# Patient Record
Sex: FEMALE | Race: WHITE | Employment: OTHER | URBAN - NONMETROPOLITAN AREA
[De-identification: names, ages, dates, MRNs, and addresses within clinical notes are randomized per-mention and may not be internally consistent; named-entity substitution may affect disease eponyms.]

---

## 2017-07-20 ENCOUNTER — HOSPITAL ENCOUNTER (OUTPATIENT)
Age: 82
Setting detail: SPECIMEN
Discharge: HOME | End: 2017-07-20
Payer: MEDICAID

## 2017-07-24 ENCOUNTER — HOSPITAL ENCOUNTER (OUTPATIENT)
Age: 82
Setting detail: SPECIMEN
Discharge: HOME | End: 2017-07-24
Payer: MEDICAID

## 2017-08-11 ENCOUNTER — HOSPITAL ENCOUNTER (OUTPATIENT)
Age: 82
Setting detail: SPECIMEN
Discharge: HOME | End: 2017-08-11
Payer: MEDICAID

## 2017-08-14 ENCOUNTER — HOSPITAL ENCOUNTER (OUTPATIENT)
Age: 82
Setting detail: SPECIMEN
Discharge: HOME | End: 2017-08-14
Payer: MEDICAID

## 2017-08-16 ENCOUNTER — HOSPITAL ENCOUNTER (OUTPATIENT)
Age: 82
Setting detail: SPECIMEN
Discharge: HOME | End: 2017-08-16
Payer: MEDICARE

## 2017-08-16 LAB
ABSOLUTE EOS #: 0 K/UL (ref 0–0.4)
ABSOLUTE LYMPH #: 1.3 K/UL (ref 1–4.8)
ABSOLUTE MONO #: 0.5 K/UL (ref 0–1)
BASOPHILS # BLD: 0 %
BASOPHILS ABSOLUTE: 0 K/UL (ref 0–0.2)
DIFFERENTIAL TYPE: ABNORMAL
EOSINOPHILS RELATIVE PERCENT: 1 %
HCT VFR BLD CALC: 35.8 % (ref 36–46)
HEMOGLOBIN: 12.1 G/DL (ref 12–16)
LYMPHOCYTES # BLD: 21 %
MCH RBC QN AUTO: 30.4 PG (ref 26–34)
MCHC RBC AUTO-ENTMCNC: 33.7 G/DL (ref 31–37)
MCV RBC AUTO: 90.1 FL (ref 80–100)
MONOCYTES # BLD: 8 %
PDW BLD-RTO: 14.5 % (ref 12.1–15.2)
PLATELET # BLD: 227 K/UL (ref 140–450)
PLATELET ESTIMATE: ABNORMAL
PMV BLD AUTO: 10.4 FL (ref 6–12)
RBC # BLD: 3.97 M/UL (ref 4–5.2)
RBC # BLD: ABNORMAL 10*6/UL
SEG NEUTROPHILS: 70 %
SEGMENTED NEUTROPHILS ABSOLUTE COUNT: 4.1 K/UL (ref 1.8–7.7)
WBC # BLD: 5.9 K/UL (ref 3.5–11)
WBC # BLD: ABNORMAL 10*3/UL

## 2017-08-16 PROCEDURE — 85025 COMPLETE CBC W/AUTO DIFF WBC: CPT

## 2017-08-16 PROCEDURE — 36415 COLL VENOUS BLD VENIPUNCTURE: CPT

## 2017-08-16 PROCEDURE — P9604 ONE-WAY ALLOW PRORATED TRIP: HCPCS

## 2018-01-24 ENCOUNTER — HOSPITAL ENCOUNTER (OUTPATIENT)
Age: 83
Setting detail: SPECIMEN
Discharge: HOME | End: 2018-01-24
Payer: MEDICARE

## 2018-01-24 LAB
ABSOLUTE BANDS #: 0.23 K/UL (ref 0–1)
ABSOLUTE EOS #: 0.23 K/UL (ref 0–0.4)
ABSOLUTE IMMATURE GRANULOCYTE: ABNORMAL K/UL (ref 0–0.3)
ABSOLUTE LYMPH #: 0.98 K/UL (ref 1–4.8)
ABSOLUTE MONO #: 0.62 K/UL (ref 0–1)
ATYPICAL LYMPHOCYTE ABSOLUTE COUNT: 0.08 K/UL
ATYPICAL LYMPHOCYTES: 2 %
BANDS: 6 % (ref 0–10)
BASOPHILS # BLD: 1 % (ref 0–2)
BASOPHILS ABSOLUTE: 0.04 K/UL (ref 0–0.2)
DIFFERENTIAL TYPE: ABNORMAL
EOSINOPHILS RELATIVE PERCENT: 6 % (ref 0–8)
HCT VFR BLD CALC: 35.7 % (ref 36–46)
HEMOGLOBIN: 11.9 G/DL (ref 12–16)
IMMATURE GRANULOCYTES: ABNORMAL %
LYMPHOCYTES # BLD: 25 % (ref 24–44)
MCH RBC QN AUTO: 30.3 PG (ref 26–34)
MCHC RBC AUTO-ENTMCNC: 33.3 G/DL (ref 31–37)
MCV RBC AUTO: 91.2 FL (ref 80–100)
MONOCYTES # BLD: 16 % (ref 0–12)
MORPHOLOGY: NORMAL
NRBC AUTOMATED: ABNORMAL PER 100 WBC
PDW BLD-RTO: 14.3 % (ref 12.1–15.2)
PLATELET # BLD: 209 K/UL (ref 140–450)
PLATELET ESTIMATE: ABNORMAL
PMV BLD AUTO: 10.4 FL (ref 6–12)
RBC # BLD: 3.91 M/UL (ref 4–5.2)
RBC # BLD: ABNORMAL 10*6/UL
SEG NEUTROPHILS: 44 % (ref 36–66)
SEGMENTED NEUTROPHILS ABSOLUTE COUNT: 1.72 K/UL (ref 1.8–7.7)
WBC # BLD: 3.9 K/UL (ref 3.5–11)
WBC # BLD: ABNORMAL 10*3/UL

## 2018-01-24 PROCEDURE — 85025 COMPLETE CBC W/AUTO DIFF WBC: CPT

## 2018-01-24 PROCEDURE — 36415 COLL VENOUS BLD VENIPUNCTURE: CPT

## 2018-01-24 PROCEDURE — P9604 ONE-WAY ALLOW PRORATED TRIP: HCPCS

## 2018-02-28 ENCOUNTER — OUTSIDE SERVICES (OUTPATIENT)
Dept: FAMILY MEDICINE CLINIC | Age: 83
End: 2018-02-28
Payer: MEDICARE

## 2018-02-28 DIAGNOSIS — I10 ESSENTIAL HYPERTENSION: Primary | ICD-10-CM

## 2018-02-28 DIAGNOSIS — F03.90 DEMENTIA WITHOUT BEHAVIORAL DISTURBANCE, UNSPECIFIED DEMENTIA TYPE: ICD-10-CM

## 2018-02-28 DIAGNOSIS — F42.9 OBSESSIVE-COMPULSIVE DISORDER, UNSPECIFIED TYPE: ICD-10-CM

## 2018-02-28 DIAGNOSIS — D50.9 IRON DEFICIENCY ANEMIA, UNSPECIFIED IRON DEFICIENCY ANEMIA TYPE: ICD-10-CM

## 2018-02-28 DIAGNOSIS — N18.30 CHRONIC RENAL FAILURE, STAGE 3 (MODERATE) (HCC): ICD-10-CM

## 2018-02-28 PROCEDURE — 99308 SBSQ NF CARE LOW MDM 20: CPT | Performed by: FAMILY MEDICINE

## 2018-02-28 NOTE — PROGRESS NOTES
The following note was scribed by ANNABELLE Cox, [address] [facility]   [facility]  [address] [facility]  [place] [id number]  Dept: [phone]    HPI:  Karon Montes is a 80 y.o. female being seen for her 1 month follow up. Location of visit: [facility]    Last Visit:  She continues to express wanting to go home, I explain to her again that this is not my decision. I changed Omeprazole to qod for 2 weeks and then stop and replacing this with Pepcid. Long term use of this medication may affect the kidneys. Faxed Correspondence:  Nurse reported on 1/23 that she is requesting cough medicine d/t dry unproductive cough, no other symptoms. Delsym 1 teaspoon q12h prn was started. Nurse clarifying Delsym order is for Delsym ER 30 mg/5ml on 1/24. On 2/1 her temp was 99.8, moist productive cough, rhonchi noted bilat upper lungs, albuterol ud tid x 3 days, bid 3 days was initiated. 2/2: She is adamantly refused aerosol treatments, stating she does not need it. She is having purulent sputum, no fever. Start Levaquin 500 mg po qd 7 days and d/c aerosols. 2/15: She was complaining of R rib pain with discomfort on R side of back. Tylenol somewhat helpful. Chest x-ray reviewed. 2/19: Chest x-ray results received bony ossification of the R ribs is normal. No fracture or costovertebral dislocation, no pneumothorax is seen. 2/19: Returning from [place] and while walking back in the door, the door hit her on the R eyebrow and the side of the eye, there is a small abrasion to the R eyebrow and redness noted to the side of the face. New Today:  Nurse reports that she has been losing weight and picking at food, she does not believe she will drink a boost. She also has indigestion and belching with change of medication. Prior to Admission medications    Medication Sig Start Date End Date Taking?  Authorizing Provider   acetaminophen 650 MG TABS Take 650 mg by mouth every 4 hours as needed. 12/23/14   Caity Bloom MD   amLODIPine (NORVASC) 2.5 MG tablet Take 1 tablet by mouth daily. 12/23/14   Caity Bloom MD   ferrous sulfate 325 (65 FE) MG tablet Take 1 tablet by mouth 2 times daily (with meals). 12/23/14   Caity Bloom MD   bimatoprost (LUMIGAN) 0.01 % SOLN ophthalmic drops Place 1 drop into the right eye nightly. 12/23/14   Caity Bloom MD   NONFORMULARY Eye drops    Historical Provider, MD       The patients medications and allergies were reviewed in [facility]    The patients Medical, Family and Surgical history were reviewed in Epic as well    ROS:  Denies chest pain or palpitation  Denies coughing and SOB   Denies rash  Denies muscle or joint pains  Denies lightheadedness or dizziness  Denies abdominal pain, constipation, diarrhea, and urinary abnormalities  Denies falls    Past Surgical History:   Procedure Laterality Date    CATARACT REMOVAL       No family history on file. No past medical history on file. Social History   Substance Use Topics    Smoking status: Never Smoker    Smokeless tobacco: Not on file    Alcohol use Not on file      Current Outpatient Prescriptions   Medication Sig Dispense Refill    acetaminophen 650 MG TABS Take 650 mg by mouth every 4 hours as needed. 120 tablet 3    amLODIPine (NORVASC) 2.5 MG tablet Take 1 tablet by mouth daily. 30 tablet 3    ferrous sulfate 325 (65 FE) MG tablet Take 1 tablet by mouth 2 times daily (with meals). 30 tablet 3    bimatoprost (LUMIGAN) 0.01 % SOLN ophthalmic drops Place 1 drop into the right eye nightly.  NONFORMULARY Eye drops       No current facility-administered medications for this visit. No Known Allergies    PHYSICAL EXAM:    There were no vitals taken for this visit.   Wt Readings from Last 3 Encounters:   12/22/14 97 lb (44 kg)   11/24/14 100 lb (45.4 kg)     BP Readings from Last 3 Encounters:   12/23/14 151/71   11/24/14 198/84     General: alert, oriented, responsive, in no

## 2018-03-07 PROBLEM — R63.0 ANOREXIA: Status: ACTIVE | Noted: 2018-03-07

## 2018-03-07 PROBLEM — G31.9 DEGENERATIVE DISEASE OF NERVOUS SYSTEM (HCC): Status: ACTIVE | Noted: 2018-03-07

## 2018-03-07 PROBLEM — I51.7 CARDIOMEGALY: Status: ACTIVE | Noted: 2018-03-07

## 2018-03-07 PROBLEM — H50.10 EXOTROPIA: Status: ACTIVE | Noted: 2018-03-07

## 2018-03-07 PROBLEM — H54.8 LEGAL BLINDNESS, AS DEFINED IN USA: Status: ACTIVE | Noted: 2018-03-07

## 2018-03-07 PROBLEM — G93.40 ENCEPHALOPATHY: Status: ACTIVE | Noted: 2018-03-07

## 2018-03-07 PROBLEM — F29 PSYCHOSIS NOT DUE TO SUBSTANCE OR KNOWN PHYSIOLOGICAL CONDITION (HCC): Status: ACTIVE | Noted: 2018-03-07

## 2018-03-07 PROBLEM — N18.9 CHRONIC KIDNEY DISEASE (CKD): Status: ACTIVE | Noted: 2018-03-07

## 2018-03-07 PROBLEM — R41.9 UNSPECIFIED SYMPTOMS AND SIGNS INVOLVING COGNITIVE FUNCTIONS AND AWARENESS: Status: ACTIVE | Noted: 2018-03-07

## 2018-03-07 PROBLEM — F42.9 OBSESSIVE COMPULSIVE DISORDER: Status: ACTIVE | Noted: 2018-03-07

## 2018-03-07 PROBLEM — F22 DELUSIONAL DISORDER, MIXED TYPE (HCC): Status: ACTIVE | Noted: 2018-03-07

## 2018-03-07 PROBLEM — F03.90 UNSPECIFIED DEMENTIA WITHOUT BEHAVIORAL DISTURBANCE: Status: ACTIVE | Noted: 2018-03-07

## 2018-03-28 ENCOUNTER — OUTSIDE SERVICES (OUTPATIENT)
Dept: FAMILY MEDICINE CLINIC | Age: 83
End: 2018-03-28
Payer: MEDICARE

## 2018-03-28 DIAGNOSIS — N18.9 CHRONIC KIDNEY DISEASE, UNSPECIFIED CKD STAGE: ICD-10-CM

## 2018-03-28 DIAGNOSIS — F29 PSYCHOSIS NOT DUE TO SUBSTANCE OR KNOWN PHYSIOLOGICAL CONDITION (HCC): ICD-10-CM

## 2018-03-28 DIAGNOSIS — F42.9 OBSESSIVE-COMPULSIVE DISORDER, UNSPECIFIED TYPE: ICD-10-CM

## 2018-03-28 DIAGNOSIS — H54.8 LEGAL BLINDNESS, AS DEFINED IN USA: ICD-10-CM

## 2018-03-28 DIAGNOSIS — F22 DELUSIONAL DISORDER, MIXED TYPE (HCC): ICD-10-CM

## 2018-03-28 DIAGNOSIS — G31.9 DEGENERATIVE DISEASE OF NERVOUS SYSTEM (HCC): ICD-10-CM

## 2018-03-28 DIAGNOSIS — F03.90 DEMENTIA WITHOUT BEHAVIORAL DISTURBANCE, UNSPECIFIED DEMENTIA TYPE: ICD-10-CM

## 2018-03-28 DIAGNOSIS — R41.9 UNSPECIFIED SYMPTOMS AND SIGNS INVOLVING COGNITIVE FUNCTIONS AND AWARENESS: ICD-10-CM

## 2018-03-28 DIAGNOSIS — I10 ESSENTIAL HYPERTENSION: Primary | Chronic | ICD-10-CM

## 2018-03-28 PROCEDURE — G8484 FLU IMMUNIZE NO ADMIN: HCPCS | Performed by: FAMILY MEDICINE

## 2018-03-28 PROCEDURE — 1123F ACP DISCUSS/DSCN MKR DOCD: CPT | Performed by: FAMILY MEDICINE

## 2018-03-28 PROCEDURE — 99308 SBSQ NF CARE LOW MDM 20: CPT | Performed by: FAMILY MEDICINE

## 2018-04-22 PROBLEM — N18.30 STAGE 3 CHRONIC KIDNEY DISEASE (HCC): Status: ACTIVE | Noted: 2018-03-07

## 2018-06-21 PROBLEM — R63.0 ANOREXIA: Status: RESOLVED | Noted: 2018-03-07 | Resolved: 2018-06-21

## 2018-06-21 PROBLEM — F29 PSYCHOSIS NOT DUE TO SUBSTANCE OR KNOWN PHYSIOLOGICAL CONDITION (HCC): Status: RESOLVED | Noted: 2018-03-07 | Resolved: 2018-06-21

## 2018-06-21 PROBLEM — G31.9 DEGENERATIVE DISEASE OF NERVOUS SYSTEM (HCC): Status: RESOLVED | Noted: 2018-03-07 | Resolved: 2018-06-21

## 2018-06-21 PROBLEM — R41.9 UNSPECIFIED SYMPTOMS AND SIGNS INVOLVING COGNITIVE FUNCTIONS AND AWARENESS: Status: RESOLVED | Noted: 2018-03-07 | Resolved: 2018-06-21

## 2018-06-21 PROBLEM — G93.40 ENCEPHALOPATHY: Status: RESOLVED | Noted: 2018-03-07 | Resolved: 2018-06-21

## 2018-07-23 ENCOUNTER — HOSPITAL ENCOUNTER (OUTPATIENT)
Age: 83
Setting detail: SPECIMEN
Discharge: HOME | End: 2018-07-23
Payer: MEDICARE

## 2018-07-23 LAB
ABSOLUTE EOS #: <0.03 K/UL (ref 0–0.44)
ABSOLUTE IMMATURE GRANULOCYTE: 0.03 K/UL (ref 0–0.3)
ABSOLUTE LYMPH #: 1.2 K/UL (ref 1.1–3.7)
ABSOLUTE MONO #: 0.47 K/UL (ref 0.1–1.2)
BASOPHILS # BLD: 1 % (ref 0–2)
BASOPHILS ABSOLUTE: 0.05 K/UL (ref 0–0.2)
DIFFERENTIAL TYPE: ABNORMAL
EOSINOPHILS RELATIVE PERCENT: 0 % (ref 1–4)
HCT VFR BLD CALC: 37.2 % (ref 36.3–47.1)
HEMOGLOBIN: 11.9 G/DL (ref 11.9–15.1)
IMMATURE GRANULOCYTES: 0 %
LYMPHOCYTES # BLD: 18 % (ref 24–43)
MCH RBC QN AUTO: 30.2 PG (ref 25.2–33.5)
MCHC RBC AUTO-ENTMCNC: 32 G/DL (ref 28.4–34.8)
MCV RBC AUTO: 94.4 FL (ref 82.6–102.9)
MONOCYTES # BLD: 7 % (ref 3–12)
NRBC AUTOMATED: 0 PER 100 WBC
PDW BLD-RTO: 13 % (ref 11.8–14.4)
PLATELET # BLD: 302 K/UL (ref 138–453)
PLATELET ESTIMATE: ABNORMAL
PMV BLD AUTO: 11 FL (ref 8.1–13.5)
RBC # BLD: 3.94 M/UL (ref 3.95–5.11)
RBC # BLD: ABNORMAL 10*6/UL
SEG NEUTROPHILS: 74 % (ref 36–65)
SEGMENTED NEUTROPHILS ABSOLUTE COUNT: 5.12 K/UL (ref 1.5–8.1)
WBC # BLD: 6.9 K/UL (ref 3.5–11.3)
WBC # BLD: ABNORMAL 10*3/UL

## 2018-07-23 PROCEDURE — P9604 ONE-WAY ALLOW PRORATED TRIP: HCPCS

## 2018-07-23 PROCEDURE — 36415 COLL VENOUS BLD VENIPUNCTURE: CPT

## 2018-07-23 PROCEDURE — 85025 COMPLETE CBC W/AUTO DIFF WBC: CPT

## 2019-01-16 PROBLEM — F32.A DEPRESSION: Status: ACTIVE | Noted: 2019-01-16

## 2019-01-24 ENCOUNTER — HOSPITAL ENCOUNTER (OUTPATIENT)
Age: 84
Setting detail: SPECIMEN
Discharge: HOME | End: 2019-01-24
Payer: MEDICARE

## 2019-01-24 LAB
ABSOLUTE EOS #: <0.03 K/UL (ref 0–0.44)
ABSOLUTE IMMATURE GRANULOCYTE: <0.03 K/UL (ref 0–0.3)
ABSOLUTE LYMPH #: 1.11 K/UL (ref 1.1–3.7)
ABSOLUTE MONO #: 0.47 K/UL (ref 0.1–1.2)
BASOPHILS # BLD: 1 % (ref 0–2)
BASOPHILS ABSOLUTE: 0.04 K/UL (ref 0–0.2)
DIFFERENTIAL TYPE: ABNORMAL
EOSINOPHILS RELATIVE PERCENT: 0 % (ref 1–4)
HCT VFR BLD CALC: 35.5 % (ref 36.3–47.1)
HEMOGLOBIN: 11.3 G/DL (ref 11.9–15.1)
IMMATURE GRANULOCYTES: 0 %
LYMPHOCYTES # BLD: 19 % (ref 24–43)
MCH RBC QN AUTO: 30.1 PG (ref 25.2–33.5)
MCHC RBC AUTO-ENTMCNC: 31.8 G/DL (ref 28.4–34.8)
MCV RBC AUTO: 94.7 FL (ref 82.6–102.9)
MONOCYTES # BLD: 8 % (ref 3–12)
NRBC AUTOMATED: 0 PER 100 WBC
PDW BLD-RTO: 13.2 % (ref 11.8–14.4)
PLATELET # BLD: 288 K/UL (ref 138–453)
PLATELET ESTIMATE: ABNORMAL
PMV BLD AUTO: 11.5 FL (ref 8.1–13.5)
RBC # BLD: 3.75 M/UL (ref 3.95–5.11)
RBC # BLD: ABNORMAL 10*6/UL
SEG NEUTROPHILS: 72 % (ref 36–65)
SEGMENTED NEUTROPHILS ABSOLUTE COUNT: 4.11 K/UL (ref 1.5–8.1)
WBC # BLD: 5.7 K/UL (ref 3.5–11.3)
WBC # BLD: ABNORMAL 10*3/UL

## 2019-01-24 PROCEDURE — 85025 COMPLETE CBC W/AUTO DIFF WBC: CPT

## 2019-01-24 PROCEDURE — 36415 COLL VENOUS BLD VENIPUNCTURE: CPT

## 2019-07-17 ENCOUNTER — HOSPITAL ENCOUNTER (OUTPATIENT)
Age: 84
Setting detail: SPECIMEN
Discharge: HOME | End: 2019-07-17
Payer: MEDICARE

## 2019-07-17 LAB
ABSOLUTE EOS #: <0.03 K/UL (ref 0–0.44)
ABSOLUTE IMMATURE GRANULOCYTE: <0.03 K/UL (ref 0–0.3)
ABSOLUTE LYMPH #: 0.89 K/UL (ref 1.1–3.7)
ABSOLUTE MONO #: 0.43 K/UL (ref 0.1–1.2)
BASOPHILS # BLD: 1 % (ref 0–2)
BASOPHILS ABSOLUTE: 0.05 K/UL (ref 0–0.2)
DIFFERENTIAL TYPE: ABNORMAL
EOSINOPHILS RELATIVE PERCENT: 0 % (ref 1–4)
HCT VFR BLD CALC: 37.9 % (ref 36.3–47.1)
HEMOGLOBIN: 11.6 G/DL (ref 11.9–15.1)
IMMATURE GRANULOCYTES: 0 %
LYMPHOCYTES # BLD: 18 % (ref 24–43)
MCH RBC QN AUTO: 29.1 PG (ref 25.2–33.5)
MCHC RBC AUTO-ENTMCNC: 30.6 G/DL (ref 28.4–34.8)
MCV RBC AUTO: 95.2 FL (ref 82.6–102.9)
MONOCYTES # BLD: 8 % (ref 3–12)
NRBC AUTOMATED: 0 PER 100 WBC
PDW BLD-RTO: 13.4 % (ref 11.8–14.4)
PLATELET # BLD: 253 K/UL (ref 138–453)
PLATELET ESTIMATE: ABNORMAL
PMV BLD AUTO: 11.9 FL (ref 8.1–13.5)
RBC # BLD: 3.98 M/UL (ref 3.95–5.11)
RBC # BLD: ABNORMAL 10*6/UL
SEG NEUTROPHILS: 73 % (ref 36–65)
SEGMENTED NEUTROPHILS ABSOLUTE COUNT: 3.71 K/UL (ref 1.5–8.1)
WBC # BLD: 5.1 K/UL (ref 3.5–11.3)
WBC # BLD: ABNORMAL 10*3/UL

## 2019-07-17 PROCEDURE — 85025 COMPLETE CBC W/AUTO DIFF WBC: CPT

## 2019-07-17 PROCEDURE — P9604 ONE-WAY ALLOW PRORATED TRIP: HCPCS

## 2019-07-17 PROCEDURE — 36415 COLL VENOUS BLD VENIPUNCTURE: CPT

## 2019-07-29 ENCOUNTER — HOSPITAL ENCOUNTER (OUTPATIENT)
Age: 84
Setting detail: SPECIMEN
Discharge: HOME | End: 2019-07-29
Payer: MEDICARE

## 2019-07-29 LAB
ABSOLUTE EOS #: <0.03 K/UL (ref 0–0.44)
ABSOLUTE IMMATURE GRANULOCYTE: <0.03 K/UL (ref 0–0.3)
ABSOLUTE LYMPH #: 1.6 K/UL (ref 1.1–3.7)
ABSOLUTE MONO #: 0.63 K/UL (ref 0.1–1.2)
ALBUMIN SERPL-MCNC: 3.4 G/DL (ref 3.5–5.2)
ALBUMIN/GLOBULIN RATIO: 0.9 (ref 1–2.5)
ALP BLD-CCNC: 85 U/L (ref 35–104)
ALT SERPL-CCNC: 9 U/L (ref 5–33)
ANION GAP SERPL CALCULATED.3IONS-SCNC: 12 MMOL/L (ref 9–17)
AST SERPL-CCNC: 19 U/L
BASOPHILS # BLD: 1 % (ref 0–2)
BASOPHILS ABSOLUTE: 0.05 K/UL (ref 0–0.2)
BILIRUB SERPL-MCNC: 0.27 MG/DL (ref 0.3–1.2)
BUN BLDV-MCNC: 29 MG/DL (ref 8–23)
BUN/CREAT BLD: 18 (ref 9–20)
CALCIUM SERPL-MCNC: 9.7 MG/DL (ref 8.6–10.4)
CHLORIDE BLD-SCNC: 102 MMOL/L (ref 98–107)
CO2: 27 MMOL/L (ref 20–31)
CREAT SERPL-MCNC: 1.64 MG/DL (ref 0.5–0.9)
DIFFERENTIAL TYPE: ABNORMAL
EOSINOPHILS RELATIVE PERCENT: 0 % (ref 1–4)
GFR AFRICAN AMERICAN: 36 ML/MIN
GFR NON-AFRICAN AMERICAN: 30 ML/MIN
GFR SERPL CREATININE-BSD FRML MDRD: ABNORMAL ML/MIN/{1.73_M2}
GFR SERPL CREATININE-BSD FRML MDRD: ABNORMAL ML/MIN/{1.73_M2}
GLUCOSE BLD-MCNC: 96 MG/DL (ref 70–99)
HCT VFR BLD CALC: 37.9 % (ref 36.3–47.1)
HEMOGLOBIN: 11.8 G/DL (ref 11.9–15.1)
IMMATURE GRANULOCYTES: 0 %
LYMPHOCYTES # BLD: 23 % (ref 24–43)
MCH RBC QN AUTO: 28.8 PG (ref 25.2–33.5)
MCHC RBC AUTO-ENTMCNC: 31.1 G/DL (ref 28.4–34.8)
MCV RBC AUTO: 92.4 FL (ref 82.6–102.9)
MONOCYTES # BLD: 9 % (ref 3–12)
NRBC AUTOMATED: 0 PER 100 WBC
PDW BLD-RTO: 13.2 % (ref 11.8–14.4)
PLATELET # BLD: 324 K/UL (ref 138–453)
PLATELET ESTIMATE: ABNORMAL
PMV BLD AUTO: 12.6 FL (ref 8.1–13.5)
POTASSIUM SERPL-SCNC: 4.3 MMOL/L (ref 3.7–5.3)
RBC # BLD: 4.1 M/UL (ref 3.95–5.11)
RBC # BLD: ABNORMAL 10*6/UL
SEG NEUTROPHILS: 67 % (ref 36–65)
SEGMENTED NEUTROPHILS ABSOLUTE COUNT: 4.65 K/UL (ref 1.5–8.1)
SODIUM BLD-SCNC: 141 MMOL/L (ref 135–144)
TOTAL PROTEIN: 7 G/DL (ref 6.4–8.3)
WBC # BLD: 7 K/UL (ref 3.5–11.3)
WBC # BLD: ABNORMAL 10*3/UL

## 2019-07-29 PROCEDURE — 85025 COMPLETE CBC W/AUTO DIFF WBC: CPT

## 2019-07-29 PROCEDURE — P9603 ONE-WAY ALLOW PRORATED MILES: HCPCS

## 2019-07-29 PROCEDURE — 36415 COLL VENOUS BLD VENIPUNCTURE: CPT

## 2019-07-29 PROCEDURE — 80053 COMPREHEN METABOLIC PANEL: CPT

## 2020-01-16 ENCOUNTER — HOSPITAL ENCOUNTER (OUTPATIENT)
Age: 85
Setting detail: SPECIMEN
Discharge: HOME | End: 2020-01-16
Payer: COMMERCIAL

## 2020-01-16 LAB
ABSOLUTE EOS #: <0.03 K/UL (ref 0–0.44)
ABSOLUTE IMMATURE GRANULOCYTE: <0.03 K/UL (ref 0–0.3)
ABSOLUTE LYMPH #: 1.01 K/UL (ref 1.1–3.7)
ABSOLUTE MONO #: 0.43 K/UL (ref 0.1–1.2)
BASOPHILS # BLD: 1 % (ref 0–2)
BASOPHILS ABSOLUTE: 0.06 K/UL (ref 0–0.2)
DIFFERENTIAL TYPE: ABNORMAL
EOSINOPHILS RELATIVE PERCENT: 0 % (ref 1–4)
HCT VFR BLD CALC: 29.9 % (ref 36.3–47.1)
HEMOGLOBIN: 9.5 G/DL (ref 11.9–15.1)
IMMATURE GRANULOCYTES: 0 %
LYMPHOCYTES # BLD: 14 % (ref 24–43)
MCH RBC QN AUTO: 31 PG (ref 25.2–33.5)
MCHC RBC AUTO-ENTMCNC: 31.8 G/DL (ref 28.4–34.8)
MCV RBC AUTO: 97.7 FL (ref 82.6–102.9)
MONOCYTES # BLD: 6 % (ref 3–12)
NRBC AUTOMATED: 0 PER 100 WBC
PDW BLD-RTO: 13.7 % (ref 11.8–14.4)
PLATELET # BLD: 307 K/UL (ref 138–453)
PLATELET ESTIMATE: ABNORMAL
PMV BLD AUTO: 11 FL (ref 8.1–13.5)
RBC # BLD: 3.06 M/UL (ref 3.95–5.11)
RBC # BLD: ABNORMAL 10*6/UL
SEG NEUTROPHILS: 79 % (ref 36–65)
SEGMENTED NEUTROPHILS ABSOLUTE COUNT: 5.73 K/UL (ref 1.5–8.1)
WBC # BLD: 7.2 K/UL (ref 3.5–11.3)
WBC # BLD: ABNORMAL 10*3/UL

## 2020-01-16 PROCEDURE — P9603 ONE-WAY ALLOW PRORATED MILES: HCPCS

## 2020-01-16 PROCEDURE — 85025 COMPLETE CBC W/AUTO DIFF WBC: CPT

## 2020-01-16 PROCEDURE — 36415 COLL VENOUS BLD VENIPUNCTURE: CPT

## 2020-01-27 PROBLEM — F41.9 ANXIETY DISORDER: Status: ACTIVE | Noted: 2020-01-27

## 2020-02-13 ENCOUNTER — HOSPITAL ENCOUNTER (OUTPATIENT)
Age: 85
Setting detail: SPECIMEN
Discharge: HOME | End: 2020-02-13
Payer: MEDICARE

## 2020-02-13 LAB
ABSOLUTE EOS #: <0.03 K/UL (ref 0–0.44)
ABSOLUTE IMMATURE GRANULOCYTE: <0.03 K/UL (ref 0–0.3)
ABSOLUTE LYMPH #: 1.13 K/UL (ref 1.1–3.7)
ABSOLUTE MONO #: 0.38 K/UL (ref 0.1–1.2)
BASOPHILS # BLD: 1 % (ref 0–2)
BASOPHILS ABSOLUTE: 0.04 K/UL (ref 0–0.2)
DIFFERENTIAL TYPE: ABNORMAL
EOSINOPHILS RELATIVE PERCENT: 0 % (ref 1–4)
HCT VFR BLD CALC: 33 % (ref 36.3–47.1)
HEMOGLOBIN: 10.3 G/DL (ref 11.9–15.1)
IMMATURE GRANULOCYTES: 0 %
LYMPHOCYTES # BLD: 23 % (ref 24–43)
MCH RBC QN AUTO: 30.7 PG (ref 25.2–33.5)
MCHC RBC AUTO-ENTMCNC: 31.2 G/DL (ref 28.4–34.8)
MCV RBC AUTO: 98.2 FL (ref 82.6–102.9)
MONOCYTES # BLD: 8 % (ref 3–12)
NRBC AUTOMATED: 0 PER 100 WBC
PDW BLD-RTO: 12.8 % (ref 11.8–14.4)
PLATELET # BLD: 275 K/UL (ref 138–453)
PLATELET ESTIMATE: ABNORMAL
PMV BLD AUTO: 11.5 FL (ref 8.1–13.5)
RBC # BLD: 3.36 M/UL (ref 3.95–5.11)
RBC # BLD: ABNORMAL 10*6/UL
SEG NEUTROPHILS: 68 % (ref 36–65)
SEGMENTED NEUTROPHILS ABSOLUTE COUNT: 3.33 K/UL (ref 1.5–8.1)
WBC # BLD: 4.9 K/UL (ref 3.5–11.3)
WBC # BLD: ABNORMAL 10*3/UL

## 2020-02-13 PROCEDURE — 85025 COMPLETE CBC W/AUTO DIFF WBC: CPT

## 2020-02-13 PROCEDURE — 36415 COLL VENOUS BLD VENIPUNCTURE: CPT

## 2020-02-13 PROCEDURE — P9604 ONE-WAY ALLOW PRORATED TRIP: HCPCS

## 2021-04-07 ENCOUNTER — OUTSIDE SERVICES (OUTPATIENT)
Dept: FAMILY MEDICINE CLINIC | Age: 86
End: 2021-04-07
Payer: MEDICARE

## 2021-04-07 DIAGNOSIS — F22 DELUSIONAL DISORDER, MIXED TYPE (HCC): ICD-10-CM

## 2021-04-07 DIAGNOSIS — I10 ESSENTIAL HYPERTENSION: Primary | ICD-10-CM

## 2021-04-07 DIAGNOSIS — N18.30 STAGE 3 CHRONIC KIDNEY DISEASE, UNSPECIFIED WHETHER STAGE 3A OR 3B CKD (HCC): ICD-10-CM

## 2021-04-07 DIAGNOSIS — D50.9 IRON DEFICIENCY ANEMIA, UNSPECIFIED IRON DEFICIENCY ANEMIA TYPE: ICD-10-CM

## 2021-04-07 PROCEDURE — 99308 SBSQ NF CARE LOW MDM 20: CPT | Performed by: FAMILY MEDICINE

## 2021-04-07 NOTE — PROGRESS NOTES
Patient:  Jackie Rodriguez, 1935  I saw this patient on 4/7/2021, at [address] [facility] care  Denies any pain  She feels well and weight has been stable  BP fluctuates      Reason for Visit:      ICD-10-CM    1. Essential hypertension  I10    2. Iron deficiency anemia, unspecified iron deficiency anemia type  D50.9    3. [facility] ([facility]  F22    4. Stage 3 chronic kidney disease, unspecified whether stage 3a or 3b CKD  N18.30        Changes since last visit:   Mood stable  Denies pain  1. Fall:  none  2. Behavioral Change: mood stable  3. Pain Control: no issues  4. Mobility: no change  5. Pressure Sore:  none  Current medications    Medication Sig Start Date End Date Taking?  Authorizing Provider   amitriptyline (ELAVIL) 50 MG tablet Take 50 mg by mouth nightly    Historical Provider, MD   aluminum & magnesium hydroxide-simethicone (MAALOX) 200-200-20 MG/5ML SUSP suspension Take 30 mLs by mouth every 6 hours as needed for Indigestion    Historical Provider, MD   brimonidine (ALPHAGAN P) 0.15 % ophthalmic solution Place 1 drop into the right eye 2 times daily    Historical Provider, MD   bisacodyl (DULCOLAX) 10 MG suppository Place 10 mg rectally daily as needed for Constipation    Historical Provider, MD   dextromethorphan (DELSYM) 30 MG/5ML extended release liquid Take 30 mg by mouth 2 times daily as needed for Cough    Historical Provider, MD   ferrous sulfate 325 (65 Fe) MG tablet Take 325 mg by mouth daily (with breakfast)    Historical Provider, MD   Sodium Phosphates (FLEET) 7-19 GM/118ML Place 1 enema rectally once as needed    Historical Provider, MD   latanoprost (XALATAN) 0.005 % ophthalmic solution Place 1 drop into the right eye nightly    Historical Provider, MD   magnesium hydroxide (MILK OF MAGNESIA CONCENTRATE) 2400 MG/10ML SUSP Take 2,400 mg by mouth daily as needed    Historical Provider, MD   polyethylene glycol (GLYCOLAX) powder Take 17 g by mouth daily Historical Provider, MD   amLODIPine (NORVASC) 5 MG tablet Take 5 mg by mouth daily    Historical Provider, MD   famotidine (PEPCID) 20 MG tablet Take 20 mg by mouth daily    Historical Provider, MD   vitamin D (ERGOCALCIFEROL) 81685 units CAPS capsule Take 50,000 Units by mouth every 14 days     Historical Provider, MD   acetaminophen 650 MG TABS Take 650 mg by mouth every 4 hours as needed. 12/23/14   Erik Amos MD     Allergies:  Patient has no known allergies. Past Medical History:    Past Medical History:   Diagnosis Date    Anemia     Anorexia     Anxiety disorder 1/27/2020    Blind     Cardiomegaly     Chronic kidney disease     Dementia ([facility]     Depression     Hypertension     OCD (obsessive compulsive disorder)        Past Surgical History:    Past Surgical History:   Procedure Laterality Date    CATARACT REMOVAL         Social History:   Social History     Tobacco Use    Smoking status: Never Smoker    Smokeless tobacco: Never Used   Substance Use Topics    Alcohol use: Not on file       Family History:   No family history on file. Review of Systems:  Constitutional: negative for fevers or chills  Eyes: blind both eyes  ENT: negative for sore throat or nasal congestion,no dysphagia  Respiratory: neg for shortness of breath , cough  Cardiovascular: neg for chest pain , palpitations,pnd,negative for leg edema  Gastrointestinal: neg for for abd pain, nausea, vomiting, diarrhea or constipation,no gene,no blood in stool,  Appetite better  Genitourinary: negative for dysuria, urgency,hematuria or frequency  Integument/breast: negative for skin rash or lesions  Neurological: negative for unilateral weakness, numbness or tingling.   Muscular Skeletal: no joint pain   Psych :mood stable, keeps talking about going home    Objective:    Vitals: BP: 167/74 T:97.5 P:82 R:16  -----------------------------------------------------------------  Exam:  GEN:   Awake, not in any distress, poorly nurished  EYES:  Blind both eyes  ENT: Throat- no lesions, no neck nodes or sinus tenderness  NECK: normal, supple, no lymphadenopathy,  no carotid bruits  PULM: clear to auscultation bilaterally- no wheezes, rales or rhonchi, normal air movement, no respiratory distress  COR:   regular rate & rhythm, no murmurs and no gallops  ABD:    soft, non-tender, non-distended, normal bowel sounds, no masses or organomegaly  : deferred  EXT:no cyanosis, clubbing , edema ,  no calf tenderness, and warm to touch. NEURO: Motor and sensory grossly intact  PSYCH:  Mood stable  SKIN:   No skin lesions or rashes  -----------------------------------------------------------------  Diagnostic Data:   · All diagnostic data was reviewed    Assessment:        ICD-10-CM    1. Essential hypertension  I10    2. Iron deficiency anemia, unspecified iron deficiency anemia type  D50.9    3. Delusional disorder, mixed type ([facility]  F22    4.  Stage 3 chronic kidney disease, unspecified whether stage 3a or 3b CKD  N18.30      Patient Active Problem List   Diagnosis Code    Essential hypertension I10    Iron deficiency anemia D50.9    Anorexia R63.0    Cardiomegaly I51.7    Degenerative disease of nervous system ([address] G31.9    Exotropia H50.10    Unspecified dementia without behavioral disturbance ([address] F03.90    Delusional disorder, mixed type ([address] F22    Obsessive compulsive disorder F42.9    Legal blindness, as defined in [place] H54.8    Stage 3 chronic kidney disease N18.30    Depression F32.9    Anxiety disorder F41.9         Plan:       Monitor bp,supportive care  Continue current medications  Hospice care  Prevent pressure sore  Prevent falls    Electronically signed by Jen Lynch MD on 4/7/2021 at 7:19 PM

## 2021-04-27 ENCOUNTER — HOSPITAL ENCOUNTER (OUTPATIENT)
Age: 86
Setting detail: SPECIMEN
Discharge: HOME | End: 2021-04-27
Payer: MEDICARE

## 2021-04-27 LAB
ALBUMIN SERPL-MCNC: 3.9 G/DL (ref 3.5–5.2)
ALBUMIN/GLOBULIN RATIO: 1.3 (ref 1–2.5)
ALP BLD-CCNC: 82 U/L (ref 35–104)
ALT SERPL-CCNC: 12 U/L (ref 5–33)
ANION GAP SERPL CALCULATED.3IONS-SCNC: 10 MMOL/L (ref 9–17)
AST SERPL-CCNC: 20 U/L
BILIRUB SERPL-MCNC: 0.18 MG/DL (ref 0.3–1.2)
BUN BLDV-MCNC: 36 MG/DL (ref 8–23)
BUN/CREAT BLD: 34 (ref 9–20)
CALCIUM SERPL-MCNC: 9.9 MG/DL (ref 8.6–10.4)
CHLORIDE BLD-SCNC: 105 MMOL/L (ref 98–107)
CO2: 24 MMOL/L (ref 20–31)
CREAT SERPL-MCNC: 1.05 MG/DL (ref 0.5–0.9)
GFR AFRICAN AMERICAN: >60 ML/MIN
GFR NON-AFRICAN AMERICAN: 50 ML/MIN
GFR SERPL CREATININE-BSD FRML MDRD: ABNORMAL ML/MIN/{1.73_M2}
GFR SERPL CREATININE-BSD FRML MDRD: ABNORMAL ML/MIN/{1.73_M2}
GLUCOSE BLD-MCNC: 89 MG/DL (ref 70–99)
POTASSIUM SERPL-SCNC: 4.7 MMOL/L (ref 3.7–5.3)
PREALBUMIN: 31.3 MG/DL (ref 20–40)
SODIUM BLD-SCNC: 139 MMOL/L (ref 135–144)
TOTAL PROTEIN: 6.8 G/DL (ref 6.4–8.3)

## 2021-04-27 PROCEDURE — 80053 COMPREHEN METABOLIC PANEL: CPT

## 2021-04-27 PROCEDURE — P9603 ONE-WAY ALLOW PRORATED MILES: HCPCS

## 2021-04-27 PROCEDURE — 36415 COLL VENOUS BLD VENIPUNCTURE: CPT

## 2021-04-27 PROCEDURE — 84134 ASSAY OF PREALBUMIN: CPT

## 2021-05-11 ENCOUNTER — OUTSIDE SERVICES (OUTPATIENT)
Dept: PRIMARY CARE CLINIC | Age: 86
End: 2021-05-11
Payer: MEDICARE

## 2021-05-11 DIAGNOSIS — I10 ESSENTIAL HYPERTENSION: Primary | Chronic | ICD-10-CM

## 2021-05-11 DIAGNOSIS — D50.8 OTHER IRON DEFICIENCY ANEMIA: Chronic | ICD-10-CM

## 2021-05-11 DIAGNOSIS — H54.8 LEGAL BLINDNESS, AS DEFINED IN USA: ICD-10-CM

## 2021-05-11 DIAGNOSIS — F03.90 DEMENTIA WITHOUT BEHAVIORAL DISTURBANCE, UNSPECIFIED DEMENTIA TYPE: ICD-10-CM

## 2021-05-11 DIAGNOSIS — N18.30 STAGE 3 CHRONIC KIDNEY DISEASE, UNSPECIFIED WHETHER STAGE 3A OR 3B CKD (HCC): ICD-10-CM

## 2021-05-11 NOTE — PROGRESS NOTES
Provider, MD   polyethylene glycol (GLYCOLAX) powder Take 17 g by mouth daily    Historical Provider, MD   amLODIPine (NORVASC) 5 MG tablet Take 5 mg by mouth daily    Historical Provider, MD   famotidine (PEPCID) 20 MG tablet Take 20 mg by mouth daily    Historical Provider, MD   vitamin D (ERGOCALCIFEROL) 42709 units CAPS capsule Take 50,000 Units by mouth every 14 days     Historical Provider, MD   acetaminophen 650 MG TABS Take 650 mg by mouth every 4 hours as needed. 12/23/14   Jose Nj MD     Allergies:  Patient has no known allergies. Past Medical History:    Past Medical History:   Diagnosis Date    Anemia     Anorexia     Anxiety disorder 1/27/2020    Blind     Cardiomegaly     Chronic kidney disease     Dementia ([facility]     Depression     Hypertension     OCD (obsessive compulsive disorder)        Past Surgical History:    Past Surgical History:   Procedure Laterality Date    CATARACT REMOVAL         Social History:   Social History     Tobacco Use    Smoking status: Never Smoker    Smokeless tobacco: Never Used   Substance Use Topics    Alcohol use: Not on file       Family History:   No family history on file. Review of Systems:  Constitutional: negative for fevers or chills  Eyes: blind both eyes  ENT: negative for sore throat or nasal congestion,no dysphagia  Respiratory: neg for shortness of breath , cough  Cardiovascular: neg for chest pain , palpitations,pnd,negative for leg edema  Gastrointestinal: neg for for abd pain, nausea, vomiting, diarrhea or constipation,no gene,no blood in stool,  Appetite better  Genitourinary: negative for dysuria, urgency,hematuria or frequency  Integument/breast: negative for skin rash or lesions  Neurological: negative for unilateral weakness, numbness or tingling.   Muscular Skeletal: no joint pain   Psych :mood stable, keeps talking about going home    Objective:    Vitals: BP: 133/56 T:97.4 P:72 R:18 -----------------------------------------------------------------  Exam:  GEN:   Awake, not in any distress, poorly nurished  EYES:  Blind both eyes  ENT: Throat- no lesions, no neck nodes or sinus tenderness  NECK: normal, supple, no lymphadenopathy,  no carotid bruits  PULM: clear to auscultation bilaterally- no wheezes, rales or rhonchi, normal air movement, no respiratory distress  COR:   regular rate & rhythm, no murmurs and no gallops  ABD:    soft, non-tender, non-distended, normal bowel sounds, no masses or organomegaly  : deferred  EXT:no cyanosis, clubbing , edema ,  no calf tenderness, and warm to touch. NEURO: Motor and sensory grossly intact  PSYCH:  Mood stable  SKIN:   No skin lesions or rashes  -----------------------------------------------------------------  Diagnostic Data:   · All diagnostic data was reviewed    Assessment:        ICD-10-CM    1. Essential hypertension  I10    2. Other iron deficiency anemia  D50.8    3. Dementia without behavioral disturbance, unspecified dementia type ([facility]  F03.90    4. Stage 3 chronic kidney disease, unspecified whether stage 3a or 3b CKD  N18.30    5.  Legal blindness, as defined in [place]  H54.8      Patient Active Problem List   Diagnosis Code    Essential hypertension I10    Iron deficiency anemia D50.9    Anorexia R63.0    Cardiomegaly I51.7    Degenerative disease of nervous system ([facility] G31.9    Exotropia H50.10    Unspecified dementia without behavioral disturbance ([facility] F03.90    Delusional disorder, mixed type ([facility] F22    Obsessive compulsive disorder F42.9    Legal blindness, as defined in [place] H54.8    Stage 3 chronic kidney disease N18.30    Depression F32.9    Anxiety disorder F41.9         Plan:       Monitor bp,supportive care  Continue current medications  Hospice care  Prevent pressure sore  Prevent falls    Electronically signed by Laith Eaton MD on 5/12/2021 at 7:32 PM

## 2021-05-12 PROCEDURE — 99308 SBSQ NF CARE LOW MDM 20: CPT | Performed by: FAMILY MEDICINE

## 2021-05-17 ENCOUNTER — HOSPITAL ENCOUNTER (OUTPATIENT)
Age: 86
Setting detail: SPECIMEN
Discharge: HOME | End: 2021-05-17
Payer: MEDICARE

## 2021-05-17 LAB
ALBUMIN SERPL-MCNC: 3.9 G/DL (ref 3.5–5.2)
ALBUMIN/GLOBULIN RATIO: 1.5 (ref 1–2.5)
ALP BLD-CCNC: 84 U/L (ref 35–104)
ALT SERPL-CCNC: 11 U/L (ref 5–33)
ANION GAP SERPL CALCULATED.3IONS-SCNC: 10 MMOL/L (ref 9–17)
AST SERPL-CCNC: 20 U/L
BILIRUB SERPL-MCNC: 0.17 MG/DL (ref 0.3–1.2)
BUN BLDV-MCNC: 33 MG/DL (ref 8–23)
BUN/CREAT BLD: 29 (ref 9–20)
CALCIUM SERPL-MCNC: 9.6 MG/DL (ref 8.6–10.4)
CHLORIDE BLD-SCNC: 108 MMOL/L (ref 98–107)
CHOLESTEROL/HDL RATIO: 4
CHOLESTEROL: 232 MG/DL
CO2: 24 MMOL/L (ref 20–31)
CREAT SERPL-MCNC: 1.15 MG/DL (ref 0.5–0.9)
GFR AFRICAN AMERICAN: 54 ML/MIN
GFR NON-AFRICAN AMERICAN: 45 ML/MIN
GFR SERPL CREATININE-BSD FRML MDRD: ABNORMAL ML/MIN/{1.73_M2}
GFR SERPL CREATININE-BSD FRML MDRD: ABNORMAL ML/MIN/{1.73_M2}
GLUCOSE BLD-MCNC: 97 MG/DL (ref 70–99)
HDLC SERPL-MCNC: 58 MG/DL
LDL CHOLESTEROL: 153 MG/DL (ref 0–130)
POTASSIUM SERPL-SCNC: 4.6 MMOL/L (ref 3.7–5.3)
SODIUM BLD-SCNC: 142 MMOL/L (ref 135–144)
TOTAL PROTEIN: 6.5 G/DL (ref 6.4–8.3)
TRIGL SERPL-MCNC: 103 MG/DL
TSH SERPL DL<=0.05 MIU/L-ACNC: 2.07 MIU/L (ref 0.3–5)
VLDLC SERPL CALC-MCNC: ABNORMAL MG/DL (ref 1–30)

## 2021-05-17 PROCEDURE — 80061 LIPID PANEL: CPT

## 2021-05-17 PROCEDURE — 84443 ASSAY THYROID STIM HORMONE: CPT

## 2021-05-17 PROCEDURE — 80053 COMPREHEN METABOLIC PANEL: CPT

## 2021-05-17 PROCEDURE — P9603 ONE-WAY ALLOW PRORATED MILES: HCPCS

## 2021-05-17 PROCEDURE — 36415 COLL VENOUS BLD VENIPUNCTURE: CPT

## 2021-06-09 ENCOUNTER — OUTSIDE SERVICES (OUTPATIENT)
Dept: PRIMARY CARE CLINIC | Age: 86
End: 2021-06-09
Payer: MEDICARE

## 2021-06-09 DIAGNOSIS — I10 ESSENTIAL HYPERTENSION: Primary | ICD-10-CM

## 2021-06-09 DIAGNOSIS — D50.8 OTHER IRON DEFICIENCY ANEMIA: ICD-10-CM

## 2021-06-09 DIAGNOSIS — H54.8 LEGAL BLINDNESS, AS DEFINED IN USA: ICD-10-CM

## 2021-06-09 DIAGNOSIS — N18.30 STAGE 3 CHRONIC KIDNEY DISEASE, UNSPECIFIED WHETHER STAGE 3A OR 3B CKD (HCC): ICD-10-CM

## 2021-06-09 DIAGNOSIS — F03.90 DEMENTIA WITHOUT BEHAVIORAL DISTURBANCE, UNSPECIFIED DEMENTIA TYPE: ICD-10-CM

## 2021-06-09 PROCEDURE — 99308 SBSQ NF CARE LOW MDM 20: CPT | Performed by: FAMILY MEDICINE

## 2021-06-09 NOTE — PROGRESS NOTES
Patient:  Justo Carrasco, 1935  I saw this patient on 06/09/2021, at [facility]   Mood stable,hospice services has been discontinued. Wants to go home  Denies any pain  She feels well and weight has been stable  BP fluctuates      Reason for Visit:      ICD-10-CM    1. Essential hypertension  I10    2. Dementia without behavioral disturbance, unspecified dementia type ([facility]  F03.90    3. Stage 3 chronic kidney disease, unspecified whether stage 3a or 3b CKD (HCC)  N18.30    4. Other iron deficiency anemia  D50.8    5. Legal blindness, as defined in [place]  H54.8        Changes since last visit:   Mood stable  Denies pain  1. Fall:  none  2. Behavioral Change: mood stable  3. Pain Control: no issues  4. Mobility: no change  5. Pressure Sore:  none  Current medications    Medication Sig Start Date End Date Taking?  Authorizing Provider   amitriptyline (ELAVIL) 50 MG tablet Take 50 mg by mouth nightly    Historical Provider, MD   aluminum & magnesium hydroxide-simethicone (MAALOX) 200-200-20 MG/5ML SUSP suspension Take 30 mLs by mouth every 6 hours as needed for Indigestion    Historical Provider, MD   brimonidine (ALPHAGAN P) 0.15 % ophthalmic solution Place 1 drop into the right eye 2 times daily    Historical Provider, MD   bisacodyl (DULCOLAX) 10 MG suppository Place 10 mg rectally daily as needed for Constipation    Historical Provider, MD   dextromethorphan (DELSYM) 30 MG/5ML extended release liquid Take 30 mg by mouth 2 times daily as needed for Cough    Historical Provider, MD   ferrous sulfate 325 (65 Fe) MG tablet Take 325 mg by mouth daily (with breakfast)    Historical Provider, MD   Sodium Phosphates (FLEET) 7-19 GM/118ML Place 1 enema rectally once as needed    Historical Provider, MD   latanoprost (XALATAN) 0.005 % ophthalmic solution Place 1 drop into the right eye nightly    Historical Provider, MD   magnesium hydroxide (MILK OF MAGNESIA CONCENTRATE) 2400 MG/10ML SUSP Take 2,400 mg by mouth daily as needed    Historical Provider, MD   polyethylene glycol (GLYCOLAX) powder Take 17 g by mouth daily    Historical Provider, MD   amLODIPine (NORVASC) 5 MG tablet Take 5 mg by mouth daily    Historical Provider, MD   famotidine (PEPCID) 20 MG tablet Take 20 mg by mouth daily    Historical Provider, MD   vitamin D (ERGOCALCIFEROL) 46857 units CAPS capsule Take 50,000 Units by mouth every 14 days     Historical Provider, MD   acetaminophen 650 MG TABS Take 650 mg by mouth every 4 hours as needed. 12/23/14   Lauro Rojas MD     Allergies:  Patient has no known allergies. Past Medical History:    Past Medical History:   Diagnosis Date    Anemia     Anorexia     Anxiety disorder 1/27/2020    Blind     Cardiomegaly     Chronic kidney disease     Dementia ([facility]     Depression     Hypertension     OCD (obsessive compulsive disorder)        Past Surgical History:    Past Surgical History:   Procedure Laterality Date    CATARACT REMOVAL         Social History:   Social History     Tobacco Use    Smoking status: Never Smoker    Smokeless tobacco: Never Used   Substance Use Topics    Alcohol use: Not on file       Family History:   No family history on file. Review of Systems:  Constitutional: negative for fevers or chills  Eyes: blind both eyes  ENT: negative for sore throat or nasal congestion,no dysphagia  Respiratory: neg for shortness of breath , cough  Cardiovascular: neg for chest pain , palpitations,pnd,negative for leg edema  Gastrointestinal: neg for for abd pain, nausea, vomiting, diarrhea or constipation,no gene,no blood in stool,  Appetite better  Genitourinary: negative for dysuria, urgency,hematuria or frequency  Integument/breast: negative for skin rash or lesions  Neurological: negative for unilateral weakness, numbness or tingling.   Muscular Skeletal: no joint pain   Psych :mood stable, keeps talking about going home    Objective:    Vitals: BP: 166/77 T:98.6 P:71

## 2021-07-09 ENCOUNTER — OUTSIDE SERVICES (OUTPATIENT)
Dept: PRIMARY CARE CLINIC | Age: 86
End: 2021-07-09
Payer: MEDICARE

## 2021-07-09 DIAGNOSIS — N18.30 STAGE 3 CHRONIC KIDNEY DISEASE, UNSPECIFIED WHETHER STAGE 3A OR 3B CKD (HCC): ICD-10-CM

## 2021-07-09 DIAGNOSIS — H54.8 LEGAL BLINDNESS, AS DEFINED IN USA: ICD-10-CM

## 2021-07-09 DIAGNOSIS — D50.8 OTHER IRON DEFICIENCY ANEMIA: ICD-10-CM

## 2021-07-09 DIAGNOSIS — F03.90 DEMENTIA WITHOUT BEHAVIORAL DISTURBANCE, UNSPECIFIED DEMENTIA TYPE: ICD-10-CM

## 2021-07-09 DIAGNOSIS — I10 ESSENTIAL HYPERTENSION: Primary | ICD-10-CM

## 2021-07-09 NOTE — PROGRESS NOTES
Patient:  Sera Ha, 1935  I saw this patient on 07/10/2021, at [facility]   Mood stable  Wants to go home  Denies any pain  She feels well and weight has been stable  BP fluctuates      Reason for Visit:      ICD-10-CM    1. Essential hypertension  I10    2. Dementia without behavioral disturbance, unspecified dementia type ([facility]  F03.90    3. Stage 3 chronic kidney disease, unspecified whether stage 3a or 3b CKD (HCC)  N18.30    4. Other iron deficiency anemia  D50.8    5. Legal blindness, as defined in [place]  H54.8        Changes since last visit:   BP higher  Mood stable  Denies pain  1. Fall:  none  2. Behavioral Change: mood stable  3. Pain Control: no issues  4. Mobility: no change  5. Pressure Sore:  none  Current medications    Medication Sig Start Date End Date Taking?  Authorizing Provider   amitriptyline (ELAVIL) 50 MG tablet Take 50 mg by mouth nightly    Historical Provider, MD   aluminum & magnesium hydroxide-simethicone (MAALOX) 200-200-20 MG/5ML SUSP suspension Take 30 mLs by mouth every 6 hours as needed for Indigestion    Historical Provider, MD   brimonidine (ALPHAGAN P) 0.15 % ophthalmic solution Place 1 drop into the right eye 2 times daily    Historical Provider, MD   bisacodyl (DULCOLAX) 10 MG suppository Place 10 mg rectally daily as needed for Constipation    Historical Provider, MD   dextromethorphan (DELSYM) 30 MG/5ML extended release liquid Take 30 mg by mouth 2 times daily as needed for Cough    Historical Provider, MD   ferrous sulfate 325 (65 Fe) MG tablet Take 325 mg by mouth daily (with breakfast)    Historical Provider, MD   Sodium Phosphates (FLEET) 7-19 GM/118ML Place 1 enema rectally once as needed    Historical Provider, MD   latanoprost (XALATAN) 0.005 % ophthalmic solution Place 1 drop into the right eye nightly    Historical Provider, MD   magnesium hydroxide (MILK OF MAGNESIA CONCENTRATE) 2400 MG/10ML SUSP Take 2,400 mg by mouth daily as needed    Historical Provider, MD   polyethylene glycol (GLYCOLAX) powder Take 17 g by mouth daily    Historical Provider, MD   amLODIPine (NORVASC) 5 MG tablet Take 5 mg by mouth daily    Historical Provider, MD   famotidine (PEPCID) 20 MG tablet Take 20 mg by mouth daily    Historical Provider, MD   vitamin D (ERGOCALCIFEROL) 74485 units CAPS capsule Take 50,000 Units by mouth every 14 days     Historical Provider, MD   acetaminophen 650 MG TABS      Cozaar 25 mg Take 650 mg by mouth every 4 hours as needed. 25 mg daily 12/23/14   Vale Murguia MD     Allergies:  Patient has no known allergies. Past Medical History:    Past Medical History:   Diagnosis Date    Anemia     Anorexia     Anxiety disorder 1/27/2020    Blind     Cardiomegaly     Chronic kidney disease     Dementia ([facility]     Depression     Hypertension     OCD (obsessive compulsive disorder)        Past Surgical History:    Past Surgical History:   Procedure Laterality Date    CATARACT REMOVAL         Social History:   Social History     Tobacco Use    Smoking status: Never Smoker    Smokeless tobacco: Never Used   Substance Use Topics    Alcohol use: Not on file       Family History:   No family history on file. Review of Systems:  Constitutional: negative for fevers or chills  Eyes: blind both eyes  ENT: negative for sore throat or nasal congestion,no dysphagia  Respiratory: neg for shortness of breath , cough  Cardiovascular: neg for chest pain , palpitations,pnd,negative for leg edema  Gastrointestinal: neg for for abd pain, nausea, vomiting, diarrhea or constipation,no gene,no blood in stool,  Appetite better  Genitourinary: negative for dysuria, urgency,hematuria or frequency  Integument/breast: negative for skin rash or lesions  Neurological: negative for unilateral weakness, numbness or tingling.   Muscular Skeletal: no joint pain   Psych :mood stable, keeps talking about going home    Objective:    Vitals: BP: 147/65 T:97.6 P:75 R:18  -----------------------------------------------------------------  Exam:  GEN:   Awake, not in any distress,   EYES:  Blind both eyes  ENT: Throat- no lesions, no neck nodes or sinus tenderness  NECK: normal, supple, no lymphadenopathy,  no carotid bruits  PULM: clear to auscultation bilaterally- no wheezes, rales or rhonchi, normal air movement, no respiratory distress  COR:   regular rate & rhythm, no murmurs and no gallops  ABD:    soft, non-tender, non-distended, normal bowel sounds, no masses or organomegaly  : deferred  EXT:no cyanosis, clubbing , edema ,  no calf tenderness, and warm to touch. NEURO: Motor and sensory grossly intact  PSYCH:  Mood stable  SKIN:   No skin lesions or rashes  -----------------------------------------------------------------  Diagnostic Data:   · All diagnostic data was reviewed    Assessment:        ICD-10-CM    1. Essential hypertension  I10    2. Dementia without behavioral disturbance, unspecified dementia type ([facility]  F03.90    3. Stage 3 chronic kidney disease, unspecified whether stage 3a or 3b CKD (HCC)  N18.30    4. Other iron deficiency anemia  D50.8    5.  Legal blindness, as defined in [place]  H54.8      Patient Active Problem List   Diagnosis Code    Essential hypertension I10    Iron deficiency anemia D50.9    Anorexia R63.0    Cardiomegaly I51.7    Degenerative disease of nervous system ([facility] G31.9    Exotropia H50.10    Unspecified dementia without behavioral disturbance ([facility] F03.90    Delusional disorder, mixed type ([facility] F22    Obsessive compulsive disorder F42.9    Legal blindness, as defined in [place] H54.8    Stage 3 chronic kidney disease ([facility] N18.30    Depression F32.9    Anxiety disorder F41.9         Plan:     Add cozaar 25 mg daily  Monitor bp,supportive care  Continue current medications  Prevent pressure sore  Prevent falls    Electronically signed by Brittni Robbins MD on 7/11/2021 at 2:44 PM

## 2021-07-10 PROCEDURE — 99308 SBSQ NF CARE LOW MDM 20: CPT | Performed by: FAMILY MEDICINE

## 2021-07-11 RX ORDER — LOSARTAN POTASSIUM 25 MG/1
25 TABLET ORAL DAILY
COMMUNITY
End: 2021-08-11

## 2021-08-06 ENCOUNTER — OUTSIDE SERVICES (OUTPATIENT)
Dept: FAMILY MEDICINE CLINIC | Age: 86
End: 2021-08-06
Payer: MEDICARE

## 2021-08-06 DIAGNOSIS — I10 ESSENTIAL HYPERTENSION: Primary | ICD-10-CM

## 2021-08-06 DIAGNOSIS — D50.9 IRON DEFICIENCY ANEMIA, UNSPECIFIED IRON DEFICIENCY ANEMIA TYPE: ICD-10-CM

## 2021-08-06 DIAGNOSIS — N18.30 STAGE 3 CHRONIC KIDNEY DISEASE, UNSPECIFIED WHETHER STAGE 3A OR 3B CKD (HCC): ICD-10-CM

## 2021-08-06 DIAGNOSIS — F03.90 DEMENTIA WITHOUT BEHAVIORAL DISTURBANCE, UNSPECIFIED DEMENTIA TYPE: ICD-10-CM

## 2021-08-06 NOTE — PROGRESS NOTES
Due to this being a TeleHealth encounter (During TWOFW-35 public health emergency), evaluation of the following organ systems was limited: Vitals/Constitutional/EENT/Resp/CV/GI//MS/Neuro/Skin/Heme-Lymph-Imm. Pursuant to the emergency declaration under the [address] [place] [address], [address] [place] [address] and the [employer] Act, this Virtual Visit was conducted with patient's (and/or legal guardian's) verbal consent, to reduce the patient's risk of exposure to COVID-19 and provide necessary medical care. The patient (and/or legal guardian) has also been advised to contact this office for worsening conditions or problems, and seek emergency medical treatment and/or call 911 if deemed necessary. Services were provided through a video synchronous discussion virtually to substitute for in-person clinic visit. Patient was located at their individual home. Patient:  Patricia Pink, 1935  I saw this patient on 08/11/2021, at [facility] via virtual video visit  Mood stable  Denies any pain  She feels well and weight has been stable  BP fluctuates      Reason for Visit:      ICD-10-CM    1. Essential hypertension  I10    2. Iron deficiency anemia, unspecified iron deficiency anemia type  D50.9    3. Stage 3 chronic kidney disease, unspecified whether stage 3a or 3b CKD (HCC)  N18.30    4. Dementia without behavioral disturbance, unspecified dementia type ([facility]  F03.90        Changes since last visit:   BP better  Mood stable  Denies pain  1. Fall:  none  2. Behavioral Change: mood stable  3. Pain Control: no issues  4. Mobility: no change  5. Pressure Sore:  none  Allergies:  Patient has no known allergies.      Current Outpatient Medications   Medication Sig Dispense Refill    losartan (COZAAR) 50 MG tablet Take 1 tablet by mouth daily 90 tablet 0    LORazepam (ATIVAN) 0.5 MG tablet Take 0.5 mg by mouth every 4 hours as needed for Anxiety (AND BID).  hyoscyamine (LEVSIN) 125 MCG/ML solution Take 0.125 mg by mouth every 4 hours as needed      promethazine (PHENERGAN) 25 MG tablet Take 25 mg by mouth every 6 hours as needed for Nausea      amitriptyline (ELAVIL) 50 MG tablet Take 50 mg by mouth nightly      aluminum & magnesium hydroxide-simethicone (MAALOX) 200-200-20 MG/5ML SUSP suspension Take 30 mLs by mouth every 4 hours as needed for Indigestion       brimonidine (ALPHAGAN P) 0.15 % ophthalmic solution Place 1 drop into the right eye 2 times daily      bisacodyl (DULCOLAX) 10 MG suppository Place 10 mg rectally daily as needed for Constipation      dextromethorphan (DELSYM) 30 MG/5ML extended release liquid Take 30 mg by mouth 2 times daily as needed for Cough      Sodium Phosphates (FLEET) 7-19 GM/118ML Place 1 enema rectally once as needed      latanoprost (XALATAN) 0.005 % ophthalmic solution Place 1 drop into the right eye nightly      magnesium hydroxide (MILK OF MAGNESIA CONCENTRATE) 2400 MG/10ML SUSP Take 2,400 mg by mouth daily as needed      polyethylene glycol (GLYCOLAX) powder Take 17 g by mouth daily      acetaminophen 650 MG TABS Take 650 mg by mouth every 4 hours as needed. 120 tablet 3     No current facility-administered medications for this visit. Past Medical History:    Past Medical History:   Diagnosis Date    Anemia     Anorexia     Anxiety disorder 1/27/2020    Blind     Cardiomegaly     Chronic kidney disease     Dementia ([facility]     Depression     Hypertension     OCD (obsessive compulsive disorder)        Past Surgical History:    Past Surgical History:   Procedure Laterality Date    CATARACT REMOVAL         Social History:   Social History     Tobacco Use    Smoking status: Never Smoker    Smokeless tobacco: Never Used   Substance Use Topics    Alcohol use: Not on file       Family History:   No family history on file.         Review of Systems:  Constitutional: negative for fevers or chills  Eyes: blind both eyes  ENT: negative for sore throat or nasal congestion,no dysphagia  Respiratory: neg for shortness of breath , cough  Cardiovascular: neg for chest pain , palpitations,pnd,negative for leg edema  Gastrointestinal: neg for for abd pain, nausea, vomiting, diarrhea or constipation,no gene,no blood in stool,  Appetite better  Genitourinary: negative for dysuria, urgency,hematuria or frequency  Integument/breast: negative for skin rash or lesions  Neurological: negative for unilateral weakness, numbness or tingling. Muscular Skeletal: no joint pain   Psych :mood stable, keeps talking about going home    Objective:    Vitals: BP: 149/59 T:98.6 P:84 R:18  -----------------------------------------------------------------  Exam:  GEN:   Awake, not in any distress,   PSYCH:  Mood stable    -----------------------------------------------------------------  Diagnostic Data:   · All diagnostic data was reviewed    Assessment:        ICD-10-CM    1. Essential hypertension  I10    2. Iron deficiency anemia, unspecified iron deficiency anemia type  D50.9    3. Stage 3 chronic kidney disease, unspecified whether stage 3a or 3b CKD ([facility])  N18.30    4.  Dementia without behavioral disturbance, unspecified dementia type ([facility]  F03.90      Patient Active Problem List   Diagnosis Code    Essential hypertension I10    Iron deficiency anemia D50.9    Anorexia R63.0    Cardiomegaly I51.7    Degenerative disease of nervous system ([facility] G31.9    Exotropia H50.10    Unspecified dementia without behavioral disturbance ([facility] F03.90    Delusional disorder, mixed type ([facility] F22    Obsessive compulsive disorder F42.9    Legal blindness, as defined in [place] H54.8    Stage 3 chronic kidney disease ([facility] N18.30    Depression F32.9    Anxiety disorder F41.9         Plan:       Monitor bp,supportive care  Continue current medications  Prevent pressure sore  Prevent falls    Electronically signed by Amee Barnhart MD on 8/15/2021 at 7:19 PM

## 2021-08-11 PROCEDURE — 99308 SBSQ NF CARE LOW MDM 20: CPT | Performed by: FAMILY MEDICINE

## 2021-08-11 RX ORDER — LOSARTAN POTASSIUM 50 MG/1
50 TABLET ORAL DAILY
Qty: 90 TABLET | Refills: 0 | Status: SHIPPED | OUTPATIENT
Start: 2021-08-11

## 2021-09-03 ENCOUNTER — OUTSIDE SERVICES (OUTPATIENT)
Dept: PRIMARY CARE CLINIC | Age: 86
End: 2021-09-03
Payer: MEDICARE

## 2021-09-03 DIAGNOSIS — D50.8 OTHER IRON DEFICIENCY ANEMIA: ICD-10-CM

## 2021-09-03 DIAGNOSIS — F03.90 DEMENTIA WITHOUT BEHAVIORAL DISTURBANCE, UNSPECIFIED DEMENTIA TYPE: ICD-10-CM

## 2021-09-03 DIAGNOSIS — I10 ESSENTIAL HYPERTENSION: Primary | ICD-10-CM

## 2021-09-03 DIAGNOSIS — N18.30 STAGE 3 CHRONIC KIDNEY DISEASE, UNSPECIFIED WHETHER STAGE 3A OR 3B CKD (HCC): ICD-10-CM

## 2021-09-03 NOTE — PROGRESS NOTES
Patient:  Uma Abbott, 1935  I saw this patient on 09/05/2021, at [facility] via virtual video visit  Mood stable  Denies any pain  She feels well and weight has been stable  BP better      Reason for Visit:      ICD-10-CM    1. Essential hypertension  I10    2. Dementia without behavioral disturbance, unspecified dementia type ([address]  F03.90    3. Stage 3 chronic kidney disease, unspecified whether stage 3a or 3b CKD (HCC)  N18.30    4. Other iron deficiency anemia  D50.8        Changes since last visit:   BP better  Mood stable  Denies pain  1. Fall:  none  2. Behavioral Change: mood stable  3. Pain Control: no issues  4. Mobility: no change  5. Pressure Sore:  none  Allergies:  Patient has no known allergies. Current Outpatient Medications   Medication Sig Dispense Refill    brimonidine (ALPHAGAN) 0.2 % ophthalmic solution       hydrocortisone 2.5 % cream Apply topically 3 times daily as needed Apply to affected area topically as needed for bee sting to right inner elbow TID      omeprazole 20 MG EC tablet Take 20 mg by mouth 2 times daily      losartan (COZAAR) 50 MG tablet Take 1 tablet by mouth daily 90 tablet 0    LORazepam (ATIVAN) 0.5 MG tablet Take 0.5 mg by mouth 2 times daily.        hyoscyamine (LEVSIN) 125 MCG/ML solution Take 0.125 mg by mouth every 4 hours as needed      promethazine (PHENERGAN) 25 MG tablet Take 25 mg by mouth every 6 hours as needed for Nausea      amitriptyline (ELAVIL) 50 MG tablet Take 50 mg by mouth nightly      aluminum & magnesium hydroxide-simethicone (MAALOX) 200-200-20 MG/5ML SUSP suspension Take 30 mLs by mouth every 4 hours as needed for Indigestion       bisacodyl (DULCOLAX) 10 MG suppository Place 10 mg rectally daily as needed for Constipation      dextromethorphan (DELSYM) 30 MG/5ML extended release liquid Take 30 mg by mouth 2 times daily as needed for Cough      Sodium Phosphates (FLEET) 7-19 GM/118ML Place 1 enema rectally once as needed  latanoprost (XALATAN) 0.005 % ophthalmic solution Place 1 drop into the right eye nightly      magnesium hydroxide (MILK OF MAGNESIA CONCENTRATE) 2400 MG/10ML SUSP Take 2,400 mg by mouth daily as needed      polyethylene glycol (GLYCOLAX) powder Take 17 g by mouth daily      acetaminophen 650 MG TABS Take 650 mg by mouth every 4 hours as needed. (Patient taking differently: Take 650 mg by mouth every 4 hours as needed 3 times a day for pain & every 4 hours PRN) 120 tablet 3     No current facility-administered medications for this visit. Past Medical History:    Past Medical History:   Diagnosis Date    Anemia     Anorexia     Anxiety disorder 1/27/2020    Blind     Cardiomegaly     Chronic kidney disease     Dementia ([facility] [address]     Depression     Hypertension     OCD (obsessive compulsive disorder)        Past Surgical History:    Past Surgical History:   Procedure Laterality Date    CATARACT REMOVAL         Social History:   Social History     Tobacco Use    Smoking status: Never Smoker    Smokeless tobacco: Never Used   Substance Use Topics    Alcohol use: Not on file       Family History:   No family history on file. Review of Systems:  Constitutional: negative for fevers or chills  Eyes: blind both eyes  ENT: negative for sore throat or nasal congestion,no dysphagia  Respiratory: neg for shortness of breath , cough  Cardiovascular: neg for chest pain , palpitations,pnd,negative for leg edema  Gastrointestinal: neg for for abd pain, nausea, vomiting, diarrhea or constipation,no gene,no blood in stool,  Appetite better  Genitourinary: negative for dysuria, urgency,hematuria or frequency  Integument/breast: negative for skin rash or lesions  Neurological: negative for unilateral weakness, numbness or tingling.   Muscular Skeletal: no joint pain   Psych :mood stable, keeps talking about going home    Objective:    Vitals: BP: 116/65 T:97.8 P:73 R:16  -----------------------------------------------------------------  Exam:    Estiven Lui, not in any distress,   EYES:  Blind both eyes  ENT: Throat- no lesions, no neck nodes or sinus tenderness  NECK: normal, supple, no lymphadenopathy,  no carotid bruits  PULM: clear to auscultation bilaterally- no wheezes, rales or rhonchi, normal air movement, no respiratory distress  COR:   regular rate & rhythm, no murmurs and no gallops  ABD:    soft, non-tender, non-distended, normal bowel sounds, no masses or organomegaly  : deferred  EXT:no cyanosis, clubbing , edema ,  no calf tenderness, and warm to touch. NEURO: Motor and sensory grossly intact  PSYCH:  Mood stable  SKIN:   No skin lesions or rashes    -----------------------------------------------------------------  Diagnostic Data:   · All diagnostic data was reviewed    Assessment:        ICD-10-CM    1. Essential hypertension  I10    2. Dementia without behavioral disturbance, unspecified dementia type ([facility]  F03.90    3. Stage 3 chronic kidney disease, unspecified whether stage 3a or 3b CKD (HCC)  N18.30    4.  Other iron deficiency anemia  D50.8      Patient Active Problem List   Diagnosis Code    Essential hypertension I10    Iron deficiency anemia D50.9    Anorexia R63.0    Cardiomegaly I51.7    Degenerative disease of nervous system ([facility] G31.9    Exotropia H50.10    Unspecified dementia without behavioral disturbance ([facility] F03.90    Delusional disorder, mixed type ([facility] F22    Obsessive compulsive disorder F42.9    Legal blindness, as defined in [place] H54.8    Stage 3 chronic kidney disease ([facility] N18.30    Depression F32.9    Anxiety disorder F41.9         Plan:       Monitor bp,supportive care  Continue current medications  Prevent pressure sore  Prevent falls    Electronically signed by Francesco Cruz MD on 9/8/2021 at 6:15 PM

## 2021-09-05 PROCEDURE — 99308 SBSQ NF CARE LOW MDM 20: CPT | Performed by: FAMILY MEDICINE

## 2021-10-06 PROCEDURE — 99308 SBSQ NF CARE LOW MDM 20: CPT | Performed by: FAMILY MEDICINE

## 2021-10-11 ENCOUNTER — OUTSIDE SERVICES (OUTPATIENT)
Dept: PRIMARY CARE CLINIC | Age: 86
End: 2021-10-11
Payer: MEDICARE

## 2021-10-11 DIAGNOSIS — D50.8 OTHER IRON DEFICIENCY ANEMIA: ICD-10-CM

## 2021-10-11 DIAGNOSIS — F03.90 DEMENTIA WITHOUT BEHAVIORAL DISTURBANCE, UNSPECIFIED DEMENTIA TYPE: ICD-10-CM

## 2021-10-11 DIAGNOSIS — N18.30 STAGE 3 CHRONIC KIDNEY DISEASE, UNSPECIFIED WHETHER STAGE 3A OR 3B CKD (HCC): ICD-10-CM

## 2021-10-11 DIAGNOSIS — I10 ESSENTIAL HYPERTENSION: Primary | ICD-10-CM

## 2021-10-11 NOTE — PROGRESS NOTES
Patient:  Yadiel Teixeira, 1935  I saw this patient on 10/06/2021, at [facility]   Mood stable  Denies any pain  She feels well and weight has been stable  BP better      Reason for Visit:      ICD-10-CM    1. Essential hypertension  I10    2. Dementia without behavioral disturbance, unspecified dementia type ([facility] [facility]  F03.90    3. Stage 3 chronic kidney disease, unspecified whether stage 3a or 3b CKD (HCC)  N18.30    4. Other iron deficiency anemia  D50.8        Changes since last visit:   BP better  Mood stable  Denies pain  1. Fall:  none  2. Behavioral Change: mood stable  3. Pain Control: no issues  4. Mobility: no change  5. Pressure Sore:  none  Allergies:  Patient has no known allergies. Current Outpatient Medications   Medication Sig Dispense Refill    brimonidine (ALPHAGAN) 0.2 % ophthalmic solution       hydrocortisone 2.5 % cream Apply topically 3 times daily as needed Apply to affected area topically as needed for bee sting to right inner elbow TID      omeprazole 20 MG EC tablet Take 20 mg by mouth 2 times daily      losartan (COZAAR) 50 MG tablet Take 1 tablet by mouth daily 90 tablet 0    LORazepam (ATIVAN) 0.5 MG tablet Take 0.5 mg by mouth 2 times daily.        hyoscyamine (LEVSIN) 125 MCG/ML solution Take 0.125 mg by mouth every 4 hours as needed      promethazine (PHENERGAN) 25 MG tablet Take 25 mg by mouth every 6 hours as needed for Nausea      amitriptyline (ELAVIL) 50 MG tablet Take 50 mg by mouth nightly      aluminum & magnesium hydroxide-simethicone (MAALOX) 200-200-20 MG/5ML SUSP suspension Take 30 mLs by mouth every 4 hours as needed for Indigestion       bisacodyl (DULCOLAX) 10 MG suppository Place 10 mg rectally daily as needed for Constipation      dextromethorphan (DELSYM) 30 MG/5ML extended release liquid Take 30 mg by mouth 2 times daily as needed for Cough      Sodium Phosphates (FLEET) 7-19 GM/118ML Place 1 enema rectally once as needed      latanoprost (XALATAN) 0.005 % ophthalmic solution Place 1 drop into the right eye nightly      magnesium hydroxide (MILK OF MAGNESIA CONCENTRATE) 2400 MG/10ML SUSP Take 2,400 mg by mouth daily as needed      polyethylene glycol (GLYCOLAX) powder Take 17 g by mouth daily      acetaminophen 650 MG TABS Take 650 mg by mouth every 4 hours as needed. (Patient taking differently: Take 650 mg by mouth every 4 hours as needed 3 times a day for pain & every 4 hours PRN) 120 tablet 3     No current facility-administered medications for this visit. Past Medical History:    Past Medical History:   Diagnosis Date    Anemia     Anorexia     Anxiety disorder 1/27/2020    Blind     Cardiomegaly     Chronic kidney disease     Dementia ([facility]     Depression     Hypertension     OCD (obsessive compulsive disorder)        Past Surgical History:    Past Surgical History:   Procedure Laterality Date    CATARACT REMOVAL         Social History:   Social History     Tobacco Use    Smoking status: Never Smoker    Smokeless tobacco: Never Used   Substance Use Topics    Alcohol use: Not on file       Family History:   No family history on file. Review of Systems:  Constitutional: negative for fevers or chills  Eyes: blind both eyes  ENT: negative for sore throat or nasal congestion,no dysphagia  Respiratory: neg for shortness of breath , cough  Cardiovascular: neg for chest pain , palpitations,pnd,negative for leg edema  Gastrointestinal: neg for for abd pain, nausea, vomiting, diarrhea or constipation,no gene,no blood in stool,  Appetite better  Genitourinary: negative for dysuria, urgency,hematuria or frequency  Integument/breast: negative for skin rash or lesions  Neurological: negative for unilateral weakness, numbness or tingling.   Muscular Skeletal: no joint pain   Psych :mood stable, keeps talking about going home    Objective:    Vitals: BP: 128/72 T:97.5 P:70 R:18 WT-106lbs  -----------------------------------------------------------------  Exam:    Ricci Trevizo, not in any distress,   EYES:  Blind both eyes  ENT: Throat- no lesions, no neck nodes or sinus tenderness  NECK: normal, supple, no lymphadenopathy,  no carotid bruits  PULM: clear to auscultation bilaterally- no wheezes, rales or rhonchi, normal air movement, no respiratory distress  COR:   regular rate & rhythm, no murmurs and no gallops  ABD:    soft, non-tender, non-distended, normal bowel sounds, no masses or organomegaly  : deferred  EXT:no cyanosis, clubbing , edema ,  no calf tenderness, and warm to touch. NEURO: Motor and sensory grossly intact  PSYCH:  Mood stable  SKIN:   No skin lesions or rashes    -----------------------------------------------------------------  Diagnostic Data:   · All diagnostic data was reviewed    Assessment:        ICD-10-CM    1. Essential hypertension  I10    2. Dementia without behavioral disturbance, unspecified dementia type ([facility]  F03.90    3. Stage 3 chronic kidney disease, unspecified whether stage 3a or 3b CKD (HCC)  N18.30    4. Other iron deficiency anemia  D50.8      Patient Active Problem List   Diagnosis Code    Essential hypertension I10    Iron deficiency anemia D50.9    Anorexia R63.0    Cardiomegaly I51.7    Degenerative disease of nervous system ([facility] G31.9    Exotropia H50.10    Unspecified dementia without behavioral disturbance ([facility] F03.90    Delusional disorder, mixed type ([facility] F22    Obsessive compulsive disorder F42.9    Legal blindness, as defined in [place] H54.8    Stage 3 chronic kidney disease ([facility] N18.30    Depression F32. A    Anxiety disorder F41.9         Plan:       Monitor bp,  monitofor behavioral changes  Continue current medications  Prevent pressure sore  Prevent falls    Electronically signed by Nidhi Martinez MD on 10/14/2021 at 8:23 AM

## 2021-11-10 ENCOUNTER — OUTSIDE SERVICES (OUTPATIENT)
Dept: PRIMARY CARE CLINIC | Age: 86
End: 2021-11-10
Payer: MEDICARE

## 2021-11-10 DIAGNOSIS — D50.8 OTHER IRON DEFICIENCY ANEMIA: ICD-10-CM

## 2021-11-10 DIAGNOSIS — N18.30 STAGE 3 CHRONIC KIDNEY DISEASE, UNSPECIFIED WHETHER STAGE 3A OR 3B CKD (HCC): ICD-10-CM

## 2021-11-10 DIAGNOSIS — F03.90 DEMENTIA WITHOUT BEHAVIORAL DISTURBANCE, UNSPECIFIED DEMENTIA TYPE: ICD-10-CM

## 2021-11-10 DIAGNOSIS — I10 ESSENTIAL HYPERTENSION: Primary | ICD-10-CM

## 2021-11-10 PROCEDURE — 99308 SBSQ NF CARE LOW MDM 20: CPT | Performed by: FAMILY MEDICINE

## 2021-11-10 NOTE — PROGRESS NOTES
0.005 % ophthalmic solution Place 1 drop into the right eye nightly      magnesium hydroxide (MILK OF MAGNESIA CONCENTRATE) 2400 MG/10ML SUSP Take 2,400 mg by mouth daily as needed      polyethylene glycol (GLYCOLAX) powder Take 17 g by mouth daily      acetaminophen 650 MG TABS Take 650 mg by mouth every 4 hours as needed. (Patient taking differently: Take 650 mg by mouth every 4 hours as needed 3 times a day for pain & every 4 hours PRN) 120 tablet 3     No current facility-administered medications for this visit. Past Medical History:    Past Medical History:   Diagnosis Date    Anemia     Anorexia     Anxiety disorder 1/27/2020    Blind     Cardiomegaly     Chronic kidney disease     Dementia ([facility]     Depression     Hypertension     OCD (obsessive compulsive disorder)        Past Surgical History:    Past Surgical History:   Procedure Laterality Date    CATARACT REMOVAL         Social History:   Social History     Tobacco Use    Smoking status: Never Smoker    Smokeless tobacco: Never Used   Substance Use Topics    Alcohol use: Not on file       Family History:   No family history on file. Review of Systems:  Constitutional: negative for fevers or chills  Eyes: blind both eyes  ENT: negative for sore throat or nasal congestion,no dysphagia  Respiratory: neg for shortness of breath , cough  Cardiovascular: neg for chest pain , palpitations,pnd,negative for leg edema  Gastrointestinal: neg for for abd pain, nausea, vomiting, diarrhea or constipation,no gene,no blood in stool,  Appetite better  Genitourinary: negative for dysuria, urgency,hematuria or frequency  Integument/breast: negative for skin rash or lesions  Neurological: negative for unilateral weakness, numbness or tingling.   Muscular Skeletal: no joint pain   Psych :mood stable, keeps talking about going home    Objective:    Vitals: BP: 98/60 T:97.5 P:89 R:18 -----------------------------------------------------------------  Exam:    Saleem Lee, not in any distress,   EYES:  Blind both eyes  ENT: Throat- no lesions, no neck nodes or sinus tenderness  NECK: normal, supple, no lymphadenopathy,  no carotid bruits  PULM: clear to auscultation bilaterally- no wheezes, rales or rhonchi, normal air movement, no respiratory distress  COR:   regular rate & rhythm, no murmurs and no gallops  ABD:    soft, non-tender, non-distended, normal bowel sounds, no masses or organomegaly  : deferred  EXT:no cyanosis, clubbing , edema ,  no calf tenderness, and warm to touch. NEURO: Motor and sensory grossly intact  PSYCH:  Mood stable  SKIN:   No skin lesions or rashes    -----------------------------------------------------------------  Diagnostic Data:   · All diagnostic data was reviewed    Assessment:        ICD-10-CM    1. Essential hypertension  I10    2. Stage 3 chronic kidney disease, unspecified whether stage 3a or 3b CKD (HCC)  N18.30    3. Dementia without behavioral disturbance, unspecified dementia type ([facility]  F03.90    4. Other iron deficiency anemia  D50.8      Patient Active Problem List   Diagnosis Code    Essential hypertension I10    Iron deficiency anemia D50.9    Anorexia R63.0    Cardiomegaly I51.7    Degenerative disease of nervous system ([facility] G31.9    Exotropia H50.10    Unspecified dementia without behavioral disturbance ([facility] F03.90    Delusional disorder, mixed type ([facility] F22    Obsessive compulsive disorder F42.9    Legal blindness, as defined in [place] H54.8    Stage 3 chronic kidney disease ([facility] N18.30    Depression F32. A    Anxiety disorder F41.9         Plan:       Monitor bp,  Monitor for behavioral changes  Continue current medications  Prevent pressure sore  Prevent falls    Electronically signed by Rhiannon Woodson MD on 11/12/2021 at 8:39 AM

## 2021-12-08 ENCOUNTER — OUTSIDE SERVICES (OUTPATIENT)
Dept: PRIMARY CARE CLINIC | Age: 86
End: 2021-12-08
Payer: MEDICARE

## 2021-12-08 DIAGNOSIS — F03.90 DEMENTIA WITHOUT BEHAVIORAL DISTURBANCE, UNSPECIFIED DEMENTIA TYPE: ICD-10-CM

## 2021-12-08 DIAGNOSIS — H54.8 LEGAL BLINDNESS, AS DEFINED IN USA: ICD-10-CM

## 2021-12-08 DIAGNOSIS — I10 ESSENTIAL HYPERTENSION: Primary | ICD-10-CM

## 2021-12-08 DIAGNOSIS — N18.30 STAGE 3 CHRONIC KIDNEY DISEASE, UNSPECIFIED WHETHER STAGE 3A OR 3B CKD (HCC): ICD-10-CM

## 2021-12-08 PROCEDURE — 99308 SBSQ NF CARE LOW MDM 20: CPT | Performed by: FAMILY MEDICINE

## 2021-12-08 NOTE — PROGRESS NOTES
Patient:  Dhara Arevalo, 1935  I saw this patient on 12/08/2021, at [facility]   Mood stable  Denies any pain  She feels well and talks about going home  BP lower      Reason for Visit:      ICD-10-CM    1. Essential hypertension  I10    2. Legal blindness, as defined in [place]  H54.8    3. Stage 3 chronic kidney disease, unspecified whether stage 3a or 3b CKD (HCC)  N18.30    4. Dementia without behavioral disturbance, unspecified dementia type ([facility] [address]  F03.90        Changes since last visit:   BP better  Mood stable  Denies pain  1. Fall:  none  2. Behavioral Change: mood stable  3. Pain Control: no issues  4. Mobility: no change  5. Pressure Sore:  none  Allergies:  Patient has no known allergies. Current Outpatient Medications   Medication Sig Dispense Refill    brimonidine (ALPHAGAN) 0.2 % ophthalmic solution       hydrocortisone 2.5 % cream Apply topically 3 times daily as needed Apply to affected area topically as needed for bee sting to right inner elbow TID      omeprazole 20 MG EC tablet Take 20 mg by mouth 2 times daily      losartan (COZAAR) 50 MG tablet Take 1 tablet by mouth daily 90 tablet 0    LORazepam (ATIVAN) 0.5 MG tablet Take 0.5 mg by mouth 2 times daily.        hyoscyamine (LEVSIN) 125 MCG/ML solution Take 0.125 mg by mouth every 4 hours as needed      promethazine (PHENERGAN) 25 MG tablet Take 25 mg by mouth every 6 hours as needed for Nausea      amitriptyline (ELAVIL) 50 MG tablet Take 50 mg by mouth nightly      aluminum & magnesium hydroxide-simethicone (MAALOX) 200-200-20 MG/5ML SUSP suspension Take 30 mLs by mouth every 4 hours as needed for Indigestion       bisacodyl (DULCOLAX) 10 MG suppository Place 10 mg rectally daily as needed for Constipation      dextromethorphan (DELSYM) 30 MG/5ML extended release liquid Take 30 mg by mouth 2 times daily as needed for Cough      Sodium Phosphates (FLEET) 7-19 GM/118ML Place 1 enema rectally once as needed      latanoprost (XALATAN) 0.005 % ophthalmic solution Place 1 drop into the right eye nightly      magnesium hydroxide (MILK OF MAGNESIA CONCENTRATE) 2400 MG/10ML SUSP Take 2,400 mg by mouth daily as needed      polyethylene glycol (GLYCOLAX) powder Take 17 g by mouth daily      acetaminophen 650 MG TABS Take 650 mg by mouth every 4 hours as needed. (Patient taking differently: Take 650 mg by mouth every 4 hours as needed 3 times a day for pain & every 4 hours PRN) 120 tablet 3     No current facility-administered medications for this visit. Past Medical History:    Past Medical History:   Diagnosis Date    Anemia     Anorexia     Anxiety disorder 1/27/2020    Blind     Cardiomegaly     Chronic kidney disease     Dementia ([facility] [address]     Depression     Hypertension     OCD (obsessive compulsive disorder)        Past Surgical History:    Past Surgical History:   Procedure Laterality Date    CATARACT REMOVAL         Social History:   Social History     Tobacco Use    Smoking status: Never Smoker    Smokeless tobacco: Never Used   Substance Use Topics    Alcohol use: Not on file       Family History:   No family history on file. Review of Systems:  Constitutional: negative for fevers or chills  Eyes: blind both eyes  ENT: negative for sore throat or nasal congestion,no dysphagia  Respiratory: neg for shortness of breath , cough  Cardiovascular: neg for chest pain , palpitations,pnd,negative for leg edema  Gastrointestinal: neg for for abd pain, nausea, vomiting, diarrhea or constipation,no gene,no blood in stool,  Appetite better  Genitourinary: negative for dysuria, urgency,hematuria or frequency  Integument/breast: negative for skin rash or lesions  Neurological: negative for unilateral weakness, numbness or tingling.   Muscular Skeletal: no joint pain   Psych :mood stable, keeps talking about going home    Objective:    Vitals: BP: 130/74 T:97.0 P:73 R:18 -----------------------------------------------------------------  Exam:    Duane Faith, not in any distress,   EYES:  Blind both eyes  ENT: Throat- no lesions, no neck nodes or sinus tenderness  NECK: normal, supple, no lymphadenopathy,  no carotid bruits  PULM: clear to auscultation bilaterally- no wheezes, rales or rhonchi, normal air movement, no respiratory distress  COR:   regular rate & rhythm, no murmurs and no gallops  ABD:    soft, non-tender, non-distended, normal bowel sounds, no masses or organomegaly  : deferred  EXT:no cyanosis, clubbing , edema ,  no calf tenderness, and warm to touch. NEURO: Motor and sensory grossly intact  PSYCH:  Mood stable  SKIN:   No skin lesions or rashes    -----------------------------------------------------------------  Diagnostic Data:   · All diagnostic data was reviewed    Assessment:        ICD-10-CM    1. Essential hypertension  I10    2. Legal blindness, as defined in [place]  H54.8    3. Stage 3 chronic kidney disease, unspecified whether stage 3a or 3b CKD (HCC)  N18.30    4. Dementia without behavioral disturbance, unspecified [facility] type ([facility]  F03.90      Patient Active Problem List   Diagnosis Code    Essential hypertension I10    Iron deficiency anemia D50.9    Anorexia R63.0    Cardiomegaly I51.7    Degenerative disease of nervous system ([facility] [address] G31.9    Exotropia H50.10    Unspecified dementia without behavioral disturbance ([facility] [address] F03.90    Delusional disorder, mixed type ([facility] [address] F22    Obsessive compulsive disorder F42.9    Legal blindness, as defined in [place] H54.8    Stage 3 chronic kidney disease ([facility] [address] N18.30    Depression F32. A    Anxiety disorder F41.9         Plan:       Monitor bp,  Monitor for behavioral changes  Continue current medications  Prevent pressure sore  Prevent falls    Electronically signed by Penny Swan MD on 12/12/2021 at 1:06 PM

## 2022-01-12 PROCEDURE — 99308 SBSQ NF CARE LOW MDM 20: CPT | Performed by: FAMILY MEDICINE

## 2022-01-13 ENCOUNTER — OUTSIDE SERVICES (OUTPATIENT)
Dept: PRIMARY CARE CLINIC | Age: 87
End: 2022-01-13
Payer: MEDICARE

## 2022-01-13 DIAGNOSIS — F22 DELUSIONAL DISORDER, MIXED TYPE (HCC): ICD-10-CM

## 2022-01-13 DIAGNOSIS — F03.90 DEMENTIA WITHOUT BEHAVIORAL DISTURBANCE, UNSPECIFIED DEMENTIA TYPE: ICD-10-CM

## 2022-01-13 DIAGNOSIS — I10 ESSENTIAL HYPERTENSION: Primary | ICD-10-CM

## 2022-01-13 DIAGNOSIS — N18.30 STAGE 3 CHRONIC KIDNEY DISEASE, UNSPECIFIED WHETHER STAGE 3A OR 3B CKD (HCC): ICD-10-CM

## 2022-01-13 NOTE — PROGRESS NOTES
Place 1 drop into the right eye nightly      magnesium hydroxide (MILK OF MAGNESIA CONCENTRATE) 2400 MG/10ML SUSP Take 2,400 mg by mouth daily as needed      polyethylene glycol (GLYCOLAX) powder Take 17 g by mouth daily      acetaminophen 650 MG TABS Take 650 mg by mouth every 4 hours as needed. (Patient taking differently: Take 650 mg by mouth every 4 hours as needed 3 times a day for pain & every 4 hours PRN) 120 tablet 3     No current facility-administered medications for this visit. Past Medical History:    Past Medical History:   Diagnosis Date    Anemia     Anorexia     Anxiety disorder 1/27/2020    Blind     Cardiomegaly     Chronic kidney disease     Dementia ([facility] [address]     Depression     Hypertension     OCD (obsessive compulsive disorder)        Past Surgical History:    Past Surgical History:   Procedure Laterality Date    CATARACT REMOVAL         Social History:   Social History     Tobacco Use    Smoking status: Never Smoker    Smokeless tobacco: Never Used   Substance Use Topics    Alcohol use: Not on file       Family History:   No family history on file. Review of Systems:  Constitutional: negative for fevers or chills  Eyes: blind both eyes  ENT: negative for sore throat or nasal congestion,no dysphagia  Respiratory: neg for shortness of breath , cough  Cardiovascular: neg for chest pain , palpitations,pnd,negative for leg edema  Gastrointestinal: neg for for abd pain, nausea, vomiting, diarrhea or constipation,no gene,no blood in stool,  Appetite better  Genitourinary: negative for dysuria, urgency,hematuria or frequency  Integument/breast: negative for skin rash or lesions  Neurological: negative for unilateral weakness, numbness or tingling.   Muscular Skeletal: no joint pain   Psych :mood stable, keeps talking about going home    Objective:    Vitals: BP: 151/58 T:97.7 P:68 R:18 -----------------------------------------------------------------  Exam:    Verona King, not in any distress,   EYES:  Blind both eyes  ENT: Throat- no lesions, no neck nodes or sinus tenderness  NECK: normal, supple, no lymphadenopathy,  no carotid bruits  PULM: clear to auscultation bilaterally- no wheezes, rales or rhonchi, normal air movement, no respiratory distress  COR:   regular rate & rhythm, no murmurs and no gallops  ABD:    soft, non-tender, non-distended, normal bowel sounds, no masses or organomegaly  : deferred  EXT:no cyanosis, clubbing , edema ,  no calf tenderness, and warm to touch. NEURO: Motor and sensory grossly intact  PSYCH:  Mood stable  SKIN:   No skin lesions or rashes    -----------------------------------------------------------------  Diagnostic Data:   · All diagnostic data was reviewed    Assessment:        ICD-10-CM    1. Essential hypertension  I10    2. Dementia without behavioral disturbance, unspecified [facility] type ([facility]  F03.90    3. Delusional disorder, mixed type ([facility] [address]  F22    4. Stage 3 chronic kidney disease, unspecified whether stage 3a or 3b CKD ([facility])  N18.30      Patient Active Problem List   Diagnosis Code    Essential hypertension I10    Iron deficiency anemia D50.9    Anorexia R63.0    Cardiomegaly I51.7    Degenerative disease of nervous system ([address] G31.9    Exotropia H50.10    Unspecified dementia without behavioral disturbance ([address] F03.90    Delusional disorder, mixed type ([address] F22    Obsessive compulsive disorder F42.9    Legal blindness, as defined in [place] H54.8    Stage 3 chronic kidney disease ([facility] [address] N18.30    Depression F32. A    Anxiety disorder F41.9         Plan:       Monitor bp,  Monitor for behavioral changes  Continue current medications  Prevent pressure sore  Prevent falls    Electronically signed by Denzel Hinojosa MD on 1/13/2022 at 4:48 PM

## 2022-02-03 ENCOUNTER — OUTSIDE SERVICES (OUTPATIENT)
Dept: PRIMARY CARE CLINIC | Age: 87
End: 2022-02-03
Payer: MEDICARE

## 2022-02-03 DIAGNOSIS — I10 ESSENTIAL HYPERTENSION: Primary | ICD-10-CM

## 2022-02-03 DIAGNOSIS — H54.8 LEGAL BLINDNESS, AS DEFINED IN USA: ICD-10-CM

## 2022-02-03 DIAGNOSIS — F03.90 DEMENTIA WITHOUT BEHAVIORAL DISTURBANCE, UNSPECIFIED DEMENTIA TYPE: ICD-10-CM

## 2022-02-03 DIAGNOSIS — N18.30 STAGE 3 CHRONIC KIDNEY DISEASE, UNSPECIFIED WHETHER STAGE 3A OR 3B CKD (HCC): ICD-10-CM

## 2022-02-03 PROCEDURE — 99308 SBSQ NF CARE LOW MDM 20: CPT | Performed by: FAMILY MEDICINE

## 2022-02-03 NOTE — PROGRESS NOTES
Patient:  Sloane Young, 1935  I saw this patient on 02/03/2022, at [facility]   Mood stable,wants to go home  Denies any pain  She feels well         Reason for Visit:      ICD-10-CM    1. Essential hypertension  I10    2. Dementia without behavioral disturbance, unspecified dementia type ([facility] [address]  F03.90    3. Stage 3 chronic kidney disease, unspecified whether stage 3a or 3b CKD (HCC)  N18.30    4. Legal blindness, as defined in [place]  H54.8        Changes since last visit:   BP better  Mood stable  Denies pain  1. Fall:  none  2. Behavioral Change: mood stable  3. Pain Control: no issues  4. Mobility: no change  5. Pressure Sore:  none  Allergies:  Patient has no known allergies. Current Outpatient Medications   Medication Sig Dispense Refill    brimonidine (ALPHAGAN) 0.2 % ophthalmic solution       hydrocortisone 2.5 % cream Apply topically 3 times daily as needed Apply to affected area topically as needed for bee sting to right inner elbow TID      omeprazole 20 MG EC tablet Take 20 mg by mouth 2 times daily      losartan (COZAAR) 50 MG tablet Take 1 tablet by mouth daily 90 tablet 0    LORazepam (ATIVAN) 0.5 MG tablet Take 0.5 mg by mouth 2 times daily.        hyoscyamine (LEVSIN) 125 MCG/ML solution Take 0.125 mg by mouth every 4 hours as needed      promethazine (PHENERGAN) 25 MG tablet Take 25 mg by mouth every 6 hours as needed for Nausea      amitriptyline (ELAVIL) 50 MG tablet Take 50 mg by mouth nightly      aluminum & magnesium hydroxide-simethicone (MAALOX) 200-200-20 MG/5ML SUSP suspension Take 30 mLs by mouth every 4 hours as needed for Indigestion       bisacodyl (DULCOLAX) 10 MG suppository Place 10 mg rectally daily as needed for Constipation      dextromethorphan (DELSYM) 30 MG/5ML extended release liquid Take 30 mg by mouth 2 times daily as needed for Cough      Sodium Phosphates (FLEET) 7-19 GM/118ML Place 1 enema rectally once as needed      latanoprost (XALATAN) 0.005 % ophthalmic solution Place 1 drop into the right eye nightly      magnesium hydroxide (MILK OF MAGNESIA CONCENTRATE) 2400 MG/10ML SUSP Take 2,400 mg by mouth daily as needed      polyethylene glycol (GLYCOLAX) powder Take 17 g by mouth daily      acetaminophen 650 MG TABS Take 650 mg by mouth every 4 hours as needed. (Patient taking differently: Take 650 mg by mouth every 4 hours as needed 3 times a day for pain & every 4 hours PRN) 120 tablet 3     No current facility-administered medications for this visit. Past Medical History:    Past Medical History:   Diagnosis Date    Anemia     Anorexia     Anxiety disorder 1/27/2020    Blind     Cardiomegaly     Chronic kidney disease     Dementia ([facility] [address]     Depression     Hypertension     OCD (obsessive compulsive disorder)        Past Surgical History:    Past Surgical History:   Procedure Laterality Date    CATARACT REMOVAL         Social History:   Social History     Tobacco Use    Smoking status: Never Smoker    Smokeless tobacco: Never Used   Substance Use Topics    Alcohol use: Not on file       Family History:   No family history on file. Review of Systems:  Constitutional: negative for fevers or chills  Eyes: blind both eyes  ENT: negative for sore throat or nasal congestion,no dysphagia  Respiratory: neg for shortness of breath , cough  Cardiovascular: neg for chest pain , palpitations,pnd,negative for leg edema  Gastrointestinal: neg for for abd pain, nausea, vomiting, diarrhea or constipation,no gene,no blood in stool,  Appetite better  Genitourinary: negative for dysuria, urgency,hematuria or frequency  Integument/breast: negative for skin rash or lesions  Neurological: negative for unilateral weakness, numbness or tingling.   Muscular Skeletal: no joint pain   Psych :mood stable, keeps talking about going home    Objective:    Vitals: BP: 103/50 T:97.5 P:69 R:18 -----------------------------------------------------------------  Exam:    Patric Colder, not in any distress,   EYES:  Blind both eyes  ENT: Throat- no lesions, no neck nodes or sinus tenderness  NECK: normal, supple, no lymphadenopathy,  no carotid bruits  PULM: clear to auscultation bilaterally- no wheezes, rales or rhonchi, normal air movement, no respiratory distress  COR:   regular rate & rhythm, no murmurs and no gallops  ABD:    soft, non-tender, non-distended, normal bowel sounds, no masses or organomegaly  : deferred  EXT:no cyanosis, clubbing , edema ,  no calf tenderness, and warm to touch. NEURO: Motor and sensory grossly intact  PSYCH:  Mood stable  SKIN:   No skin lesions or rashes    -----------------------------------------------------------------  Diagnostic Data:   · All diagnostic data was reviewed    Assessment:        ICD-10-CM    1. Essential hypertension  I10    2. Dementia without behavioral disturbance, unspecified [facility] type ([facility]  F03.90    3. Stage 3 chronic kidney disease, unspecified whether stage 3a or 3b CKD (HCC)  N18.30    4. Legal blindness, as defined in [place]  H54.8      Patient Active Problem List   Diagnosis Code    Essential hypertension I10    Iron deficiency anemia D50.9    Anorexia R63.0    Cardiomegaly I51.7    Degenerative disease of nervous system ([facility] [address] G31.9    Exotropia H50.10    Unspecified dementia without behavioral disturbance ([facility] F03.90    Delusional disorder, mixed type ([facility] F22    Obsessive compulsive disorder F42.9    Legal blindness, as defined in [place] H54.8    Stage 3 chronic kidney disease ([facility] N18.30    Depression F32. A    Anxiety disorder F41.9         Plan:       Monitor bp  Monitor for behavioral changes  Continue current medications  Prevent pressure sore  Prevent falls    Electronically signed by Alondra Dupont MD on 2/3/2022 at 4:30 PM

## 2022-03-09 ENCOUNTER — OUTSIDE SERVICES (OUTPATIENT)
Dept: PRIMARY CARE CLINIC | Age: 87
End: 2022-03-09
Payer: MEDICARE

## 2022-03-09 DIAGNOSIS — F22 DELUSIONAL DISORDER, MIXED TYPE (HCC): ICD-10-CM

## 2022-03-09 DIAGNOSIS — F03.90 DEMENTIA WITHOUT BEHAVIORAL DISTURBANCE, UNSPECIFIED DEMENTIA TYPE: ICD-10-CM

## 2022-03-09 DIAGNOSIS — I10 ESSENTIAL HYPERTENSION: Primary | ICD-10-CM

## 2022-03-09 DIAGNOSIS — N18.30 STAGE 3 CHRONIC KIDNEY DISEASE, UNSPECIFIED WHETHER STAGE 3A OR 3B CKD (HCC): ICD-10-CM

## 2022-03-09 PROCEDURE — 99308 SBSQ NF CARE LOW MDM 20: CPT | Performed by: FAMILY MEDICINE

## 2022-03-09 NOTE — PROGRESS NOTES
Patient:  Christian Zhou, 1935  I saw this patient on 03/09/2022, at [facility]   Mood stable,wants to go home  bp stable  Denies any pain  She feels well         Reason for Visit:      ICD-10-CM    1. Essential hypertension  I10    2. Dementia without behavioral disturbance, unspecified dementia type ([facility]  F03.90    3. Stage 3 chronic kidney disease, unspecified whether stage 3a or 3b CKD (HCC)  N18.30    4. Delusional disorder, mixed type ([facility]  F22        Changes since last visit:   BP better  Mood stable  Denies pain  1. Fall:  none  2. Behavioral Change: mood stable  3. Pain Control: no issues  4. Mobility: no change  5. Pressure Sore:  none  Allergies:  Patient has no known allergies. Current Outpatient Medications   Medication Sig Dispense Refill    brimonidine (ALPHAGAN) 0.2 % ophthalmic solution       hydrocortisone 2.5 % cream Apply topically 3 times daily as needed Apply to affected area topically as needed for bee sting to right inner elbow TID      omeprazole 20 MG EC tablet Take 20 mg by mouth 2 times daily      losartan (COZAAR) 50 MG tablet Take 1 tablet by mouth daily 90 tablet 0    LORazepam (ATIVAN) 0.5 MG tablet Take 0.5 mg by mouth 2 times daily.        hyoscyamine (LEVSIN) 125 MCG/ML solution Take 0.125 mg by mouth every 4 hours as needed      promethazine (PHENERGAN) 25 MG tablet Take 25 mg by mouth every 6 hours as needed for Nausea      aluminum & magnesium hydroxide-simethicone (MAALOX) 200-200-20 MG/5ML SUSP suspension Take 30 mLs by mouth every 6 hours as needed for Indigestion Every 4-6 hours PRN      bisacodyl (DULCOLAX) 10 MG suppository Place 10 mg rectally daily as needed for Constipation      dextromethorphan (DELSYM) 30 MG/5ML extended release liquid Take 30 mg by mouth 2 times daily as needed for Cough      Sodium Phosphates (FLEET) 7-19 GM/118ML Place 1 enema rectally once as needed      latanoprost (XALATAN) 0.005 % ophthalmic solution Place 1 drop into the right eye nightly      magnesium hydroxide (MILK OF MAGNESIA CONCENTRATE) 2400 MG/10ML SUSP Take 2,400 mg by mouth daily as needed      polyethylene glycol (GLYCOLAX) powder Take 17 g by mouth daily      acetaminophen 650 MG TABS Take 650 mg by mouth every 4 hours as needed. (Patient taking differently: Take 650 mg by mouth every 4 hours as needed 3 times a day for pain & every 4 hours PRN) 120 tablet 3     No current facility-administered medications for this visit. Past Medical History:    Past Medical History:   Diagnosis Date    Anemia     Anorexia     Anxiety disorder 1/27/2020    Blind     Cardiomegaly     Chronic kidney disease     Dementia ([facility] [address]     Depression     Hypertension     OCD (obsessive compulsive disorder)        Past Surgical History:    Past Surgical History:   Procedure Laterality Date    CATARACT REMOVAL         Social History:   Social History     Tobacco Use    Smoking status: Never Smoker    Smokeless tobacco: Never Used   Substance Use Topics    Alcohol use: Not on file       Family History:   No family history on file. Review of Systems:  Constitutional: negative for fevers or chills  Eyes: blind both eyes  ENT: negative for sore throat or nasal congestion,no dysphagia  Respiratory: neg for shortness of breath , cough  Cardiovascular: neg for chest pain , palpitations,pnd,negative for leg edema  Gastrointestinal: neg for for abd pain, nausea, vomiting, diarrhea or constipation,no gene,no blood in stool,  Appetite better  Genitourinary: negative for dysuria, urgency,hematuria or frequency  Integument/breast: negative for skin rash or lesions  Neurological: negative for unilateral weakness, numbness or tingling.   Muscular Skeletal: no joint pain   Psych :mood stable, keeps talking about going home    Objective:    Vitals: BP: 126/54 T:97.3 P:68 R:16   -----------------------------------------------------------------  Exam:    GEN:   Awake, not in any distress, EYES:  Blind both eyes  ENT: Throat- no lesions, no neck nodes or sinus tenderness  NECK: normal, supple, no lymphadenopathy,  no carotid bruits  PULM: clear to auscultation bilaterally- no wheezes, rales or rhonchi, normal air movement, no respiratory distress  COR:   regular rate & rhythm, no murmurs and no gallops  ABD:    soft, non-tender, non-distended, normal bowel sounds, no masses or organomegaly  : deferred  EXT:no cyanosis, clubbing , edema ,  no calf tenderness, and warm to touch. NEURO: Motor and sensory grossly intact  PSYCH:  Mood stable  SKIN:   No skin lesions or rashes    -----------------------------------------------------------------  Diagnostic Data:   · All diagnostic data was reviewed    Assessment:        ICD-10-CM    1. Essential hypertension  I10    2. Dementia without behavioral disturbance, unspecified dementia type ([address]  F03.90    3. Stage 3 chronic kidney disease, unspecified whether stage 3a or 3b CKD (HCC)  N18.30    4. Delusional disorder, mixed type ([address]  [address] [facility]      Patient Active Problem List   Diagnosis Code    Essential hypertension I10    Iron deficiency anemia D50.9    Anorexia R63.0    Cardiomegaly I51.7    Degenerative disease of nervous system ([address] G31.9    Exotropia H50.10    Unspecified dementia without behavioral disturbance ([address] F03.90    Delusional disorder, mixed type ([address] F22    Obsessive compulsive disorder F42.9    Legal blindness, as defined in [place] H54.8    Stage 3 chronic kidney disease ([address] N18.30    Depression F32. A    Anxiety disorder F41.9         Plan:       Monitor bp  Monitor for behavioral changes  Continue current medications  Prevent pressure sore  Prevent falls    Electronically signed by Eduardo Hilario MD on 3/9/2022 at 4:42 PM

## 2022-04-08 ENCOUNTER — OUTSIDE SERVICES (OUTPATIENT)
Dept: PRIMARY CARE CLINIC | Age: 87
End: 2022-04-08
Payer: MEDICARE

## 2022-04-08 DIAGNOSIS — F03.90 DEMENTIA WITHOUT BEHAVIORAL DISTURBANCE, UNSPECIFIED DEMENTIA TYPE: ICD-10-CM

## 2022-04-08 DIAGNOSIS — H54.8 LEGAL BLINDNESS, AS DEFINED IN USA: ICD-10-CM

## 2022-04-08 DIAGNOSIS — I10 ESSENTIAL HYPERTENSION: Primary | ICD-10-CM

## 2022-04-08 DIAGNOSIS — F22 DELUSIONAL DISORDER, MIXED TYPE (HCC): ICD-10-CM

## 2022-04-08 DIAGNOSIS — N18.30 STAGE 3 CHRONIC KIDNEY DISEASE, UNSPECIFIED WHETHER STAGE 3A OR 3B CKD (HCC): ICD-10-CM

## 2022-04-08 RX ORDER — CLONAZEPAM 0.5 MG/1
0.5 TABLET ORAL 2 TIMES DAILY PRN
COMMUNITY
End: 2022-07-05

## 2022-04-08 NOTE — PROGRESS NOTES
Patient:  Mely Jansen, 1935  I saw this patient on 04/13/2022, [facility] about going [facility]  bp stable  Denies any pain  She feels well         Reason for Visit:      ICD-10-CM    1. Essential hypertension  I10    2. Stage 3 chronic kidney disease, unspecified whether stage 3a or 3b CKD (HCC)  N18.30    3. Dementia without behavioral disturbance, unspecified dementia type ([facility]  F03.90    4. Legal blindness, as defined in [place]  H54.8    5. Delusional disorder, mixed type ([facility]  F22        Changes since last visit:   BP stable  Mood stable  Denies pain  1. Fall:  none  2. Behavioral Change: mood stable  3. Pain Control: no issues  4. Mobility: no change  5. Pressure Sore:  none  Allergies:  Patient has no known allergies. Current Outpatient Medications   Medication Sig Dispense Refill    clonazePAM (KLONOPIN) 0.5 MG tablet Take 0.5 mg by mouth 2 times daily as needed.       brimonidine (ALPHAGAN) 0.2 % ophthalmic solution       hydrocortisone 2.5 % cream Apply topically 3 times daily as needed Apply to affected area topically as needed for bee sting to right inner elbow TID      omeprazole 20 MG EC tablet Take 20 mg by mouth 2 times daily      losartan (COZAAR) 50 MG tablet Take 1 tablet by mouth daily 90 tablet 0    hyoscyamine (LEVSIN) 125 MCG/ML solution Take 0.125 mg by mouth every 4 hours as needed      promethazine (PHENERGAN) 25 MG tablet Take 25 mg by mouth every 6 hours as needed for Nausea      aluminum & magnesium hydroxide-simethicone (MAALOX) 200-200-20 MG/5ML SUSP suspension Take 30 mLs by mouth every 6 hours as needed for Indigestion Every 4-6 hours PRN      bisacodyl (DULCOLAX) 10 MG suppository Place 10 mg rectally daily as needed for Constipation      dextromethorphan (DELSYM) 30 MG/5ML extended release liquid Take 30 mg by mouth 2 times daily as needed for Cough      Sodium Phosphates (FLEET) 7-19 GM/118ML Place 1 enema rectally once as needed      latanoprost (XALATAN) 0.005 % ophthalmic solution Place 1 drop into the right eye nightly      magnesium hydroxide (MILK OF MAGNESIA CONCENTRATE) 2400 MG/10ML SUSP Take 2,400 mg by mouth daily as needed      polyethylene glycol (GLYCOLAX) powder Take 17 g by mouth daily      acetaminophen 650 MG TABS Take 650 mg by mouth every 4 hours as needed. (Patient taking differently: Take 650 mg by mouth every 4 hours as needed 3 times a day for pain & every 4 hours PRN) 120 tablet 3     No current facility-administered medications for this visit. Past Medical History:    Past Medical History:   Diagnosis Date    Anemia     Anorexia     Anxiety disorder 1/27/2020    Blind     Cardiomegaly     Chronic kidney disease     Dementia ([facility]     Depression     Hypertension     OCD (obsessive compulsive disorder)        Past Surgical History:    Past Surgical History:   Procedure Laterality Date    CATARACT REMOVAL         Social History:   Social History     Tobacco Use    Smoking status: Never Smoker    Smokeless tobacco: Never Used   Substance Use Topics    Alcohol use: Not on file       Family History:   No family history on file. Review of Systems:  Constitutional: negative for fevers or chills  Eyes: blind both eyes  ENT: negative for sore throat or nasal congestion,no dysphagia  Respiratory: neg for shortness of breath , cough  Cardiovascular: neg for chest pain , palpitations,pnd,negative for leg edema  Gastrointestinal: neg for for abd pain, nausea, vomiting, diarrhea or constipation,no gene,no blood in stool,  Appetite better  Genitourinary: negative for dysuria, urgency,hematuria or frequency  Integument/breast: negative for skin rash or lesions  Neurological: negative for unilateral weakness, numbness or tingling.   Muscular Skeletal: no joint pain   Psych :mood stable, keeps talking about going home    Objective:    Vitals: BP: 95/47 T:97.3 P:63 R:18 -----------------------------------------------------------------  Exam:    Jose J Bunch, not in any distress,   EYES:  Blind both eyes  ENT: Throat- no lesions, no neck nodes or sinus tenderness  NECK: normal, supple, no lymphadenopathy,  no carotid bruits  PULM: clear to auscultation bilaterally- no wheezes, rales or rhonchi, normal air movement, no respiratory distress  COR:   regular rate & rhythm, no murmurs and no gallops  ABD:    soft, non-tender, non-distended, normal bowel sounds, no masses or organomegaly  : deferred  EXT:no cyanosis, clubbing , edema ,  no calf tenderness, and warm to touch. NEURO: Motor and sensory grossly intact  PSYCH:  Mood stable  SKIN:   No skin lesions or rashes    -----------------------------------------------------------------  Diagnostic Data:   · All diagnostic data was reviewed    Assessment:        ICD-10-CM    1. Essential hypertension  I10    2. Stage 3 chronic kidney disease, unspecified whether stage 3a or 3b CKD (HCC)  N18.30    3. Dementia without behavioral disturbance, unspecified dementia type ([facility]  F03.90    4. Legal blindness, as defined in [place]  H54.8    5. Delusional disorder, mixed type ([facility]  [address] [facility]      Patient Active Problem List   Diagnosis Code    Essential hypertension I10    Iron deficiency anemia D50.9    Anorexia R63.0    Cardiomegaly I51.7    Degenerative disease of nervous system ([facility] G31.9    Exotropia H50.10    Unspecified dementia without behavioral disturbance ([facility] F03.90    Delusional disorder, mixed type ([facility] F22    Obsessive compulsive disorder F42.9    Legal blindness, as defined in [place] H54.8    Stage 3 chronic kidney disease ([address] N18.30    Depression F32. A    Anxiety disorder F41.9         Plan:       Monitor bp  Monitor for behavioral changes  Continue current medications  Prevent pressure sore  Prevent falls    Electronically signed by Shari [address], MD on 4/14/2022 at 3:07 PM

## 2022-04-13 PROCEDURE — 99308 SBSQ NF CARE LOW MDM 20: CPT | Performed by: FAMILY MEDICINE

## 2022-05-04 ENCOUNTER — HOSPITAL ENCOUNTER (OUTPATIENT)
Age: 87
Setting detail: SPECIMEN
Discharge: HOME | End: 2022-05-04
Payer: MEDICARE

## 2022-05-04 LAB
ALBUMIN SERPL-MCNC: 3.6 G/DL (ref 3.5–5.2)
ALBUMIN/GLOBULIN RATIO: 1.5 (ref 1–2.5)
ALP BLD-CCNC: 55 U/L (ref 35–104)
ALT SERPL-CCNC: 9 U/L (ref 5–33)
ANION GAP SERPL CALCULATED.3IONS-SCNC: 7 MMOL/L (ref 9–17)
AST SERPL-CCNC: 17 U/L
BILIRUB SERPL-MCNC: 0.23 MG/DL (ref 0.3–1.2)
BUN BLDV-MCNC: 37 MG/DL (ref 8–23)
BUN/CREAT BLD: 29 (ref 9–20)
CALCIUM SERPL-MCNC: 9.4 MG/DL (ref 8.6–10.4)
CHLORIDE BLD-SCNC: 107 MMOL/L (ref 98–107)
CHOLESTEROL/HDL RATIO: 3.8
CHOLESTEROL: 202 MG/DL
CO2: 25 MMOL/L (ref 20–31)
CREAT SERPL-MCNC: 1.26 MG/DL (ref 0.5–0.9)
GFR AFRICAN AMERICAN: 49 ML/MIN
GFR NON-AFRICAN AMERICAN: 40 ML/MIN
GFR SERPL CREATININE-BSD FRML MDRD: ABNORMAL ML/MIN/{1.73_M2}
GFR SERPL CREATININE-BSD FRML MDRD: ABNORMAL ML/MIN/{1.73_M2}
GLUCOSE BLD-MCNC: 82 MG/DL (ref 70–99)
HDLC SERPL-MCNC: 53 MG/DL
LDL CHOLESTEROL: 130 MG/DL (ref 0–130)
POTASSIUM SERPL-SCNC: 5.1 MMOL/L (ref 3.7–5.3)
SODIUM BLD-SCNC: 139 MMOL/L (ref 135–144)
TOTAL PROTEIN: 6 G/DL (ref 6.4–8.3)
TRIGL SERPL-MCNC: 96 MG/DL
TSH SERPL DL<=0.05 MIU/L-ACNC: 2.31 UIU/ML (ref 0.3–5)

## 2022-05-04 PROCEDURE — 80061 LIPID PANEL: CPT

## 2022-05-04 PROCEDURE — 36415 COLL VENOUS BLD VENIPUNCTURE: CPT

## 2022-05-04 PROCEDURE — 84443 ASSAY THYROID STIM HORMONE: CPT

## 2022-05-04 PROCEDURE — P9603 ONE-WAY ALLOW PRORATED MILES: HCPCS

## 2022-05-04 PROCEDURE — 80053 COMPREHEN METABOLIC PANEL: CPT

## 2022-05-17 ENCOUNTER — OUTSIDE SERVICES (OUTPATIENT)
Dept: PRIMARY CARE CLINIC | Age: 87
End: 2022-05-17
Payer: MEDICARE

## 2022-05-17 DIAGNOSIS — I10 ESSENTIAL HYPERTENSION: Primary | ICD-10-CM

## 2022-05-17 DIAGNOSIS — F03.90 DEMENTIA WITHOUT BEHAVIORAL DISTURBANCE, UNSPECIFIED DEMENTIA TYPE: ICD-10-CM

## 2022-05-17 DIAGNOSIS — N18.30 STAGE 3 CHRONIC KIDNEY DISEASE, UNSPECIFIED WHETHER STAGE 3A OR 3B CKD (HCC): ICD-10-CM

## 2022-05-17 NOTE — PROGRESS NOTES
Patient:  Bre Del Rio, 1935  I saw this patient on 05/18/2022, at [address] significant issues  bp stable  Denies any pain  She feels well         Reason for Visit:      ICD-10-CM    1. Essential hypertension  I10    2. Stage 3 chronic kidney disease, unspecified whether stage 3a or 3b CKD (HCC)  N18.30    3. Dementia without behavioral disturbance, unspecified dementia type ([facility]  F03.90        Changes since last visit:   BP stable  Mood stable  Denies pain  1. Fall:  none  2. Behavioral Change: mood stable  3. Pain Control: no issues  4. Mobility: no change  5. Pressure Sore:  none  Allergies:  Patient has no known allergies. Current Outpatient Medications   Medication Sig Dispense Refill    clonazePAM (KLONOPIN) 0.5 MG tablet Take 0.5 mg by mouth 2 times daily as needed.       brimonidine (ALPHAGAN) 0.2 % ophthalmic solution       hydrocortisone 2.5 % cream Apply topically 3 times daily as needed Apply to affected area topically as needed for bee sting to right inner elbow TID      omeprazole 20 MG EC tablet Take 20 mg by mouth 2 times daily      losartan (COZAAR) 50 MG tablet Take 1 tablet by mouth daily 90 tablet 0    hyoscyamine (LEVSIN) 125 MCG/ML solution Take 0.125 mg by mouth every 4 hours as needed      promethazine (PHENERGAN) 25 MG tablet Take 25 mg by mouth every 6 hours as needed for Nausea      aluminum & magnesium hydroxide-simethicone (MAALOX) 200-200-20 MG/5ML SUSP suspension Take 30 mLs by mouth every 6 hours as needed for Indigestion Every 4-6 hours PRN      bisacodyl (DULCOLAX) 10 MG suppository Place 10 mg rectally daily as needed for Constipation      dextromethorphan (DELSYM) 30 MG/5ML extended release liquid Take 30 mg by mouth 2 times daily as needed for Cough      Sodium Phosphates (FLEET) 7-19 GM/118ML Place 1 enema rectally once as needed      latanoprost (XALATAN) 0.005 % ophthalmic solution Place 1 drop into the right eye nightly      magnesium hydroxide (MILK OF MAGNESIA CONCENTRATE) 2400 MG/10ML SUSP Take 2,400 mg by mouth daily as needed      polyethylene glycol (GLYCOLAX) powder Take 17 g by mouth daily      acetaminophen 650 MG TABS Take 650 mg by mouth every 4 hours as needed. (Patient taking differently: Take 650 mg by mouth every 4 hours as needed 3 times a day for pain & every 4 hours PRN) 120 tablet 3     No current facility-administered medications for this visit. Past Medical History:    Past Medical History:   Diagnosis Date    Anemia     Anorexia     Anxiety disorder 1/27/2020    Blind     Cardiomegaly     Chronic kidney disease     Dementia ([facility]     Depression     Hypertension     OCD (obsessive compulsive disorder)        Past Surgical History:    Past Surgical History:   Procedure Laterality Date    CATARACT REMOVAL         Social History:   Social History     Tobacco Use    Smoking status: Never Smoker    Smokeless tobacco: Never Used   Substance Use Topics    Alcohol use: Not on file       Family History:   No family history on file. Review of Systems:  Constitutional: negative for fevers or chills  Eyes: blind both eyes  ENT: negative for sore throat or nasal congestion,no dysphagia  Respiratory: neg for shortness of breath , cough  Cardiovascular: neg for chest pain , palpitations,pnd,negative for leg edema  Gastrointestinal: neg for for abd pain, nausea, vomiting, diarrhea or constipation,no gene,no blood in stool,  Appetite better  Genitourinary: negative for dysuria, urgency,hematuria or frequency  Integument/breast: negative for skin rash or lesions  Neurological: negative for unilateral weakness, numbness or tingling.   Muscular Skeletal: no joint pain   Psych :mood stable, keeps talking about going home,no agitation    Objective:    Vitals: BP: 115/55 T:97.8 P:69 R:18   -----------------------------------------------------------------  Exam:    GEN:   Awake, not in any distress,   EYES:  Blind both eyes  ENT: Throat- no lesions, no neck nodes or sinus tenderness  NECK: normal, supple, no lymphadenopathy,  no carotid bruits  PULM: clear to auscultation bilaterally- no wheezes, rales or rhonchi, normal air movement, no respiratory distress  COR:   regular rate & rhythm, no murmurs and no gallops  ABD:    soft, non-tender, non-distended, normal bowel sounds, no masses or organomegaly  : deferred  EXT:no cyanosis, clubbing , edema ,  no calf tenderness, and warm to touch. NEURO: Motor and sensory grossly intact  PSYCH:  Mood stable ,judgement impaired  SKIN:   No skin lesions or rashes    -----------------------------------------------------------------  Diagnostic Data:   · All diagnostic data was reviewed    Assessment:        ICD-10-CM    1. Essential hypertension  I10    2. Stage 3 chronic kidney disease, unspecified whether stage 3a or 3b CKD (HCC)  N18.30    3. Dementia without behavioral disturbance, unspecified dementia type ([facility]  F03.90      Patient Active Problem List   Diagnosis Code    Essential hypertension I10    Iron deficiency anemia D50.9    Anorexia R63.0    Cardiomegaly I51.7    Degenerative disease of nervous system ([address] G31.9    Exotropia H50.10    Unspecified dementia without behavioral disturbance ([address] F03.90    Delusional disorder, mixed type ([address] F22    Obsessive compulsive disorder F42.9    Legal blindness, as defined in [place] H54.8    Stage 3 chronic kidney disease ([facility] N18.30    Depression F32. A    Anxiety disorder F41.9         Plan:       Monitor bp  Monitor for behavioral changes  Continue current medications  Prevent pressure sore  Prevent falls    Electronically signed by Daniel Sheldon MD on 5/19/2022 at 7:54 AM

## 2022-05-18 PROCEDURE — 99308 SBSQ NF CARE LOW MDM 20: CPT | Performed by: FAMILY MEDICINE

## 2022-05-28 ENCOUNTER — HOSPITAL ENCOUNTER (OUTPATIENT)
Age: 87
Setting detail: SPECIMEN
Discharge: HOME | End: 2022-05-28

## 2022-05-28 ENCOUNTER — HOSPITAL ENCOUNTER (OUTPATIENT)
Age: 87
Setting detail: SPECIMEN
Discharge: HOME | End: 2022-05-28
Payer: MEDICARE

## 2022-05-28 LAB
-: ABNORMAL
BACTERIA: ABNORMAL
BILIRUBIN URINE: NEGATIVE
COLOR: YELLOW
EPITHELIAL CELLS UA: ABNORMAL /HPF (ref 0–25)
GLUCOSE URINE: NEGATIVE
KETONES, URINE: NEGATIVE
LEUKOCYTE ESTERASE, URINE: ABNORMAL
NITRITE, URINE: POSITIVE
PH UA: 7 (ref 5–9)
PROTEIN UA: ABNORMAL
RBC UA: ABNORMAL /HPF (ref 0–2)
SPECIFIC GRAVITY UA: 1.01 (ref 1.01–1.02)
TURBIDITY: ABNORMAL
URINE HGB: ABNORMAL
UROBILINOGEN, URINE: NORMAL
WBC UA: ABNORMAL /HPF (ref 0–5)

## 2022-05-28 PROCEDURE — 87186 SC STD MICRODIL/AGAR DIL: CPT

## 2022-05-28 PROCEDURE — 87077 CULTURE AEROBIC IDENTIFY: CPT

## 2022-05-28 PROCEDURE — 81001 URINALYSIS AUTO W/SCOPE: CPT

## 2022-05-28 PROCEDURE — 87086 URINE CULTURE/COLONY COUNT: CPT

## 2022-05-29 LAB
CULTURE: ABNORMAL
SPECIMEN DESCRIPTION: ABNORMAL

## 2022-06-08 ENCOUNTER — OUTSIDE SERVICES (OUTPATIENT)
Dept: PRIMARY CARE CLINIC | Age: 87
End: 2022-06-08
Payer: MEDICARE

## 2022-06-08 DIAGNOSIS — H54.8 LEGAL BLINDNESS, AS DEFINED IN USA: ICD-10-CM

## 2022-06-08 DIAGNOSIS — N18.30 STAGE 3 CHRONIC KIDNEY DISEASE, UNSPECIFIED WHETHER STAGE 3A OR 3B CKD (HCC): ICD-10-CM

## 2022-06-08 DIAGNOSIS — I10 ESSENTIAL HYPERTENSION: Primary | ICD-10-CM

## 2022-06-08 DIAGNOSIS — F03.90 DEMENTIA WITHOUT BEHAVIORAL DISTURBANCE, UNSPECIFIED DEMENTIA TYPE: ICD-10-CM

## 2022-06-08 PROCEDURE — 99308 SBSQ NF CARE LOW MDM 20: CPT | Performed by: FAMILY MEDICINE

## 2022-06-08 NOTE — PROGRESS NOTES
the right eye nightly      magnesium hydroxide (MILK OF MAGNESIA CONCENTRATE) 2400 MG/10ML SUSP Take 2,400 mg by mouth daily as needed      polyethylene glycol (GLYCOLAX) powder Take 17 g by mouth daily      acetaminophen 650 MG TABS Take 650 mg by mouth every 4 hours as needed. (Patient taking differently: Take 650 mg by mouth every 4 hours as needed 3 times a day for pain & every 4 hours PRN) 120 tablet 3     No current facility-administered medications for this visit. Past Medical History:    Past Medical History:   Diagnosis Date    Anemia     Anorexia     Anxiety disorder 1/27/2020    Blind     Cardiomegaly     Chronic kidney disease     Dementia ([facility]     Depression     Hypertension     OCD (obsessive compulsive disorder)        Past Surgical History:    Past Surgical History:   Procedure Laterality Date    CATARACT REMOVAL         Social History:   Social History     Tobacco Use    Smoking status: Never Smoker    Smokeless tobacco: Never Used   Substance Use Topics    Alcohol use: Not on file       Family History:   No family history on file. Review of Systems:  Constitutional: negative for fevers or chills  Eyes: blind both eyes  ENT: negative for sore throat or nasal congestion,no dysphagia  Respiratory: neg for shortness of breath , cough  Cardiovascular: neg for chest pain , palpitations,pnd,negative for leg edema  Gastrointestinal: neg for for abd pain, nausea, vomiting, diarrhea or constipation,no gene,no blood in stool,  Appetite better  Genitourinary: negative for dysuria, urgency,hematuria or frequency  Integument/breast: negative for skin rash or lesions  Neurological: negative for unilateral weakness, numbness or tingling.   Muscular Skeletal: no joint pain   Psych :mood stable, keeps talking about going home,no agitation    Objective:    Vitals: BP: 112/57 T:97.3 P:66 R:16   -----------------------------------------------------------------  Exam:    GEN:   Awake, not in any distress,   EYES:  Blind both eyes  ENT: Throat- no lesions, no neck nodes or sinus tenderness  NECK: normal, supple, no lymphadenopathy,  no carotid bruits  PULM: clear to auscultation bilaterally- no wheezes, rales or rhonchi, normal air movement, no respiratory distress  COR:   regular rate & rhythm, no murmurs and no gallops  ABD:    soft, non-tender, non-distended, normal bowel sounds, no masses or organomegaly  : deferred  EXT:no cyanosis, clubbing , edema ,  no calf tenderness, and warm to touch. NEURO: Motor and sensory grossly intact  PSYCH:  Mood stable ,judgement impaired  SKIN:   No skin lesions or rashes    -----------------------------------------------------------------  Diagnostic Data:   · All diagnostic data was reviewed    Assessment:        ICD-10-CM    1. Essential hypertension  I10    2. Stage 3 chronic kidney disease, unspecified whether stage 3a or 3b CKD (HCC)  N18.30    3. Dementia without behavioral disturbance, unspecified dementia type ([facility]  F03.90    4. Legal blindness, as defined in [place]  H54.8      Patient Active Problem List   Diagnosis Code    Essential hypertension I10    Iron deficiency anemia D50.9    Anorexia R63.0    Cardiomegaly I51.7    Degenerative disease of nervous system ([address] G31.9    Exotropia H50.10    Unspecified dementia without behavioral disturbance ([address] F03.90    Delusional disorder, mixed type ([address] F22    Obsessive compulsive disorder F42.9    Legal blindness, as defined in [place] H54.8    Stage 3 chronic kidney disease ([address] N18.30    Depression F32. A    Anxiety disorder F41.9         Plan:       Monitor bp  Monitor for behavioral changes  Continue current medications  Prevent pressure sore  Prevent falls    Electronically signed by Mary Bailey MD on 6/9/2022 at 8:10 PM

## 2022-07-05 RX ORDER — MIRTAZAPINE 15 MG/1
15 TABLET, FILM COATED ORAL NIGHTLY
COMMUNITY

## 2022-07-05 RX ORDER — ESCITALOPRAM OXALATE 10 MG/1
10 TABLET ORAL DAILY
COMMUNITY

## 2022-07-12 ENCOUNTER — OUTSIDE SERVICES (OUTPATIENT)
Dept: PRIMARY CARE CLINIC | Age: 87
End: 2022-07-12
Payer: MEDICARE

## 2022-07-12 DIAGNOSIS — N18.30 STAGE 3 CHRONIC KIDNEY DISEASE, UNSPECIFIED WHETHER STAGE 3A OR 3B CKD (HCC): ICD-10-CM

## 2022-07-12 DIAGNOSIS — F22 DELUSIONAL DISORDER, MIXED TYPE (HCC): ICD-10-CM

## 2022-07-12 DIAGNOSIS — F03.90 DEMENTIA WITHOUT BEHAVIORAL DISTURBANCE, UNSPECIFIED DEMENTIA TYPE: ICD-10-CM

## 2022-07-12 DIAGNOSIS — H54.8 LEGAL BLINDNESS, AS DEFINED IN USA: ICD-10-CM

## 2022-07-12 DIAGNOSIS — I10 ESSENTIAL HYPERTENSION: Primary | ICD-10-CM

## 2022-07-12 NOTE — PROGRESS NOTES
Patient:  Phu Maya, 1935  I saw this patient on 07/13/2022, at [address] significant issues  Denies any pain  She feels well   Wants to go home      Reason for Visit:      ICD-10-CM    1. Essential hypertension  I10       2. Stage 3 chronic kidney disease, unspecified whether stage 3a or 3b CKD (HCC)  N18.30       3. Dementia without behavioral disturbance, unspecified dementia type ([facility]  F03.90       4. Legal blindness, as defined in [place]  H54.8       5. Delusional disorder, mixed type ([facility]  F22           Changes since last visit:   BP stable  Mood stable  Denies pain  1. Fall:  none  2. Behavioral Change: mood stable  3. Pain Control: no issues  4. Mobility: no change  5. Pressure Sore:  none  Allergies:  Patient has no known allergies.      Current Outpatient Medications   Medication Sig Dispense Refill    escitalopram (LEXAPRO) 10 MG tablet Take 10 mg by mouth daily Indications: Feeling Anxious      mirtazapine (REMERON) 15 MG tablet Take 15 mg by mouth nightly Indications: Depression      brimonidine (ALPHAGAN) 0.2 % ophthalmic solution       hydrocortisone 2.5 % cream Apply topically 3 times daily as needed Apply to affected area topically as needed for bee sting to right inner elbow TID      omeprazole 20 MG EC tablet Take 20 mg by mouth 2 times daily      losartan (COZAAR) 50 MG tablet Take 1 tablet by mouth daily 90 tablet 0    hyoscyamine (LEVSIN) 125 MCG/ML solution Take 0.125 mg by mouth every 4 hours as needed      promethazine (PHENERGAN) 25 MG tablet Take 25 mg by mouth every 6 hours as needed for Nausea      aluminum & magnesium hydroxide-simethicone (MAALOX) 200-200-20 MG/5ML SUSP suspension Take 30 mLs by mouth every 6 hours as needed for Indigestion Every 4-6 hours PRN      bisacodyl (DULCOLAX) 10 MG suppository Place 10 mg rectally daily as needed for Constipation      dextromethorphan (DELSYM) 30 MG/5ML extended release liquid Take 30 mg by mouth 2 times daily as needed for Cough      Sodium Phosphates (FLEET) 7-19 GM/118ML Place 1 enema rectally once as needed      latanoprost (XALATAN) 0.005 % ophthalmic solution Place 1 drop into the right eye nightly      magnesium hydroxide (MILK OF MAGNESIA CONCENTRATE) 2400 MG/10ML SUSP Take 2,400 mg by mouth daily as needed      polyethylene glycol (GLYCOLAX) powder Take 17 g by mouth daily      acetaminophen 650 MG TABS Take 650 mg by mouth every 4 hours as needed. (Patient taking differently: Take 650 mg by mouth every 4 hours as needed 3 times a day for pain & every 4 hours PRN) 120 tablet 3     No current facility-administered medications for this visit. Past Medical History:    Past Medical History:   Diagnosis Date    Anemia     Anorexia     Anxiety disorder 1/27/2020    Blind     Cardiomegaly     Chronic kidney disease     Dementia (HCC)     Depression     Hypertension     OCD (obsessive compulsive disorder)        Past Surgical History:    Past Surgical History:   Procedure Laterality Date    CATARACT REMOVAL         Social History:   Social History     Tobacco Use    Smoking status: Never    Smokeless tobacco: Never   Substance Use Topics    Alcohol use: Not on file       Family History:   No family history on file. Review of Systems:  Constitutional: negative for fevers or chills  Eyes: blind both eyes  ENT: negative for sore throat or nasal congestion,no dysphagia  Respiratory: neg for shortness of breath , cough  Cardiovascular: neg for chest pain , palpitations,pnd,negative for leg edema  Gastrointestinal: neg for for abd pain, nausea, vomiting, diarrhea or constipation,no gene,no blood in stool,  Appetite better  Genitourinary: negative for dysuria, urgency,hematuria or frequency  Integument/breast: negative for skin rash or lesions  Neurological: negative for unilateral weakness, numbness or tingling.   Muscular Skeletal: no joint pain   Psych :mood stable, keeps talking about going home,no agitation    Objective:    Vitals: BP: 112/60 T:97.0 P:71 R:16   -----------------------------------------------------------------  Exam:    GEN:   Awake, not in any distress,   EYES:  Blind both eyes  ENT: Throat- no lesions, no neck nodes or sinus tenderness  NECK: normal, supple, no lymphadenopathy,  no carotid bruits  PULM: clear to auscultation bilaterally- no wheezes, rales or rhonchi, normal air movement, no respiratory distress  COR:   regular rate & rhythm, no murmurs and no gallops  ABD:    soft, non-tender, non-distended, normal bowel sounds, no masses or organomegaly  : deferred  EXT:no cyanosis, clubbing , edema ,  no calf tenderness, and warm to touch. NEURO: Motor and sensory grossly intact  PSYCH:  Mood stable ,judgement impaired  SKIN:   No skin lesions or rashes    -----------------------------------------------------------------  Diagnostic Data:   All diagnostic data was reviewed    Assessment:        ICD-10-CM    1. Essential hypertension  I10       2. Stage 3 chronic kidney disease, unspecified whether stage 3a or 3b CKD (HCC)  N18.30       3. Dementia without behavioral disturbance, unspecified dementia type ([facility]  F03.90       4. Legal blindness, as defined in [place]  H54.8       5. Delusional disorder, mixed type ([facility]  [address] [facility]         Patient Active Problem List   Diagnosis Code    Essential hypertension I10    Iron deficiency anemia D50.9    Anorexia R63.0    Cardiomegaly I51.7    Degenerative disease of nervous system ([facility] G31.9    Exotropia H50.10    Unspecified dementia without behavioral disturbance ([facility] F03.90    Delusional disorder, mixed type ([facility] F22    Obsessive compulsive disorder F42.9    Legal blindness, as defined in [place] H54.8    Stage 3 chronic kidney disease ([facility] N18.30    Depression F32. A    Anxiety disorder F41.9         Plan:       Monitor bp  Monitor for behavioral changes  Continue current medications  Prevent pressure sore  Prevent falls    Electronically signed by Suzette Beverly MD on 7/15/2022 at 9:43 AM

## 2022-07-13 PROCEDURE — 99308 SBSQ NF CARE LOW MDM 20: CPT | Performed by: FAMILY MEDICINE

## 2022-08-05 ENCOUNTER — OUTSIDE SERVICES (OUTPATIENT)
Dept: PRIMARY CARE CLINIC | Age: 87
End: 2022-08-05
Payer: MEDICARE

## 2022-08-05 DIAGNOSIS — F03.90 DEMENTIA WITHOUT BEHAVIORAL DISTURBANCE, UNSPECIFIED DEMENTIA TYPE: ICD-10-CM

## 2022-08-05 DIAGNOSIS — H54.8 LEGAL BLINDNESS, AS DEFINED IN USA: ICD-10-CM

## 2022-08-05 DIAGNOSIS — I10 ESSENTIAL HYPERTENSION: Primary | ICD-10-CM

## 2022-08-05 DIAGNOSIS — N18.30 STAGE 3 CHRONIC KIDNEY DISEASE, UNSPECIFIED WHETHER STAGE 3A OR 3B CKD (HCC): ICD-10-CM

## 2022-08-05 NOTE — PROGRESS NOTES
Patient:  Awais Breen, 1935  I saw this patient on 08/10/2022, at [address] significant issues  Denies any pain  She feels well         Reason for Visit:      ICD-10-CM    1. Essential hypertension  I10       2. Stage 3 chronic kidney disease, unspecified whether stage 3a or 3b CKD (HCC)  N18.30       3. Dementia without behavioral disturbance, unspecified dementia type ([facility]  F03.90       4. Legal blindness, as defined in [place]  H54.8             Changes since last visit:   BP stable  Mood stable  Denies pain  1. Fall:  none  2. Behavioral Change: mood stable  3. Pain Control: no issues  4. Mobility: no change  5. Pressure Sore:  none  Allergies:  Patient has no known allergies.      Current Outpatient Medications   Medication Sig Dispense Refill    escitalopram (LEXAPRO) 10 MG tablet Take 10 mg by mouth daily Indications: Feeling Anxious      mirtazapine (REMERON) 15 MG tablet Take 15 mg by mouth nightly Indications: Depression      brimonidine (ALPHAGAN) 0.2 % ophthalmic solution       hydrocortisone 2.5 % cream Apply topically 3 times daily as needed Apply to affected area topically as needed for bee sting to right inner elbow TID      omeprazole 20 MG EC tablet Take 20 mg by mouth 2 times daily      losartan (COZAAR) 50 MG tablet Take 1 tablet by mouth daily 90 tablet 0    hyoscyamine (LEVSIN) 125 MCG/ML solution Take 0.125 mg by mouth every 4 hours as needed      promethazine (PHENERGAN) 25 MG tablet Take 25 mg by mouth every 6 hours as needed for Nausea      aluminum & magnesium hydroxide-simethicone (MAALOX) 200-200-20 MG/5ML SUSP suspension Take 30 mLs by mouth every 6 hours as needed for Indigestion Every 4-6 hours PRN      bisacodyl (DULCOLAX) 10 MG suppository Place 10 mg rectally daily as needed for Constipation      dextromethorphan (DELSYM) 30 MG/5ML extended release liquid Take 30 mg by mouth 2 times daily as needed for Cough      Sodium Phosphates (FLEET) 7-19 GM/118ML Place 1 enema rectally once as needed      latanoprost (XALATAN) 0.005 % ophthalmic solution Place 1 drop into the right eye nightly      magnesium hydroxide (MILK OF MAGNESIA CONCENTRATE) 2400 MG/10ML SUSP Take 2,400 mg by mouth daily as needed      polyethylene glycol (GLYCOLAX) powder Take 17 g by mouth daily      acetaminophen 650 MG TABS Take 650 mg by mouth every 4 hours as needed. (Patient taking differently: Take 650 mg by mouth every 4 hours as needed 3 times a day for pain & every 4 hours PRN) 120 tablet 3     No current facility-administered medications for this visit. Past Medical History:    Past Medical History:   Diagnosis Date    Anemia     Anorexia     Anxiety disorder 1/27/2020    Blind     Cardiomegaly     Chronic kidney disease     Dementia (HCC)     Depression     Hypertension     OCD (obsessive compulsive disorder)        Past Surgical History:    Past Surgical History:   Procedure Laterality Date    CATARACT REMOVAL         Social History:   Social History     Tobacco Use    Smoking status: Never    Smokeless tobacco: Never   Substance Use Topics    Alcohol use: Not on file       Family History:   No family history on file. Review of Systems:  Constitutional: negative for fevers or chills  Eyes: blind both eyes  ENT: negative for sore throat or nasal congestion,no dysphagia  Respiratory: neg for shortness of breath , cough  Cardiovascular: neg for chest pain , palpitations,pnd,negative for leg edema  Gastrointestinal: neg for for abd pain, nausea, vomiting, diarrhea or constipation,no gene,no blood in stool,  Appetite better  Genitourinary: negative for dysuria, urgency,hematuria or frequency  Integument/breast: negative for skin rash or lesions  Neurological: negative for unilateral weakness, numbness or tingling.   Muscular Skeletal: no joint pain   Psych :mood stable, keeps talking about going home,no agitation    Objective:    Vitals: BP: 144/64 T:97.3 P:64 R:18

## 2022-08-10 PROCEDURE — 99308 SBSQ NF CARE LOW MDM 20: CPT | Performed by: FAMILY MEDICINE

## 2022-09-13 ENCOUNTER — OUTSIDE SERVICES (OUTPATIENT)
Dept: PRIMARY CARE CLINIC | Age: 87
End: 2022-09-13
Payer: MEDICARE

## 2022-09-13 DIAGNOSIS — F22 DELUSIONAL DISORDER, MIXED TYPE (HCC): ICD-10-CM

## 2022-09-13 DIAGNOSIS — H54.8 LEGAL BLINDNESS, AS DEFINED IN USA: ICD-10-CM

## 2022-09-13 DIAGNOSIS — I10 ESSENTIAL HYPERTENSION: Primary | ICD-10-CM

## 2022-09-13 DIAGNOSIS — F03.90 DEMENTIA WITHOUT BEHAVIORAL DISTURBANCE, UNSPECIFIED DEMENTIA TYPE: ICD-10-CM

## 2022-09-13 DIAGNOSIS — N18.30 STAGE 3 CHRONIC KIDNEY DISEASE, UNSPECIFIED WHETHER STAGE 3A OR 3B CKD (HCC): ICD-10-CM

## 2022-09-13 NOTE — PROGRESS NOTES
Patient:  Nan Blanc, 1935  I saw this patient on 09/14/2022, at [address] significant issues  Denies any pain  She feels well         Reason for Visit:      ICD-10-CM    1. Essential hypertension  I10       2. Stage 3 chronic kidney disease, unspecified whether stage 3a or 3b CKD (HCC)  N18.30       3. Dementia without behavioral disturbance, unspecified dementia type ([facility]  F03.90       4. Legal blindness, as defined in [place]  H54.8       5. Delusional disorder, mixed type ([facility]  F22               Changes since last visit:   BP stable  Mood stable  Denies pain  1. Fall:  none  2. Behavioral Change: mood stable  3. Pain Control: no issues  4. Mobility: no change  5. Pressure Sore:  none  Allergies:  Patient has no known allergies. Current Outpatient Medications   Medication Sig Dispense Refill    escitalopram (LEXAPRO) 10 MG tablet Take 10 mg by mouth daily Indications: Feeling Anxious      mirtazapine (REMERON) 15 MG tablet Take 15 mg by mouth nightly Indications: Depression      brimonidine (ALPHAGAN) 0.2 % ophthalmic solution       hydrocortisone 2.5 % cream Apply topically 3 times daily as needed Apply to affected area topically as needed for bee sting to right inner elbow TID      omeprazole 20 MG EC tablet Take 20 mg by mouth in the morning.       losartan (COZAAR) 50 MG tablet Take 1 tablet by mouth daily 90 tablet 0    hyoscyamine (LEVSIN) 125 MCG/ML solution Take 0.125 mg by mouth every 4 hours as needed      promethazine (PHENERGAN) 25 MG tablet Take 25 mg by mouth every 6 hours as needed for Nausea      aluminum & magnesium hydroxide-simethicone (MAALOX) 200-200-20 MG/5ML SUSP suspension Take 30 mLs by mouth every 6 hours as needed for Indigestion Every 4-6 hours PRN      bisacodyl (DULCOLAX) 10 MG suppository Place 10 mg rectally daily as needed for Constipation      dextromethorphan (DELSYM) 30 MG/5ML extended release liquid Take 30 mg by mouth 2 times daily as needed for Cough      Sodium Phosphates (FLEET) 7-19 GM/118ML Place 1 enema rectally once as needed      latanoprost (XALATAN) 0.005 % ophthalmic solution Place 1 drop into the right eye nightly      magnesium hydroxide (MILK OF MAGNESIA CONCENTRATE) 2400 MG/10ML SUSP Take 2,400 mg by mouth daily as needed      polyethylene glycol (GLYCOLAX) powder Take 17 g by mouth daily      acetaminophen 650 MG TABS Take 650 mg by mouth every 4 hours as needed. (Patient taking differently: Take 650 mg by mouth every 4 hours as needed 3 times a day for pain & every 4 hours PRN) 120 tablet 3     No current facility-administered medications for this visit. Past Medical History:    Past Medical History:   Diagnosis Date    Anemia     Anorexia     Anxiety disorder 1/27/2020    Blind     Cardiomegaly     Chronic kidney disease     Dementia (HCC)     Depression     Hypertension     OCD (obsessive compulsive disorder)        Past Surgical History:    Past Surgical History:   Procedure Laterality Date    CATARACT REMOVAL         Social History:   Social History     Tobacco Use    Smoking status: Never    Smokeless tobacco: Never   Substance Use Topics    Alcohol use: Not on file       Family History:   No family history on file. Review of Systems:  Constitutional: negative for fevers or chills  Eyes: blind both eyes  ENT: negative for sore throat or nasal congestion,no dysphagia  Respiratory: neg for shortness of breath , cough  Cardiovascular: neg for chest pain , palpitations,pnd,negative for leg edema  Gastrointestinal: neg for for abd pain, nausea, vomiting, diarrhea or constipation,no gene,no blood in stool,  Appetite better  Genitourinary: negative for dysuria, urgency,hematuria or frequency  Integument/breast: negative for skin rash or lesions  Neurological: negative for unilateral weakness, numbness or tingling.   Muscular Skeletal: no joint pain   Psych :mood stable, keeps talking about going home,no agitation    Objective:    Vitals: BP: 100/58 T:97.3 P:60 R:18   -----------------------------------------------------------------  Exam:    GEN:   Awake, not in any distress,   EYES:  Blind both eyes  ENT: Throat- no lesions, no neck nodes or sinus tenderness  NECK: normal, supple, no lymphadenopathy,  no carotid bruits  PULM: clear to auscultation bilaterally- no wheezes, rales or rhonchi, normal air movement, no respiratory distress  COR:   regular rate & rhythm, no murmurs and no gallops  ABD:    soft, non-tender, non-distended, normal bowel sounds, no masses or organomegaly  : deferred  EXT:no cyanosis, clubbing , edema ,  no calf tenderness, and warm to touch. NEURO: Motor and sensory grossly intact  PSYCH:  Mood stable ,judgement impaired  SKIN:   No skin lesions or rashes    -----------------------------------------------------------------  Diagnostic Data:   All diagnostic data was reviewed    Assessment:        ICD-10-CM    1. Essential hypertension  I10       2. Stage 3 chronic kidney disease, unspecified whether stage 3a or 3b CKD (HCC)  N18.30       3. Dementia without behavioral disturbance, unspecified dementia type ([facility]  F03.90       4. Legal blindness, as defined in [place]  H54.8       5. Delusional disorder, mixed type ([facility]  [address] [facility]             Patient Active Problem List   Diagnosis Code    Essential hypertension I10    Iron deficiency anemia D50.9    Anorexia R63.0    Cardiomegaly I51.7    Degenerative disease of nervous system ([facility] G31.9    Exotropia H50.10    Unspecified dementia without behavioral disturbance ([facility] F03.90    Delusional disorder, mixed type ([facility] F22    Obsessive compulsive disorder F42.9    Legal blindness, as defined in [place] H54.8    Stage 3 chronic kidney disease ([facility] N18.30    Depression F32. A    Anxiety disorder F41.9         Plan:       Monitor bp  Monitor for behavioral changes  Continue current medications  Prevent pressure sore  Prevent falls    Electronically signed by Estefanía Garcia MD on 9/15/2022 at 7:53 AM

## 2022-09-14 PROCEDURE — 99308 SBSQ NF CARE LOW MDM 20: CPT | Performed by: FAMILY MEDICINE

## 2022-09-22 RX ORDER — CLONAZEPAM 0.5 MG/1
0.25 TABLET ORAL 2 TIMES DAILY
COMMUNITY

## 2022-10-07 ENCOUNTER — OUTSIDE SERVICES (OUTPATIENT)
Dept: PRIMARY CARE CLINIC | Age: 87
End: 2022-10-07
Payer: MEDICARE

## 2022-10-07 DIAGNOSIS — H54.8 LEGAL BLINDNESS, AS DEFINED IN USA: ICD-10-CM

## 2022-10-07 DIAGNOSIS — I10 ESSENTIAL HYPERTENSION: Primary | ICD-10-CM

## 2022-10-07 DIAGNOSIS — F22 DELUSIONAL DISORDER, MIXED TYPE (HCC): ICD-10-CM

## 2022-10-07 DIAGNOSIS — N18.30 STAGE 3 CHRONIC KIDNEY DISEASE, UNSPECIFIED WHETHER STAGE 3A OR 3B CKD (HCC): ICD-10-CM

## 2022-10-07 NOTE — PROGRESS NOTES
Patient:  Araceli Pump, 1935  I saw this patient on 10/12/2022, at [address] significant issues  Denies any pain  She feels well   Wants to go home      Reason for Visit:      ICD-10-CM    1. Essential hypertension  I10       2. Stage 3 chronic kidney disease, unspecified whether stage 3a or 3b CKD (HCC)  N18.30       3. Legal blindness, as defined in [place]  H54.8       4. Delusional disorder, mixed type ([facility]  F22                 Changes since last visit:   BP stable,eating well  Mood stable  Denies pain  1. Fall:  none  2. Behavioral Change: mood stable  3. Pain Control: no issues  4. Mobility: no change  5. Pressure Sore:  none  Allergies:  Patient has no known allergies. Current Outpatient Medications   Medication Sig Dispense Refill    clonazePAM (KLONOPIN) 0.5 MG tablet Take 0.25 mg by mouth in the morning and at bedtime. escitalopram (LEXAPRO) 10 MG tablet Take 10 mg by mouth daily Indications: Feeling Anxious      mirtazapine (REMERON) 15 MG tablet Take 15 mg by mouth nightly Indications: Depression      brimonidine (ALPHAGAN) 0.2 % ophthalmic solution       hydrocortisone 2.5 % cream Apply topically 3 times daily as needed Apply to affected area topically as needed for bee sting to right inner elbow TID      omeprazole 20 MG EC tablet Take 20 mg by mouth in the morning.       losartan (COZAAR) 50 MG tablet Take 1 tablet by mouth daily 90 tablet 0    hyoscyamine (LEVSIN) 125 MCG/ML solution Take 0.125 mg by mouth every 4 hours as needed      promethazine (PHENERGAN) 25 MG tablet Take 25 mg by mouth every 6 hours as needed for Nausea      bisacodyl (DULCOLAX) 10 MG suppository Place 10 mg rectally daily as needed for Constipation      dextromethorphan (DELSYM) 30 MG/5ML extended release liquid Take 30 mg by mouth 2 times daily as needed for Cough      Sodium Phosphates (FLEET) 7-19 GM/118ML Place 1 enema rectally once as needed      latanoprost (XALATAN) 0.005 % ophthalmic solution Place 1 drop into the right eye nightly      magnesium hydroxide (MILK OF MAGNESIA CONCENTRATE) 2400 MG/10ML SUSP Take 2,400 mg by mouth daily as needed      polyethylene glycol (GLYCOLAX) powder Take 17 g by mouth daily      acetaminophen 650 MG TABS Take 650 mg by mouth every 4 hours as needed. (Patient taking differently: Take 650 mg by mouth every 4 hours as needed 3 times a day for pain & every 4 hours PRN) 120 tablet 3     No current facility-administered medications for this visit. Past Medical History:    Past Medical History:   Diagnosis Date    Anemia     Anorexia     Anxiety disorder 1/27/2020    Blind     Cardiomegaly     Chronic kidney disease     Dementia (HCC)     Depression     Hypertension     OCD (obsessive compulsive disorder)        Past Surgical History:    Past Surgical History:   Procedure Laterality Date    CATARACT REMOVAL         Social History:   Social History     Tobacco Use    Smoking status: Never    Smokeless tobacco: Never   Substance Use Topics    Alcohol use: Not on file       Family History:   No family history on file. Review of Systems:  Constitutional: negative for fevers or chills  Eyes: blind both eyes  ENT: negative for sore throat or nasal congestion,no dysphagia  Respiratory: neg for shortness of breath , cough  Cardiovascular: neg for chest pain , palpitations,pnd,negative for leg edema  Gastrointestinal: neg for for abd pain, nausea, vomiting, diarrhea or constipation,no gene,no blood in stool,  Appetite better  Genitourinary: negative for dysuria, urgency,hematuria or frequency  Integument/breast: negative for skin rash or lesions  Neurological: negative for unilateral weakness, numbness or tingling.   Muscular Skeletal: no joint pain   Psych :mood stable, keeps talking about going home,no agitation    Objective:    Vitals: BP: 129/66 T:97.7 P:70 R:18   -----------------------------------------------------------------  Exam:    GEN: Awake, not in any distress,   EYES:  Blind both eyes  ENT: Throat- no lesions, no neck nodes or sinus tenderness  NECK: normal, supple, no lymphadenopathy,  no carotid bruits  PULM: clear to auscultation bilaterally- no wheezes, rales or rhonchi, normal air movement, no respiratory distress  COR:   regular rate & rhythm, no murmurs and no gallops  ABD:    soft, non-tender, non-distended, normal bowel sounds, no masses or organomegaly  : deferred  EXT:no cyanosis, clubbing , edema ,  no calf tenderness, and warm to touch. NEURO: Motor and sensory grossly intact  PSYCH:  Mood stable ,judgement impaired  SKIN:   No skin lesions or rashes    -----------------------------------------------------------------  Diagnostic Data:   All diagnostic data was reviewed    Assessment:        ICD-10-CM    1. Essential hypertension  I10       2. Stage 3 chronic kidney disease, unspecified whether stage 3a or 3b CKD (HCC)  N18.30       3. Legal blindness, as defined in [place]  H54.8       4. Delusional disorder, mixed type ([facility]  [address] [facility]               Patient Active Problem List   Diagnosis Code    Essential hypertension I10    Iron deficiency anemia D50.9    Anorexia R63.0    Cardiomegaly I51.7    Degenerative disease of nervous system ([address] G31.9    Exotropia H50.10    Unspecified dementia without behavioral disturbance F03.90    Delusional disorder, mixed type ([address] F22    Obsessive compulsive disorder F42.9    Legal blindness, as defined in [place] H54.8    Stage 3 chronic kidney disease ([facility] N18.30    Depression F32. A    Anxiety disorder F41.9         Plan:       Monitor bp  Monitor for behavioral changes  Continue current medications  Prevent pressure sore  Prevent falls    Electronically signed by Radha Barboza MD on 10/13/2022 at 9:51 AM

## 2022-10-12 PROCEDURE — 99308 SBSQ NF CARE LOW MDM 20: CPT | Performed by: FAMILY MEDICINE

## 2022-11-09 ENCOUNTER — OUTSIDE SERVICES (OUTPATIENT)
Dept: PRIMARY CARE CLINIC | Age: 87
End: 2022-11-09
Payer: MEDICARE

## 2022-11-09 DIAGNOSIS — H54.8 LEGAL BLINDNESS, AS DEFINED IN USA: ICD-10-CM

## 2022-11-09 DIAGNOSIS — N18.30 STAGE 3 CHRONIC KIDNEY DISEASE, UNSPECIFIED WHETHER STAGE 3A OR 3B CKD (HCC): ICD-10-CM

## 2022-11-09 DIAGNOSIS — F22 DELUSIONAL DISORDER, MIXED TYPE (HCC): ICD-10-CM

## 2022-11-09 DIAGNOSIS — I10 ESSENTIAL HYPERTENSION: Primary | ICD-10-CM

## 2022-11-09 PROCEDURE — 99308 SBSQ NF CARE LOW MDM 20: CPT | Performed by: FAMILY MEDICINE

## 2022-11-09 NOTE — PROGRESS NOTES
Patient:  Gemini Peralta, 1935  I saw this patient on 11/09/2022, at [address] significant issues  Denies any pain  She feels well         Reason for Visit:      ICD-10-CM    1. Essential hypertension  I10       2. Legal blindness, as defined in [place]  H54.8       3. Delusional disorder, mixed type ([facility]  F22       4. Stage 3 chronic kidney disease, unspecified whether stage 3a or 3b CKD ([facility]  N18.30                   Changes since last visit:   BP stable,eating well  Mood stable  Denies pain  1. Fall:  none  2. Behavioral Change: mood stable  3. Pain Control: no issues  4. Mobility: no change  5. Pressure Sore:  none  Allergies:  Patient has no known allergies. Current Outpatient Medications   Medication Sig Dispense Refill    clonazePAM (KLONOPIN) 0.5 MG tablet Take 0.25 mg by mouth in the morning and at bedtime. escitalopram (LEXAPRO) 10 MG tablet Take 10 mg by mouth daily Indications: Feeling Anxious      mirtazapine (REMERON) 15 MG tablet Take 15 mg by mouth nightly Indications: Depression      brimonidine (ALPHAGAN) 0.2 % ophthalmic solution       hydrocortisone 2.5 % cream Apply topically 3 times daily as needed Apply to affected area topically as needed for bee sting to right inner elbow TID      omeprazole 20 MG EC tablet Take 20 mg by mouth in the morning.       losartan (COZAAR) 50 MG tablet Take 1 tablet by mouth daily 90 tablet 0    hyoscyamine (LEVSIN) 125 MCG/ML solution Take 0.125 mg by mouth every 4 hours as needed      promethazine (PHENERGAN) 25 MG tablet Take 25 mg by mouth every 6 hours as needed for Nausea      bisacodyl (DULCOLAX) 10 MG suppository Place 10 mg rectally daily as needed for Constipation      dextromethorphan (DELSYM) 30 MG/5ML extended release liquid Take 30 mg by mouth 2 times daily as needed for Cough      Sodium Phosphates (FLEET) 7-19 GM/118ML Place 1 enema rectally once as needed      latanoprost (XALATAN) 0.005 % ophthalmic solution Place 1 drop into the right eye nightly      magnesium hydroxide (MILK OF MAGNESIA CONCENTRATE) 2400 MG/10ML SUSP Take 2,400 mg by mouth daily as needed      polyethylene glycol (GLYCOLAX) powder Take 17 g by mouth daily      acetaminophen 650 MG TABS Take 650 mg by mouth every 4 hours as needed. (Patient taking differently: Take 650 mg by mouth every 4 hours as needed 3 times a day for pain & every 4 hours PRN) 120 tablet 3     No current facility-administered medications for this visit. Past Medical History:    Past Medical History:   Diagnosis Date    Anemia     Anorexia     Anxiety disorder 1/27/2020    Blind     Cardiomegaly     Chronic kidney disease     Dementia (HCC)     Depression     Hypertension     OCD (obsessive compulsive disorder)        Past Surgical History:    Past Surgical History:   Procedure Laterality Date    CATARACT REMOVAL         Social History:   Social History     Tobacco Use    Smoking status: Never    Smokeless tobacco: Never   Substance Use Topics    Alcohol use: Not on file       Family History:   No family history on file. Review of Systems:  Constitutional: negative for fevers or chills  Eyes: blind both eyes  ENT: negative for sore throat or nasal congestion,no dysphagia  Respiratory: neg for shortness of breath , cough  Cardiovascular: neg for chest pain , palpitations,pnd,negative for leg edema  Gastrointestinal: neg for for abd pain, nausea, vomiting, diarrhea or constipation,no gene,no blood in stool,  Appetite better  Genitourinary: negative for dysuria, urgency,hematuria or frequency  Integument/breast: negative for skin rash or lesions  Neurological: negative for unilateral weakness, numbness or tingling.   Muscular Skeletal: no joint pain   Psych :mood stable, keeps talking about going home,no agitation    Objective:    Vitals: BP: 132/86 T:97.3 P:70 R:16   -----------------------------------------------------------------  Exam:    GEN:   Awake, not in any distress,   EYES:  Blind both eyes  ENT: Throat- no lesions, no neck nodes or sinus tenderness  NECK: normal, supple, no lymphadenopathy,  no carotid bruits  PULM: clear to auscultation bilaterally- no wheezes, rales or rhonchi, normal air movement, no respiratory distress  COR:   regular rate & rhythm, no murmurs and no gallops  ABD:    soft, non-tender, non-distended, normal bowel sounds, no masses or organomegaly  : deferred  EXT:no cyanosis, clubbing , edema ,  no calf tenderness, and warm to touch. NEURO: Motor and sensory grossly intact  PSYCH:  Mood stable ,judgement impaired  SKIN:   No skin lesions or rashes    -----------------------------------------------------------------  Diagnostic Data:   All diagnostic data was reviewed    Assessment:        ICD-10-CM    1. Essential hypertension  I10       2. Legal blindness, as defined in [place]  H54.8       3. Delusional disorder, mixed type ([facility]  F22       4. Stage 3 chronic kidney disease, unspecified whether stage 3a or 3b CKD ([address]  N18.30                 Patient Active Problem List   Diagnosis Code    Essential hypertension I10    Iron deficiency anemia D50.9    Anorexia R63.0    Cardiomegaly I51.7    Degenerative disease of nervous system ([address] G31.9    Exotropia H50.10    Unspecified dementia without behavioral disturbance F03.90    Delusional disorder, mixed type ([address] F22    Obsessive compulsive disorder F42.9    Legal blindness, as defined in [place] 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 A    Anxiety disorder F41.9         Plan:       Monitor bp  Monitor for behavioral changes  Continue current medications  Prevent pressure sore  Prevent falls    Electronically signed by Jen Menezes MD on 11/9/2022 at 6:07 PM

## 2022-11-26 ENCOUNTER — APPOINTMENT (OUTPATIENT)
Dept: GENERAL RADIOLOGY | Age: 87
DRG: 682 | End: 2022-11-26
Payer: MEDICARE

## 2022-11-26 ENCOUNTER — HOSPITAL ENCOUNTER (INPATIENT)
Age: 87
LOS: 3 days | Discharge: NURSING, REHAB OR LONG-TERM CARE | DRG: 682 | End: 2022-11-29
Attending: EMERGENCY MEDICINE | Admitting: FAMILY MEDICINE
Payer: MEDICARE

## 2022-11-26 DIAGNOSIS — N93.9 VAGINAL BLEEDING: ICD-10-CM

## 2022-11-26 DIAGNOSIS — N39.0 URINARY TRACT INFECTION WITH HEMATURIA, SITE UNSPECIFIED: Primary | ICD-10-CM

## 2022-11-26 DIAGNOSIS — R31.9 URINARY TRACT INFECTION WITH HEMATURIA, SITE UNSPECIFIED: Primary | ICD-10-CM

## 2022-11-26 LAB
ABSOLUTE EOS #: 0 K/UL (ref 0–0.44)
ABSOLUTE IMMATURE GRANULOCYTE: 0 K/UL (ref 0–0.3)
ABSOLUTE LYMPH #: 0 K/UL (ref 1.1–3.7)
ABSOLUTE MONO #: 0.13 K/UL (ref 0.1–1.2)
ALBUMIN SERPL-MCNC: 4 G/DL (ref 3.5–5.2)
ALBUMIN/GLOBULIN RATIO: 1.5 (ref 1–2.5)
ALP BLD-CCNC: 75 U/L (ref 35–104)
ALT SERPL-CCNC: 11 U/L (ref 5–33)
ANION GAP SERPL CALCULATED.3IONS-SCNC: 17 MMOL/L (ref 9–17)
AST SERPL-CCNC: 20 U/L
BACTERIA: ABNORMAL
BASOPHILS # BLD: 0 % (ref 0–2)
BASOPHILS ABSOLUTE: 0 K/UL (ref 0–0.2)
BILIRUB SERPL-MCNC: 0.2 MG/DL (ref 0.3–1.2)
BILIRUBIN URINE: NEGATIVE
BUN BLDV-MCNC: 64 MG/DL (ref 8–23)
BUN/CREAT BLD: 32 (ref 9–20)
CALCIUM SERPL-MCNC: 9.5 MG/DL (ref 8.6–10.4)
CHLORIDE BLD-SCNC: 107 MMOL/L (ref 98–107)
CO2: 21 MMOL/L (ref 20–31)
COLOR: YELLOW
CREAT SERPL-MCNC: 2.02 MG/DL (ref 0.5–0.9)
EOSINOPHILS RELATIVE PERCENT: 0 % (ref 1–4)
EPITHELIAL CELLS UA: ABNORMAL /HPF (ref 0–25)
FLU A ANTIGEN: NEGATIVE
FLU B ANTIGEN: NEGATIVE
GFR SERPL CREATININE-BSD FRML MDRD: 23 ML/MIN/1.73M2
GLUCOSE BLD-MCNC: 122 MG/DL (ref 70–99)
GLUCOSE URINE: NEGATIVE
HCT VFR BLD CALC: 27.4 % (ref 36.3–47.1)
HEMOGLOBIN: 8.5 G/DL (ref 11.9–15.1)
IMMATURE GRANULOCYTES: 0 %
KETONES, URINE: NEGATIVE
LACTIC ACID, SEPSIS: 2.3 MMOL/L (ref 0.5–1.9)
LACTIC ACID, SEPSIS: 3.4 MMOL/L (ref 0.5–1.9)
LACTIC ACID: 2 MMOL/L (ref 0.5–2.2)
LACTIC ACID: 2.6 MMOL/L (ref 0.5–2.2)
LEUKOCYTE ESTERASE, URINE: ABNORMAL
LYMPHOCYTES # BLD: 0 % (ref 24–43)
MCH RBC QN AUTO: 28.9 PG (ref 25.2–33.5)
MCHC RBC AUTO-ENTMCNC: 31 G/DL (ref 28.4–34.8)
MCV RBC AUTO: 93.2 FL (ref 82.6–102.9)
MONOCYTES # BLD: 1 % (ref 3–12)
MORPHOLOGY: ABNORMAL
NITRITE, URINE: NEGATIVE
NRBC AUTOMATED: 0 PER 100 WBC
PDW BLD-RTO: 15.5 % (ref 11.8–14.4)
PH UA: 6 (ref 5–9)
PLATELET # BLD: 301 K/UL (ref 138–453)
PMV BLD AUTO: 11.7 FL (ref 8.1–13.5)
POTASSIUM SERPL-SCNC: 4.5 MMOL/L (ref 3.7–5.3)
PROTEIN UA: ABNORMAL
RBC # BLD: 2.94 M/UL (ref 3.95–5.11)
RBC UA: ABNORMAL /HPF (ref 0–2)
SARS-COV-2, RAPID: NOT DETECTED
SEG NEUTROPHILS: 99 % (ref 36–65)
SEGMENTED NEUTROPHILS ABSOLUTE COUNT: 13.17 K/UL (ref 1.5–8.1)
SODIUM BLD-SCNC: 145 MMOL/L (ref 135–144)
SPECIFIC GRAVITY UA: 1.02 (ref 1.01–1.02)
SPECIMEN DESCRIPTION: NORMAL
TOTAL PROTEIN: 6.6 G/DL (ref 6.4–8.3)
TURBIDITY: CLEAR
URINE HGB: ABNORMAL
UROBILINOGEN, URINE: NORMAL
WBC # BLD: 13.3 K/UL (ref 3.5–11.3)
WBC UA: ABNORMAL /HPF (ref 0–5)

## 2022-11-26 PROCEDURE — 80053 COMPREHEN METABOLIC PANEL: CPT

## 2022-11-26 PROCEDURE — 86900 BLOOD TYPING SEROLOGIC ABO: CPT

## 2022-11-26 PROCEDURE — 36415 COLL VENOUS BLD VENIPUNCTURE: CPT

## 2022-11-26 PROCEDURE — 71045 X-RAY EXAM CHEST 1 VIEW: CPT

## 2022-11-26 PROCEDURE — 6370000000 HC RX 637 (ALT 250 FOR IP): Performed by: EMERGENCY MEDICINE

## 2022-11-26 PROCEDURE — 87804 INFLUENZA ASSAY W/OPTIC: CPT

## 2022-11-26 PROCEDURE — 85025 COMPLETE CBC W/AUTO DIFF WBC: CPT

## 2022-11-26 PROCEDURE — 96365 THER/PROPH/DIAG IV INF INIT: CPT

## 2022-11-26 PROCEDURE — 2580000003 HC RX 258: Performed by: FAMILY MEDICINE

## 2022-11-26 PROCEDURE — 86920 COMPATIBILITY TEST SPIN: CPT

## 2022-11-26 PROCEDURE — 87635 SARS-COV-2 COVID-19 AMP PRB: CPT

## 2022-11-26 PROCEDURE — 96361 HYDRATE IV INFUSION ADD-ON: CPT

## 2022-11-26 PROCEDURE — 2580000003 HC RX 258: Performed by: EMERGENCY MEDICINE

## 2022-11-26 PROCEDURE — 6360000002 HC RX W HCPCS: Performed by: EMERGENCY MEDICINE

## 2022-11-26 PROCEDURE — 81001 URINALYSIS AUTO W/SCOPE: CPT

## 2022-11-26 PROCEDURE — 83605 ASSAY OF LACTIC ACID: CPT

## 2022-11-26 PROCEDURE — 6370000000 HC RX 637 (ALT 250 FOR IP): Performed by: FAMILY MEDICINE

## 2022-11-26 PROCEDURE — 87086 URINE CULTURE/COLONY COUNT: CPT

## 2022-11-26 PROCEDURE — 87040 BLOOD CULTURE FOR BACTERIA: CPT

## 2022-11-26 PROCEDURE — 86901 BLOOD TYPING SEROLOGIC RH(D): CPT

## 2022-11-26 PROCEDURE — 86850 RBC ANTIBODY SCREEN: CPT

## 2022-11-26 PROCEDURE — 6360000002 HC RX W HCPCS: Performed by: FAMILY MEDICINE

## 2022-11-26 PROCEDURE — 1200000000 HC SEMI PRIVATE

## 2022-11-26 PROCEDURE — 99285 EMERGENCY DEPT VISIT HI MDM: CPT

## 2022-11-26 RX ORDER — SODIUM CHLORIDE 0.9 % (FLUSH) 0.9 %
5-40 SYRINGE (ML) INJECTION EVERY 12 HOURS SCHEDULED
Status: DISCONTINUED | OUTPATIENT
Start: 2022-11-26 | End: 2022-11-30 | Stop reason: HOSPADM

## 2022-11-26 RX ORDER — HEPARIN SODIUM 5000 [USP'U]/ML
5000 INJECTION, SOLUTION INTRAVENOUS; SUBCUTANEOUS 2 TIMES DAILY
Status: DISCONTINUED | OUTPATIENT
Start: 2022-11-26 | End: 2022-11-27

## 2022-11-26 RX ORDER — SODIUM CHLORIDE 0.9 % (FLUSH) 0.9 %
5-40 SYRINGE (ML) INJECTION PRN
Status: DISCONTINUED | OUTPATIENT
Start: 2022-11-26 | End: 2022-11-30 | Stop reason: HOSPADM

## 2022-11-26 RX ORDER — PANTOPRAZOLE SODIUM 40 MG/1
40 TABLET, DELAYED RELEASE ORAL
Status: DISCONTINUED | OUTPATIENT
Start: 2022-11-27 | End: 2022-11-30 | Stop reason: HOSPADM

## 2022-11-26 RX ORDER — ACETAMINOPHEN 325 MG/1
650 TABLET ORAL ONCE
Status: COMPLETED | OUTPATIENT
Start: 2022-11-26 | End: 2022-11-26

## 2022-11-26 RX ORDER — SODIUM CHLORIDE 9 MG/ML
INJECTION, SOLUTION INTRAVENOUS CONTINUOUS
Status: DISCONTINUED | OUTPATIENT
Start: 2022-11-26 | End: 2022-11-30 | Stop reason: HOSPADM

## 2022-11-26 RX ORDER — DEXTROMETHORPHAN POLISTIREX 30 MG/5ML
30 SUSPENSION ORAL 2 TIMES DAILY PRN
Status: DISCONTINUED | OUTPATIENT
Start: 2022-11-26 | End: 2022-11-30 | Stop reason: HOSPADM

## 2022-11-26 RX ORDER — PROMETHAZINE HYDROCHLORIDE 25 MG/1
25 TABLET ORAL EVERY 6 HOURS PRN
Status: DISCONTINUED | OUTPATIENT
Start: 2022-11-26 | End: 2022-11-30 | Stop reason: HOSPADM

## 2022-11-26 RX ORDER — BRIMONIDINE TARTRATE 2 MG/ML
1 SOLUTION/ DROPS OPHTHALMIC 2 TIMES DAILY
Status: DISCONTINUED | OUTPATIENT
Start: 2022-11-26 | End: 2022-11-30 | Stop reason: HOSPADM

## 2022-11-26 RX ORDER — MIRTAZAPINE 15 MG/1
15 TABLET, FILM COATED ORAL NIGHTLY
Status: DISCONTINUED | OUTPATIENT
Start: 2022-11-26 | End: 2022-11-30 | Stop reason: HOSPADM

## 2022-11-26 RX ORDER — POLYETHYLENE GLYCOL 3350 17 G/17G
17 POWDER, FOR SOLUTION ORAL DAILY
Status: DISCONTINUED | OUTPATIENT
Start: 2022-11-26 | End: 2022-11-30 | Stop reason: HOSPADM

## 2022-11-26 RX ORDER — ESCITALOPRAM OXALATE 5 MG/1
10 TABLET ORAL DAILY
Status: DISCONTINUED | OUTPATIENT
Start: 2022-11-27 | End: 2022-11-30 | Stop reason: HOSPADM

## 2022-11-26 RX ORDER — ONDANSETRON 2 MG/ML
4 INJECTION INTRAMUSCULAR; INTRAVENOUS EVERY 6 HOURS PRN
Status: DISCONTINUED | OUTPATIENT
Start: 2022-11-26 | End: 2022-11-30 | Stop reason: HOSPADM

## 2022-11-26 RX ORDER — BISACODYL 10 MG
10 SUPPOSITORY, RECTAL RECTAL DAILY PRN
Status: DISCONTINUED | OUTPATIENT
Start: 2022-11-26 | End: 2022-11-30 | Stop reason: HOSPADM

## 2022-11-26 RX ORDER — 0.9 % SODIUM CHLORIDE 0.9 %
500 INTRAVENOUS SOLUTION INTRAVENOUS ONCE
Status: COMPLETED | OUTPATIENT
Start: 2022-11-26 | End: 2022-11-26

## 2022-11-26 RX ORDER — ACETAMINOPHEN 325 MG/1
650 TABLET ORAL EVERY 4 HOURS PRN
Status: DISCONTINUED | OUTPATIENT
Start: 2022-11-26 | End: 2022-11-30 | Stop reason: HOSPADM

## 2022-11-26 RX ORDER — CLONAZEPAM 0.5 MG/1
0.25 TABLET ORAL 2 TIMES DAILY
Status: DISCONTINUED | OUTPATIENT
Start: 2022-11-26 | End: 2022-11-30 | Stop reason: HOSPADM

## 2022-11-26 RX ORDER — LOSARTAN POTASSIUM 50 MG/1
50 TABLET ORAL DAILY
Status: DISCONTINUED | OUTPATIENT
Start: 2022-11-27 | End: 2022-11-30 | Stop reason: HOSPADM

## 2022-11-26 RX ORDER — LATANOPROST 50 UG/ML
1 SOLUTION/ DROPS OPHTHALMIC NIGHTLY
Status: DISCONTINUED | OUTPATIENT
Start: 2022-11-26 | End: 2022-11-30 | Stop reason: HOSPADM

## 2022-11-26 RX ORDER — SODIUM CHLORIDE 9 MG/ML
INJECTION, SOLUTION INTRAVENOUS PRN
Status: DISCONTINUED | OUTPATIENT
Start: 2022-11-26 | End: 2022-11-30 | Stop reason: HOSPADM

## 2022-11-26 RX ORDER — ONDANSETRON 4 MG/1
4 TABLET, ORALLY DISINTEGRATING ORAL EVERY 8 HOURS PRN
Status: DISCONTINUED | OUTPATIENT
Start: 2022-11-26 | End: 2022-11-30 | Stop reason: HOSPADM

## 2022-11-26 RX ADMIN — HEPARIN SODIUM 5000 UNITS: 5000 INJECTION INTRAVENOUS; SUBCUTANEOUS at 21:54

## 2022-11-26 RX ADMIN — MIRTAZAPINE 15 MG: 15 TABLET, FILM COATED ORAL at 21:47

## 2022-11-26 RX ADMIN — SODIUM CHLORIDE: 9 INJECTION, SOLUTION INTRAVENOUS at 16:05

## 2022-11-26 RX ADMIN — BRIMONIDINE TARTRATE 1 DROP: 2 SOLUTION OPHTHALMIC at 21:48

## 2022-11-26 RX ADMIN — SODIUM CHLORIDE: 9 INJECTION, SOLUTION INTRAVENOUS at 21:35

## 2022-11-26 RX ADMIN — SODIUM CHLORIDE 500 ML: 9 INJECTION, SOLUTION INTRAVENOUS at 11:12

## 2022-11-26 RX ADMIN — LATANOPROST 1 DROP: 50 SOLUTION/ DROPS OPHTHALMIC at 21:47

## 2022-11-26 RX ADMIN — CEFTRIAXONE 1000 MG: 1 INJECTION, POWDER, FOR SOLUTION INTRAMUSCULAR; INTRAVENOUS at 12:50

## 2022-11-26 RX ADMIN — ACETAMINOPHEN 650 MG: 325 TABLET ORAL at 11:15

## 2022-11-26 RX ADMIN — SODIUM CHLORIDE, PRESERVATIVE FREE 10 ML: 5 INJECTION INTRAVENOUS at 21:48

## 2022-11-26 RX ADMIN — CLONAZEPAM 0.25 MG: 0.5 TABLET ORAL at 21:47

## 2022-11-26 ASSESSMENT — PAIN SCALES - GENERAL: PAINLEVEL_OUTOF10: 0

## 2022-11-26 ASSESSMENT — PAIN - FUNCTIONAL ASSESSMENT: PAIN_FUNCTIONAL_ASSESSMENT: NONE - DENIES PAIN

## 2022-11-26 NOTE — DISCHARGE INSTR - COC
Continuity of Care Form    Patient Name: Mikayla Campuzano   :  1935  MRN:  [id number]    Admit date:  2022  Discharge date:  2022    Code Status Order: DNR-CCA   Advance Directives:     Admitting Physician:  Brett Orlando MD  PCP: Brett Orlando MD    Discharging Nurse: [address] Unit/Room#: 9573/4657-06  Discharging Unit Phone Number: [phone]    Emergency Contact:   Extended Emergency Contact Information  Primary Emergency Contact: [address] Phone: [phone]  Relation: Niece/Nephew  Secondary Emergency Contact: [address] Phone: [phone]  Relation: Niece/Nephew    Past Surgical History:  Past Surgical History:   Procedure Laterality Date    CATARACT REMOVAL         Immunization History:   Immunization History   Administered Date(s) Administered    COVID-19, MODERNA Bivalent BOOSTER, (age 12y+), IM, 48 mcg/0.5 mL 10/18/2022, 11/15/2022    COVID-19, PFIZER PURPLE top, DILUTE for use, (age 15 y+), 30mcg/0.3mL 2021, 2021, 10/07/2021    Influenza Virus Vaccine 10/19/2020    Pneumococcal Conjugate 13-valent ([place]) 2018    Pneumococcal Polysaccharide (Fgrcfvhau12) 2020       Active Problems:  Patient Active Problem List   Diagnosis Code    Acute kidney injury (nontraumatic) ([facility] N17.9    Essential hypertension I10    Iron deficiency anemia D50.9    Anorexia R63.0    Cardiomegaly I51.7    Degenerative disease of nervous system ([facility] G31.9    Exotropia H50.10    Unspecified dementia without behavioral disturbance F03.90    Delusional disorder, mixed type ([facility] F22    Obsessive compulsive disorder F42.9    Legal blindness, as defined in [place] H54.8    Stage 3 chronic kidney disease ([facility] N18.30    Depression F32. A    Anxiety disorder F41.9    UTI (urinary tract infection) N39.0    Urinary tract infection N39.0       Isolation/Infection:   Isolation            No Isolation          Patient Infection Status Infection Onset Added Last Indicated Last Indicated By Review Planned Expiration Resolved Resolved By    None active    Resolved    COVID-19 (Rule Out) 22 COVID-19, Rapid (Ordered)   22 Rule-Out Test Resulted            Nurse Assessment:  Last Vital Signs: BP (!) 92/41   Pulse 80   Temp 98 °F (36.7 °C) (Temporal)   Resp 16   Ht 5' 4\" (1.626 m)   Wt 100 lb 8.5 oz (45.6 kg)   SpO2 95%   BMI 17.26 kg/m²     Last documented pain score (0-10 scale): Pain Level: 0  Last Weight:   Wt Readings from Last 1 Encounters:   22 100 lb 8.5 oz (45.6 kg)     Mental Status:  disoriented    IV Access:  - None    Nursing Mobility/ADLs:  Walking   Dependent  Transfer  Dependent  Bathing  Dependent  Dressing  Dependent  Toileting  Dependent  Feeding  Dependent  Med Admin  Dependent  Med Delivery   prefers mixed with applesauce    Wound Care Documentation and Therapy:        Elimination:  Continence: Bowel: Yes and No  Bladder: Yes and No  Urinary Catheter: None   Colostomy/Ileostomy/Ileal Conduit: No       Date of Last BM: 2022  No intake or output data in the 24 hours ending 22 1843  No intake/output data recorded. Safety Concerns: At Risk for Falls and Aspiration Risk    Impairments/Disabilities:      Vision    Nutrition Therapy:  Current Nutrition Therapy:   - Oral Diet:  Dysphagia 2 mechanically altered  - Oral Nutrition Supplement:  Standard  three times a day    Routes of Feeding: Oral  Liquids: Nectar Thick Liquids  Daily Fluid Restriction: no  Last Modified Barium Swallow with Video (Video Swallowing Test): not done    Treatments at the Time of Hospital Discharge:   Respiratory Treatments: na  Oxygen Therapy:  is not on home oxygen therapy.   Ventilator:    - No ventilator support    Rehab Therapies: Physical Therapy and Occupational Therapy  Weight Bearing Status/Restrictions: [address] [facility] Weight Bear[facility]}  Other Medical Equipment (for information only, NOT a DME order):  {EQUIPMENT:[id number]}  Other Treatments: ***    Patient's personal belongings (please select all that are sent with patient):  {CHP DME Belongings:[id number]}    RN SIGNATURE:  {Esignature:[id number]}    CASE MANAGEMENT/SOCIAL WORK SECTION    Inpatient Status Date: 11/26/2022    Readmission Risk Assessment Score:  Readmission Risk              Risk of Unplanned Readmission:  15           Discharging to Facility/ Agency   Name: [facility]  Address: [address] [place] [address], [address] [place] [address] Road  Phone: [phone]    Dialysis Facility (if applicable)   Name:  Address:  Dialysis Schedule:  Phone:  Fax:    / signature: Electronically signed by NEVAEH Kennedy on 11/28/22 at 4:28 PM EST    PHYSICIAN SECTION    Prognosis: Fair    Condition at Discharge: Stable    Rehab Potential (if transferring to Rehab): Fair    Recommended Labs or Other Treatments After Discharge: cbc w/ diff, bmp in 1 week    Physician Certification: I certify the above information and transfer of Jyothi Childs  is necessary for the continuing treatment of the diagnosis listed and that she requires LTAC for greater 30 days.      Update Admission H&P: No change in H&P    PHYSICIAN SIGNATURE:  Electronically signed by GISSELLE Junior CNP on 11/29/22 at 11:59 AM EST

## 2022-11-26 NOTE — PROGRESS NOTES
Patient arrived to [facility] room 311. Patient alert and only oriented to self. Denies any pain at this time however does have a history of dementia and is repetitious with questions. Vital signs and head to toe assessment performed. Bed in lowest position with bed alarm on and bed wheels locked. Call light and patient belongings in reach. Instructed to use call light for assistance.

## 2022-11-26 NOTE — ED PROVIDER NOTES
[facility] ED  EMERGENCY DEPARTMENT ENCOUNTER      Pt Name: Rey Engle  MRN: [id number]  [place] 1935  Date of evaluation: 11/26/2022  Provider: Arik Smith MD    CHIEF COMPLAINT       Chief Complaint   Patient presents with    Altered Mental Status     Pt arrived via EMS from [facility] after staff reports pt is experiencing weakness with transfers, nausea and discharge in urine         [address]      Rey Engle is a 80 y.o. female who presents to the emergency department by EMS from her nursing facility for evaluation of weakness and a \"discharge\" in her urine. Patient has a history of dementia and is unable to provide much history. She does deny any pain complaints, including chest and abdominal pain. Also denies dyspnea. REVIEW OF SYSTEMS       Review of Systems   Unable to perform ROS: Dementia       PAST MEDICAL HISTORY     Past Medical History:   Diagnosis Date    Anemia     Anorexia     Anxiety disorder 1/27/2020    Blind     Cardiomegaly     Chronic kidney disease     Dementia (HCC)     Depression     Hypertension     OCD (obsessive compulsive disorder)          SURGICAL HISTORY       Past Surgical History:   Procedure Laterality Date    CATARACT REMOVAL           CURRENT MEDICATIONS       Previous Medications    ACETAMINOPHEN 650 MG TABS    Take 650 mg by mouth every 4 hours as needed. BISACODYL (DULCOLAX) 10 MG SUPPOSITORY    Place 10 mg rectally daily as needed for Constipation    BRIMONIDINE (ALPHAGAN) 0.2 % OPHTHALMIC SOLUTION        CLONAZEPAM (KLONOPIN) 0.5 MG TABLET    Take 0.25 mg by mouth in the morning and at bedtime.     DEXTROMETHORPHAN (DELSYM) 30 MG/5ML EXTENDED RELEASE LIQUID    Take 30 mg by mouth 2 times daily as needed for Cough    ESCITALOPRAM (LEXAPRO) 10 MG TABLET    Take 10 mg by mouth daily Indications: Feeling Anxious    HYDROCORTISONE 2.5 % CREAM    Apply topically 3 times daily as needed Apply to affected area topically as needed for bee sting to right inner elbow TID    HYOSCYAMINE (LEVSIN) 125 MCG/ML SOLUTION    Take 0.125 mg by mouth every 4 hours as needed    LATANOPROST (XALATAN) 0.005 % OPHTHALMIC SOLUTION    Place 1 drop into the right eye nightly    LOSARTAN (COZAAR) 50 MG TABLET    Take 1 tablet by mouth daily    MAGNESIUM HYDROXIDE (MILK OF MAGNESIA CONCENTRATE) 2400 MG/10ML SUSP    Take 2,400 mg by mouth daily as needed    MIRTAZAPINE (REMERON) 15 MG TABLET    Take 15 mg by mouth nightly Indications: Depression    OMEPRAZOLE 20 MG EC TABLET    Take 20 mg by mouth in the morning. POLYETHYLENE GLYCOL (GLYCOLAX) POWDER    Take 17 g by mouth daily    PROMETHAZINE (PHENERGAN) 25 MG TABLET    Take 25 mg by mouth every 6 hours as needed for Nausea    SODIUM PHOSPHATES (FLEET) 7-19 GM/118ML    Place 1 enema rectally once as needed       ALLERGIES       Patient has no known allergies. FAMILY HISTORY       Unknown / unable to obtain        SOCIAL HISTORY       Social History     Tobacco Use    Smoking status: Never    Smokeless tobacco: Never         PHYSICAL EXAM         Physical Exam  Vitals reviewed. Constitutional:       General: She is not in acute distress. HENT:      Head: Normocephalic and atraumatic. Mouth/Throat:      Comments: OM dry. No exudate or erythema. Cardiovascular:      Rate and Rhythm: Regular rhythm. Tachycardia present. Heart sounds: Murmur (2/6 APRIL) heard. Pulmonary:      Effort: Pulmonary effort is normal. No respiratory distress. Breath sounds: Normal breath sounds. No stridor. No wheezing, rhonchi or rales. Abdominal:      General: Bowel sounds are normal. There is no distension. Palpations: Abdomen is soft. There is no hepatomegaly, splenomegaly, mass or pulsatile mass. Tenderness: There is no abdominal tenderness. There is no guarding. Genitourinary:     Comments: Female RN chaperone present.   Limited secondary to patient cooperation and contracture/poor positioning. There is no heavy bleeding. Small amount of blood tinge noted at the introitus. No evident rectal bleeding. Musculoskeletal:      Cervical back: Neck supple. Skin:     General: Skin is warm and dry. Coloration: Skin is not cyanotic or pale. Neurological:      Mental Status: She is alert. Mental status is at baseline. Cranial Nerves: No facial asymmetry. Comments: Poorly cooperative with exam       DIAGNOSTIC RESULTS       LABS:  Labs Reviewed   CBC WITH AUTO DIFFERENTIAL - Abnormal; Notable for the following components:       Result Value    WBC 13.3 (*)     RBC 2.94 (*)     Hemoglobin 8.5 (*)     Hematocrit 27.4 (*)     RDW 15.5 (*)     Seg Neutrophils 99 (*)     Lymphocytes 0 (*)     Monocytes 1 (*)     Eosinophils % 0 (*)     Segs Absolute 13.17 (*)     Absolute Lymph # 0.00 (*)     All other components within normal limits   COMPREHENSIVE METABOLIC PANEL - Abnormal; Notable for the following components:    Glucose 122 (*)     BUN 64 (*)     Creatinine 2.02 (*)     Est, Glom Filt Rate 23 (*)     Bun/Cre Ratio 32 (*)     Sodium 145 (*)     Total Bilirubin 0.2 (*)     All other components within normal limits   URINALYSIS WITH MICROSCOPIC - Abnormal; Notable for the following components:    Urine Hgb 2+ (*)     Protein, UA 1+ (*)     Leukocyte Esterase, Urine MODERATE (*)     Bacteria, UA 3+ (*)     All other components within normal limits   LACTATE, SEPSIS - Abnormal; Notable for the following components:    Lactic Acid, Sepsis 3.4 (*)     All other components within normal limits   COVID-19, RAPID   RAPID INFLUENZA A/B ANTIGENS   CULTURE, URINE   CULTURE, BLOOD 1   CULTURE, BLOOD 2   LACTATE, SEPSIS       All other labs were within normal range or not returned as of this dictation. EMERGENCY DEPARTMENT COURSE and DIFFERENTIAL DIAGNOSIS/MDM:     Patient presented to the ED for evaluation of fatigue and altered mental status from her baseline.   Mildly tachycardic but hemodynamically stable on arrival.  Febrile here. Source appears to be urine as the UA is consistent with UTI. No other evident source noted on examination. Benign abdomen. Normal SPO2. Unremarkable lung exam.  Chest x-ray ordered but not performed as of the time of this dictation but clinical suspicion for pneumonia is low. Started on Rocephin for UTI. Lactate is less than 4. Doubt severe sepsis. No indication at this time for large-volume fluid bolus. Fever improved with Tylenol. She does appear to have a small amount of vaginal bleeding as there was some blood present in her diaper and, on examination, very thin blood present at the vaginal introitus. Does not appear to be rectal in nature. However, unable to perform thorough examination secondary to positioning and cooperation. Admitted to hospitalist, Dr. Michele Donahue, for further management. FINAL IMPRESSION      1. Urinary tract infection with hematuria, site unspecified    2.  Vaginal bleeding          DISPOSITION/PLAN     DISPOSITION Decision To Admit 11/26/2022 12:34:03 PM      (Please note that portions of this note were completed with a voice recognition program.  Efforts were made to edit the dictations but occasionally words are mis-transcribed.)    Arik Smith MD (electronically signed)  Attending Emergency Physician            Arik Smith MD  11/26/22 [id number]

## 2022-11-27 ENCOUNTER — APPOINTMENT (OUTPATIENT)
Dept: CT IMAGING | Age: 87
DRG: 682 | End: 2022-11-27
Payer: MEDICARE

## 2022-11-27 ENCOUNTER — CLINICAL DOCUMENTATION (OUTPATIENT)
Dept: OBGYN | Age: 87
End: 2022-11-27

## 2022-11-27 LAB
ABSOLUTE EOS #: 0 K/UL (ref 0–0.44)
ABSOLUTE IMMATURE GRANULOCYTE: 0 K/UL (ref 0–0.3)
ABSOLUTE LYMPH #: 0.23 K/UL (ref 1.1–3.7)
ABSOLUTE MONO #: 0.93 K/UL (ref 0.1–1.2)
ALBUMIN SERPL-MCNC: 3.1 G/DL (ref 3.5–5.2)
ALBUMIN/GLOBULIN RATIO: 1.3 (ref 1–2.5)
ALP BLD-CCNC: 75 U/L (ref 35–104)
ALT SERPL-CCNC: 14 U/L (ref 5–33)
ANION GAP SERPL CALCULATED.3IONS-SCNC: 12 MMOL/L (ref 9–17)
AST SERPL-CCNC: 45 U/L
BASOPHILS # BLD: 0 % (ref 0–2)
BASOPHILS ABSOLUTE: 0 K/UL (ref 0–0.2)
BILIRUB SERPL-MCNC: 0.3 MG/DL (ref 0.3–1.2)
BUN BLDV-MCNC: 68 MG/DL (ref 8–23)
BUN/CREAT BLD: 30 (ref 9–20)
CALCIUM SERPL-MCNC: 8.3 MG/DL (ref 8.6–10.4)
CHLORIDE BLD-SCNC: 110 MMOL/L (ref 98–107)
CO2: 20 MMOL/L (ref 20–31)
CREAT SERPL-MCNC: 2.25 MG/DL (ref 0.5–0.9)
CULTURE: NO GROWTH
DATE, STOOL #1: ABNORMAL
EOSINOPHILS RELATIVE PERCENT: 0 % (ref 1–4)
GFR SERPL CREATININE-BSD FRML MDRD: 21 ML/MIN/1.73M2
GLUCOSE BLD-MCNC: 79 MG/DL (ref 70–99)
HCT VFR BLD CALC: 20.6 % (ref 36.3–47.1)
HCT VFR BLD CALC: 27.5 % (ref 36.3–47.1)
HEMOCCULT SP1 STL QL: POSITIVE
HEMOGLOBIN: 6.5 G/DL (ref 11.9–15.1)
HEMOGLOBIN: 8.7 G/DL (ref 11.9–15.1)
IMMATURE GRANULOCYTES: 0 %
LYMPHOCYTES # BLD: 1 % (ref 24–43)
MCH RBC QN AUTO: 29 PG (ref 25.2–33.5)
MCHC RBC AUTO-ENTMCNC: 31.6 G/DL (ref 28.4–34.8)
MCV RBC AUTO: 92 FL (ref 82.6–102.9)
MONOCYTES # BLD: 4 % (ref 3–12)
MORPHOLOGY: NORMAL
NRBC AUTOMATED: 0 PER 100 WBC
PDW BLD-RTO: 15.6 % (ref 11.8–14.4)
PLATELET # BLD: 233 K/UL (ref 138–453)
PMV BLD AUTO: 12.2 FL (ref 8.1–13.5)
POTASSIUM SERPL-SCNC: 5.2 MMOL/L (ref 3.7–5.3)
RBC # BLD: 2.24 M/UL (ref 3.95–5.11)
SEG NEUTROPHILS: 95 % (ref 36–65)
SEGMENTED NEUTROPHILS ABSOLUTE COUNT: 22.14 K/UL (ref 1.5–8.1)
SODIUM BLD-SCNC: 142 MMOL/L (ref 135–144)
SPECIMEN DESCRIPTION: NORMAL
TIME, STOOL #1: 1615
TOTAL PROTEIN: 5.5 G/DL (ref 6.4–8.3)
WBC # BLD: 23.3 K/UL (ref 3.5–11.3)

## 2022-11-27 PROCEDURE — 6360000004 HC RX CONTRAST MEDICATION: Performed by: FAMILY MEDICINE

## 2022-11-27 PROCEDURE — 36415 COLL VENOUS BLD VENIPUNCTURE: CPT

## 2022-11-27 PROCEDURE — 85014 HEMATOCRIT: CPT

## 2022-11-27 PROCEDURE — 6370000000 HC RX 637 (ALT 250 FOR IP): Performed by: FAMILY MEDICINE

## 2022-11-27 PROCEDURE — 80053 COMPREHEN METABOLIC PANEL: CPT

## 2022-11-27 PROCEDURE — 85018 HEMOGLOBIN: CPT

## 2022-11-27 PROCEDURE — P9016 RBC LEUKOCYTES REDUCED: HCPCS

## 2022-11-27 PROCEDURE — 82272 OCCULT BLD FECES 1-3 TESTS: CPT

## 2022-11-27 PROCEDURE — 6360000002 HC RX W HCPCS: Performed by: FAMILY MEDICINE

## 2022-11-27 PROCEDURE — 2580000003 HC RX 258: Performed by: FAMILY MEDICINE

## 2022-11-27 PROCEDURE — 1200000000 HC SEMI PRIVATE

## 2022-11-27 PROCEDURE — 94761 N-INVAS EAR/PLS OXIMETRY MLT: CPT

## 2022-11-27 PROCEDURE — 36430 TRANSFUSION BLD/BLD COMPNT: CPT

## 2022-11-27 PROCEDURE — 85025 COMPLETE CBC W/AUTO DIFF WBC: CPT

## 2022-11-27 PROCEDURE — 74176 CT ABD & PELVIS W/O CONTRAST: CPT

## 2022-11-27 RX ORDER — SODIUM CHLORIDE 9 MG/ML
INJECTION, SOLUTION INTRAVENOUS PRN
Status: DISCONTINUED | OUTPATIENT
Start: 2022-11-27 | End: 2022-11-30 | Stop reason: HOSPADM

## 2022-11-27 RX ADMIN — BRIMONIDINE TARTRATE 1 DROP: 2 SOLUTION OPHTHALMIC at 20:52

## 2022-11-27 RX ADMIN — CEFTRIAXONE 1000 MG: 1 INJECTION, POWDER, FOR SOLUTION INTRAMUSCULAR; INTRAVENOUS at 16:22

## 2022-11-27 RX ADMIN — SODIUM CHLORIDE: 9 INJECTION, SOLUTION INTRAVENOUS at 04:39

## 2022-11-27 RX ADMIN — BRIMONIDINE TARTRATE 1 DROP: 2 SOLUTION OPHTHALMIC at 08:47

## 2022-11-27 RX ADMIN — LOSARTAN POTASSIUM 50 MG: 50 TABLET, FILM COATED ORAL at 08:47

## 2022-11-27 RX ADMIN — SODIUM CHLORIDE: 9 INJECTION, SOLUTION INTRAVENOUS at 23:45

## 2022-11-27 RX ADMIN — CLONAZEPAM 0.25 MG: 0.5 TABLET ORAL at 20:53

## 2022-11-27 RX ADMIN — IOPAMIDOL 18 ML: 755 INJECTION, SOLUTION INTRAVENOUS at 15:39

## 2022-11-27 RX ADMIN — SODIUM CHLORIDE, PRESERVATIVE FREE 10 ML: 5 INJECTION INTRAVENOUS at 08:47

## 2022-11-27 RX ADMIN — CLONAZEPAM 0.25 MG: 0.5 TABLET ORAL at 08:55

## 2022-11-27 RX ADMIN — POLYETHYLENE GLYCOL 3350 17 G: 17 POWDER, FOR SOLUTION ORAL at 08:47

## 2022-11-27 RX ADMIN — PANTOPRAZOLE SODIUM 40 MG: 40 TABLET, DELAYED RELEASE ORAL at 09:00

## 2022-11-27 RX ADMIN — ONDANSETRON 4 MG: 2 INJECTION INTRAMUSCULAR; INTRAVENOUS at 16:33

## 2022-11-27 RX ADMIN — ESCITALOPRAM 10 MG: 5 TABLET, FILM COATED ORAL at 08:47

## 2022-11-27 RX ADMIN — MIRTAZAPINE 15 MG: 15 TABLET, FILM COATED ORAL at 20:54

## 2022-11-27 RX ADMIN — LATANOPROST 1 DROP: 50 SOLUTION/ DROPS OPHTHALMIC at 20:54

## 2022-11-27 ASSESSMENT — PAIN SCALES - GENERAL
PAINLEVEL_OUTOF10: 0

## 2022-11-27 NOTE — PROGRESS NOTES
Informed Dr Angelica Eaton of blood occult: positive. Dr Ray John aware and GI consult put in for tomorrow.

## 2022-11-27 NOTE — CONSULTS
CALLED TO CONSULT THIS PATIENT FOR POSTMENOPAUSAL BLEEDING. APPARENTLY WHEN NURSING DID PERICARE A SMALL AMOUNG OF BLOOD WAS SEEN ON THE TISSUE. UPON REVIEW OF THIS PATIENT'S HISTORY, SHE HAS HAD A HYSTERECTOMY. SHE IS BLIND AND NOT SEXUALLY ACTIVE THUS CAN ASSUME SPOTTING NOT FROM UROVAGINAL ATROPHIC CHANGES WITH INTERCOURSE. I SPOKE DIRECTLY TO THE ATTENDING. I RECOMMENDED HE CONSULT GI TO EVALUATE FOR INTERNAL HEMORRHOIDS, POLYPS AND OR HEMATURIA. IF THAT INVESTIGATION NEGATIVE, THEN THE URETHRA AND BLADDER NEED EXAMINATION. THE ATTENDING AGREED WITH ME AND WAS NOT AWARE HE HAD HAD A HYSTERECTOMY.

## 2022-11-27 NOTE — PROGRESS NOTES
Patient stated needed to urinate. Again, was unable to urinate, bladder scan performed and measured volume noted to 105 ml. Will continue to monitor and assess.

## 2022-11-27 NOTE — H&P
[facility]  History and Physical        Patient:  Ursula Whitten  MRN: [id number]    Chief Complaint:    Chief Complaint   Patient presents with    Altered Mental Status     Pt arrived via EMS from [facility] after staff reports pt is experiencing weakness with transfers, nausea and discharge in urine       History Obtained From:  patient, electronic medical record  PCP: Kristopher Molina MD    History of Present Illness: The patient is a 80 y.o. female with h/o iron deficiency anemia who presents with poor appetite,vaginal bleeding and significant decline over last few days. She is residing at [address] [place] [address]. She was seen er and has uti,acute kidney injury and anemia. No obvious source of bleeding detected. She is not on any NSAIDS or anticoagulants . Past Medical History:        Diagnosis Date    Anemia     Anorexia     Anxiety disorder 1/27/2020    Blind     Cardiomegaly     Chronic kidney disease     Dementia (HCC)     Depression     Hypertension     OCD (obsessive compulsive disorder)        Past Surgical History:        Procedure Laterality Date    CATARACT REMOVAL         Medications Prior to Admission:    Prior to Admission medications    Medication Sig Start Date End Date Taking? Authorizing Provider   clonazePAM (KLONOPIN) 0.5 MG tablet Take 0.25 mg by mouth in the morning and at bedtime. Historical Provider, MD   escitalopram (LEXAPRO) 10 MG tablet Take 10 mg by mouth daily Indications: Feeling Anxious    Historical Provider, MD   mirtazapine (REMERON) 15 MG tablet Take 15 mg by mouth nightly Indications: Depression    Historical Provider, MD   brimonidine (ALPHAGAN) 0.2 % ophthalmic solution  8/3/21   Historical Provider, MD   hydrocortisone 2.5 % cream Apply topically 3 times daily as needed Apply to affected area topically as needed for bee sting to right inner elbow TID    Historical Provider, MD   omeprazole 20 MG EC tablet Take 20 mg by mouth in the morning.     Historical Provider, MD   losartan (COZAAR) 50 MG tablet Take 1 tablet by mouth daily 8/11/21   Charanjit Childers MD   hyoscyamine (LEVSIN) 125 MCG/ML solution Take 0.125 mg by mouth every 4 hours as needed    Historical Provider, MD   promethazine (PHENERGAN) 25 MG tablet Take 25 mg by mouth every 6 hours as needed for Nausea    Historical Provider, MD   bisacodyl (DULCOLAX) 10 MG suppository Place 10 mg rectally daily as needed for Constipation    Historical Provider, MD   dextromethorphan (DELSYM) 30 MG/5ML extended release liquid Take 30 mg by mouth 2 times daily as needed for Cough    Historical Provider, MD   Sodium Phosphates (FLEET) 7-19 GM/118ML Place 1 enema rectally once as needed    Historical Provider, MD   latanoprost (XALATAN) 0.005 % ophthalmic solution Place 1 drop into the right eye nightly    Historical Provider, MD   magnesium hydroxide (MILK OF MAGNESIA CONCENTRATE) 2400 MG/10ML SUSP Take 2,400 mg by mouth daily as needed    Historical Provider, MD   polyethylene glycol (GLYCOLAX) powder Take 17 g by mouth daily    Historical Provider, MD   acetaminophen 650 MG TABS Take 650 mg by mouth every 4 hours as needed. Patient taking differently: Take 650 mg by mouth every 4 hours as needed 3 times a day for pain & every 4 hours PRN 12/23/14   Denia Trujillo MD       Allergies:  Patient has no known allergies. Social History:   TOBACCO:   reports that she has never smoked. She has never used smokeless tobacco.  ETOH:   reports no history of alcohol use. Family History:   History reviewed. No pertinent family history. Review of Systems:  Constitutional:negative  for fevers, and negative for chills.   Respiratory: negative for shortness of breath, negative for cough, and negative for wheezing  Cardiovascular: negative for chest pain, negative for palpitations, and negative for syncope  Gastrointestinal: negative for abdominal pain, positive for poor appetite,nausea,negative for vomiting, negative for diarrhea, negative for constipation, and negative for hematochezia or melena  Genitourinary: negative for dysuria, negative for urinary urgency, negative for urinary frequency, and negative for hematuria  Neurological: negative for unilateral weakness, numbness or tingling. All other systems were reviewed with the patient and are negative except as stated    Objective:    Vitals:   Temp: 97.6 °F (36.4 °C)  BP: 109/62  Resp: 16  Heart Rate: 70  SpO2: 94 %  -----------------------------------------------------------------  Exam:  GEN:    Awake, alert and oriented x3. EYES: blind  NECK: Supple. No lymphadenopathy. No carotid bruit  CVS:    regular rate and rhythm, no audible murmur  PULM:  CTA, no wheezes, rales or rhonchi, no acute respiratory distress  ABD:    Bowels sounds normal.  Abdomen is soft. No distention. no tenderness to palpation. EXT:   no edema bilaterally . No calf tenderness. NEURO: Moves all extremities. Motor and sensory are grossly intact  SKIN:  No rashes.   No skin lesions.    -----------------------------------------------------------------  Diagnostic Data:   All diagnostic data was reviewed  Lab Results   Component Value Date    WBC 23.3 (H) 11/27/2022    HGB 6.5 (LL) 11/27/2022    MCV 92.0 11/27/2022     11/27/2022      Lab Results   Component Value Date    GLUCOSE 79 11/27/2022    BUN 68 (H) 11/27/2022    CREATININE 2.25 (H) 11/27/2022     11/27/2022    K 5.2 11/27/2022    CALCIUM 8.3 (L) 11/27/2022     (H) 11/27/2022    CO2 20 11/27/2022     Lab Results   Component Value Date    WBCUA 50  11/26/2022    RBCUA 10 TO 20 11/26/2022    EPITHUA 2 TO 5 11/26/2022    LEUKOCYTESUR MODERATE (A) 11/26/2022    SPECGRAV 1.020 11/26/2022    GLUCOSEU NEGATIVE 11/26/2022    KETUA NEGATIVE 11/26/2022    PROTEINU 1+ (A) 11/26/2022    HGBUR 2+ (A) 11/26/2022    CASTUA NOT REPORTED 12/17/2014    CRYSTUA NOT REPORTED 12/17/2014    BACTERIA 3+ (A) 11/26/2022    YEAST NOT REPORTED 12/17/2014       Assessment:     Principal Problem:    UTI (urinary tract infection)  Active Problems:    Acute kidney injury (nontraumatic) (HCC)    Essential hypertension    Iron deficiency anemia    Urinary tract infection    Delusional disorder, mixed type ([facility]    Stage 3 chronic kidney disease ([facility]  Resolved Problems:    * No resolved hospital problems. *      Plan:     Problem List       * (Principal) UTI (urinary tract infection) - Primary        This patient requires inpatient admission because of urinary tract infection requiring iv antibiotics  Factors affecting the medical complexity of this patient include severe anemia,no obvious source found, acute kidney injury. Estimated length of stay is 2 days  Discussed patient's symptoms and data results including labs and imaging studies with the ER MD at time of admission  High risk drug monitoring: none      CORE MEASURES  DVT prophylaxis: SCD  Decubitus ulcer present on admission: No  CODE STATUS: DNR-CCA  Nutrition Status: poor  Physical therapy: Yes   Old Charts reviewed: Yes  EKG Reviewed:  Yes  Advance Directive Addressed: Yes    Inis Felty, MD , M.D.  11/27/2022  2:10 PM

## 2022-11-27 NOTE — PROGRESS NOTES
Patient incontinent of bladder, bloody mucus like discharge noted from vagina. Urine very odiferous. Bed linens and gown changed. Assessment completed and charted. VS stable, denies and needs at this time. Is drinking nectar thick water without difficulty. Call light within reach, all needs met as able. Will continue to monitor.

## 2022-11-27 NOTE — PROGRESS NOTES
Informed Dr Brittani Modi of critical lab value-hgb:6.5 and hematocrit: 20.6. Per Dr Evangelista Reagan one unit of blood and would like an OBGYN consult. Orders in and informed lab of type and screen as well as needing 1 unit of blood. Anayeli Martin verbalized  understanding.

## 2022-11-27 NOTE — PROGRESS NOTES
[facility]  Inpatient/Observation/Outpatient Rehabilitation    Date: 2022  Patient Name: Ml Miller       [] Inpatient Acute/Observation       []  Outpatient  : 1935       [] Pt no showed for scheduled appointment    [] Pt refused/declined therapy at this time due to:           [] Pt cancelled due to:  [] No Reason Given   [] Sick/ill   [x] Other: Hold OT evaluation at this time. Hemoglobin currently 6.5, not appropriate for transfers. Therapist/Assistant will attempt to see this patient, at our earliest opportunity.        Jeremy Tavarez, OT Date: 2022

## 2022-11-27 NOTE — PROGRESS NOTES
Patient A&O to self only. Continuously repeats the same questions over and over. Complains of urgency to urinate but when placed on commode is unable to go. No bladder distention observed. Will perform bladder scan if symptoms persist. Call light within reach, bed alarm on. Will continue to monitor.

## 2022-11-27 NOTE — CONSENT
Informed Consent for Blood Component Transfusion Note    I have discussed with the patient the rationale for blood component transfusion; its benefits in treating or preventing fatigue, organ damage, or death; and its risk which includes mild transfusion reactions, rare risk of blood borne infection, or more serious but rare reactions. I have discussed the alternatives to transfusion, including the risk and consequences of not receiving transfusion. The patient had an opportunity to ask questions and had agreed to proceed with transfusion of blood components.     Electronically signed by Shun Olivera MD on 11/27/22 at 9:04 AM EST

## 2022-11-28 ENCOUNTER — APPOINTMENT (OUTPATIENT)
Dept: ULTRASOUND IMAGING | Age: 87
DRG: 682 | End: 2022-11-28
Payer: MEDICARE

## 2022-11-28 PROBLEM — E43 SEVERE MALNUTRITION (HCC): Status: ACTIVE | Noted: 2022-11-28

## 2022-11-28 LAB
ABO/RH: NORMAL
ABSOLUTE EOS #: <0.03 K/UL (ref 0–0.44)
ABSOLUTE IMMATURE GRANULOCYTE: 0.21 K/UL (ref 0–0.3)
ABSOLUTE LYMPH #: 0.93 K/UL (ref 1.1–3.7)
ABSOLUTE MONO #: 0.81 K/UL (ref 0.1–1.2)
ALBUMIN SERPL-MCNC: 3.3 G/DL (ref 3.5–5.2)
ALBUMIN/GLOBULIN RATIO: 1.3 (ref 1–2.5)
ALP BLD-CCNC: 213 U/L (ref 35–104)
ALT SERPL-CCNC: 69 U/L (ref 5–33)
ANION GAP SERPL CALCULATED.3IONS-SCNC: 13 MMOL/L (ref 9–17)
ANTIBODY SCREEN: NEGATIVE
ARM BAND NUMBER: NORMAL
AST SERPL-CCNC: 124 U/L
BASOPHILS # BLD: 0 % (ref 0–2)
BASOPHILS ABSOLUTE: 0.05 K/UL (ref 0–0.2)
BILIRUB SERPL-MCNC: 0.3 MG/DL (ref 0.3–1.2)
BLD PROD TYP BPU: NORMAL
BLOOD BANK BLOOD PRODUCT EXPIRATION DATE: NORMAL
BLOOD BANK ISBT PRODUCT BLOOD TYPE: 6200
BLOOD BANK PRODUCT CODE: NORMAL
BLOOD BANK UNIT TYPE AND RH: NORMAL
BPU ID: NORMAL
BUN BLDV-MCNC: 54 MG/DL (ref 8–23)
BUN/CREAT BLD: 34 (ref 9–20)
CALCIUM SERPL-MCNC: 8.4 MG/DL (ref 8.6–10.4)
CHLORIDE BLD-SCNC: 114 MMOL/L (ref 98–107)
CO2: 16 MMOL/L (ref 20–31)
CREAT SERPL-MCNC: 1.58 MG/DL (ref 0.5–0.9)
CROSSMATCH RESULT: NORMAL
DISPENSE STATUS BLOOD BANK: NORMAL
EOSINOPHILS RELATIVE PERCENT: 0 % (ref 1–4)
EXPIRATION DATE: NORMAL
GFR SERPL CREATININE-BSD FRML MDRD: 31 ML/MIN/1.73M2
GLUCOSE BLD-MCNC: 77 MG/DL (ref 70–99)
HCT VFR BLD CALC: 29.6 % (ref 36.3–47.1)
HEMOGLOBIN: 9.5 G/DL (ref 11.9–15.1)
IMMATURE GRANULOCYTES: 1 %
LYMPHOCYTES # BLD: 5 % (ref 24–43)
MCH RBC QN AUTO: 30.4 PG (ref 25.2–33.5)
MCHC RBC AUTO-ENTMCNC: 32.1 G/DL (ref 28.4–34.8)
MCV RBC AUTO: 94.6 FL (ref 82.6–102.9)
MONOCYTES # BLD: 4 % (ref 3–12)
NRBC AUTOMATED: 0 PER 100 WBC
PDW BLD-RTO: 15.3 % (ref 11.8–14.4)
PLATELET # BLD: 246 K/UL (ref 138–453)
PMV BLD AUTO: 12.1 FL (ref 8.1–13.5)
POTASSIUM SERPL-SCNC: 4.5 MMOL/L (ref 3.7–5.3)
RBC # BLD: 3.13 M/UL (ref 3.95–5.11)
SEG NEUTROPHILS: 90 % (ref 36–65)
SEGMENTED NEUTROPHILS ABSOLUTE COUNT: 18.75 K/UL (ref 1.5–8.1)
SODIUM BLD-SCNC: 143 MMOL/L (ref 135–144)
TOTAL PROTEIN: 5.9 G/DL (ref 6.4–8.3)
TRANSFUSION STATUS: NORMAL
UNIT DIVISION: 0
UNIT ISSUE DATE/TIME: NORMAL
WBC # BLD: 20.8 K/UL (ref 3.5–11.3)

## 2022-11-28 PROCEDURE — 2580000003 HC RX 258: Performed by: FAMILY MEDICINE

## 2022-11-28 PROCEDURE — 6370000000 HC RX 637 (ALT 250 FOR IP): Performed by: FAMILY MEDICINE

## 2022-11-28 PROCEDURE — 1200000000 HC SEMI PRIVATE

## 2022-11-28 PROCEDURE — 97162 PT EVAL MOD COMPLEX 30 MIN: CPT

## 2022-11-28 PROCEDURE — 99231 SBSQ HOSP IP/OBS SF/LOW 25: CPT | Performed by: OBSTETRICS & GYNECOLOGY

## 2022-11-28 PROCEDURE — 36415 COLL VENOUS BLD VENIPUNCTURE: CPT

## 2022-11-28 PROCEDURE — 94761 N-INVAS EAR/PLS OXIMETRY MLT: CPT

## 2022-11-28 PROCEDURE — 97535 SELF CARE MNGMENT TRAINING: CPT

## 2022-11-28 PROCEDURE — 76856 US EXAM PELVIC COMPLETE: CPT

## 2022-11-28 PROCEDURE — 80053 COMPREHEN METABOLIC PANEL: CPT

## 2022-11-28 PROCEDURE — 97530 THERAPEUTIC ACTIVITIES: CPT

## 2022-11-28 PROCEDURE — 85025 COMPLETE CBC W/AUTO DIFF WBC: CPT

## 2022-11-28 PROCEDURE — 6360000002 HC RX W HCPCS: Performed by: NURSE PRACTITIONER

## 2022-11-28 PROCEDURE — 97116 GAIT TRAINING THERAPY: CPT

## 2022-11-28 PROCEDURE — 97166 OT EVAL MOD COMPLEX 45 MIN: CPT

## 2022-11-28 PROCEDURE — 2580000003 HC RX 258: Performed by: NURSE PRACTITIONER

## 2022-11-28 RX ADMIN — CLONAZEPAM 0.25 MG: 0.5 TABLET ORAL at 08:25

## 2022-11-28 RX ADMIN — PIPERACILLIN AND TAZOBACTAM 3375 MG: 3; .375 INJECTION, POWDER, FOR SOLUTION INTRAVENOUS at 20:20

## 2022-11-28 RX ADMIN — LOSARTAN POTASSIUM 50 MG: 50 TABLET, FILM COATED ORAL at 08:25

## 2022-11-28 RX ADMIN — SODIUM CHLORIDE: 9 INJECTION, SOLUTION INTRAVENOUS at 08:41

## 2022-11-28 RX ADMIN — SODIUM CHLORIDE: 9 INJECTION, SOLUTION INTRAVENOUS at 16:45

## 2022-11-28 RX ADMIN — BRIMONIDINE TARTRATE 1 DROP: 2 SOLUTION OPHTHALMIC at 08:27

## 2022-11-28 RX ADMIN — LATANOPROST 1 DROP: 50 SOLUTION/ DROPS OPHTHALMIC at 20:23

## 2022-11-28 RX ADMIN — ESCITALOPRAM 10 MG: 5 TABLET, FILM COATED ORAL at 08:25

## 2022-11-28 RX ADMIN — PIPERACILLIN AND TAZOBACTAM 3375 MG: 3; .375 INJECTION, POWDER, FOR SOLUTION INTRAVENOUS at 08:24

## 2022-11-28 RX ADMIN — POLYETHYLENE GLYCOL 3350 17 G: 17 POWDER, FOR SOLUTION ORAL at 08:26

## 2022-11-28 RX ADMIN — PANTOPRAZOLE SODIUM 40 MG: 40 TABLET, DELAYED RELEASE ORAL at 08:26

## 2022-11-28 RX ADMIN — BRIMONIDINE TARTRATE 1 DROP: 2 SOLUTION OPHTHALMIC at 20:22

## 2022-11-28 RX ADMIN — CLONAZEPAM 0.25 MG: 0.5 TABLET ORAL at 20:20

## 2022-11-28 RX ADMIN — MIRTAZAPINE 15 MG: 15 TABLET, FILM COATED ORAL at 20:21

## 2022-11-28 NOTE — PROGRESS NOTES
Call received from Dr. Hortensia Sauer stating that patient did in fact have a uterus and to call Dr. Maryan Teixeira with this information. Dr. Maryan Teixeira contacted, new orders received to consult with Dr. Ramya Maurer after pelvic ultrasound has been completed.

## 2022-11-28 NOTE — PROGRESS NOTES
Progress Note    SUBJECTIVE:    Patient seen for f/u of UTI (urinary tract infection). She resting in bed in no distress. No complaints     ROS:   Constitutional: negative  for fevers, and negative for chills. Respiratory: negative for shortness of breath, negative for cough, and negative for wheezing  Cardiovascular: negative for chest pain, and negative for palpitations  Gastrointestinal: negative for abdominal pain, negative for nausea,negative for vomiting, negative for diarrhea, and negative for constipation     All other systems were reviewed with the patient and are negative unless otherwise stated in HPI      OBJECTIVE:      Vitals:   Vitals:    11/28/22 0000   BP: 126/65   Pulse: 87   Resp: 18   Temp: 97.3 °F (36.3 °C)   SpO2: 94%     Weight: 96 lb 1.6 oz (43.6 kg)   Height: 5' 4\" (162.6 cm)     Weight  Wt Readings from Last 3 Encounters:   11/28/22 96 lb 1.6 oz (43.6 kg)     Body mass index is 16.5 kg/m². 24HR INTAKE/OUTPUT:      Intake/Output Summary (Last 24 hours) at 11/28/2022 0714  Last data filed at 11/28/2022 0022  Gross per 24 hour   Intake 1641.25 ml   Output --   Net 1641.25 ml     -----------------------------------------------------------------  Exam:    GEN:    Awake, alert and oriented x3. EYES:  EOMI, pupils equal   NECK: Supple. No lymphadenopathy. No carotid bruit  CVS:    regular rate and rhythm, no audible murmur  PULM:  CTA, no wheezes, rales or rhonchi, no acute respiratory distress  ABD:    Bowels sounds normal.  Abdomen is soft. No distention. no tenderness to palpation. EXT:   no edema bilaterally . No calf tenderness. NEURO: Moves all extremities. Motor and sensory are grossly intact  SKIN:  No rashes.   No skin lesions.    -----------------------------------------------------------------    Diagnostic Data:      Complete Blood Count:   Recent Labs     11/26/22  1104 11/27/22  0545 11/27/22  1630 11/28/22  0620   WBC 13.3* 23.3*  --  20.8*   RBC 2.94* 2.24*  -- 3.13*   HGB 8.5* 6.5* 8.7* 9.5*   HCT 27.4* 20.6* 27.5* 29.6*   MCV 93.2 92.0  --  94.6   MCH 28.9 29.0  --  30.4   MCHC 31.0 31.6  --  32.1   RDW 15.5* 15.6*  --  15.3*    233  --  246   MPV 11.7 12.2  --  12.1        Last 3 Blood Glucose:   Recent Labs     11/26/22  1104 11/27/22  0545 11/28/22  0620   GLUCOSE 122* 79 77        Comprehensive Metabolic Profile:   Recent Labs     11/26/22  1104 11/27/22  0545 11/28/22  0620   * 142 143   K 4.5 5.2 4.5    110* 114*   CO2 21 20 16*   BUN 64* 68* 54*   CREATININE 2.02* 2.25* 1.58*   GLUCOSE 122* 79 77   CALCIUM 9.5 8.3* 8.4*   PROT 6.6 5.5* 5.9*   LABALBU 4.0 3.1* 3.3*   BILITOT 0.2* 0.3 0.3   ALKPHOS 75 75 213*   AST 20 45* 124*   ALT 11 14 69*        Urinalysis:   Lab Results   Component Value Date/Time    NITRU NEGATIVE 11/26/2022 10:55 AM    COLORU Yellow 11/26/2022 10:55 AM    PHUR 6.0 11/26/2022 10:55 AM    WBCUA 50  11/26/2022 10:55 AM    RBCUA 10 TO 20 11/26/2022 10:55 AM    MUCUS NOT REPORTED 12/17/2014 04:00 PM    TRICHOMONAS NOT REPORTED 12/17/2014 04:00 PM    YEAST NOT REPORTED 12/17/2014 04:00 PM    BACTERIA 3+ 11/26/2022 10:55 AM    SPECGRAV 1.020 11/26/2022 10:55 AM    LEUKOCYTESUR MODERATE 11/26/2022 10:55 AM    UROBILINOGEN Normal 11/26/2022 10:55 AM    BILIRUBINUR NEGATIVE 11/26/2022 10:55 AM    GLUCOSEU NEGATIVE 11/26/2022 10:55 AM    KETUA NEGATIVE 11/26/2022 10:55 AM    AMORPHOUS TRACE 12/17/2014 04:00 PM       HgBA1c:  No results found for: LABA1C    Lactic Acid:   Lab Results   Component Value Date/Time    LACTA 2.0 11/26/2022 08:25 PM    LACTA 2.6 11/26/2022 05:20 PM        Troponin: No results for input(s): TROPONINI in the last 72 hours. CRP:  No results for input(s): CRP in the last 72 hours. Radiology/Imaging:  CT ABDOMEN PELVIS WO CONTRAST Additional Contrast? Oral   Preliminary Result   1. Mild rectosigmoid colitis. Moderate to large amount of stool in the   rectum is also noted.    2. Findings are suggestive of cystitis. Recommend correlation with   urinalysis. 3. Anasarca. XR CHEST PORTABLE   Final Result   Cardiomegaly with mild congestive changes and or atypical/viral pneumonia. No focal consolidation. US PELVIS COMPLETE    (Results Pending)         ASSESSMENT / PLAN:  UTI (urinary tract infection)  Continue current therapy   IV fluids  IV Zosyn  Cultures pending  Essential hypertension  Continue losartan  SKYLER on CKD  Trend labs  IV fluids  Iron deficiency anemia  Appreciate GI-stool occult positive  Appreciate OB/GYN-vaginal discharge/vaginal 3 bleeding her blood pressure  CT abdomen and pelvis 11/27/2022-cystitis. Calcified uterine fibroid.   Pelvic ultrasound today  Nutrition status:   severe malnutrition  Dietician consult initiated  Hospital Prophylaxis:   DVT: none due to bleeding    Stress Ulcer: PPI   High risk medications: none   Disposition:    Discharge plan is [address] [place], [place] [address]GISSELLE - CNP , GISSELLE, NP-C  Hospitalist Medicine        11/28/2022, 7:14 AM

## 2022-11-28 NOTE — PROGRESS NOTES
Patient lying in bed with eyes closed. Transferred to bedside commode with max assist x 2. Cooperative with assessment and VS. VS stable, bed alarm on, all needs met as able.

## 2022-11-28 NOTE — PROGRESS NOTES
Entered patient's room for afternoon vital signs and head to toe reassessment. Patient resting in the chair at this time. A&O x 1 (person; disoriented to place, time and situation), irritable, and follows commands. Patient denies pain at this time. Vital signs and head to toe reassessment completed at this time, see flowsheets for more details. BP elevated at this time, BP rechecked with a manual cuff, increased BP notified to Leroy Beaver CNP. Patient transferred to the [facility] at this time with a second person for assistance. Patient transferred back to the chair at this time. Writer offered water and a snack to the patient, whic the patient refused at this time. Patient denies no more needs at this time. Call light within reach. Chair alarm on. Chair/bed wheels locked. Bed in lowest position. Will continue to monitor patient.

## 2022-11-28 NOTE — PROGRESS NOTES
Comprehensive Nutrition Assessment    Type and Reason for Visit:  Initial    Nutrition Recommendations/Plan:   Continue current diet. Start 4 oz chocolate 2 alex HN TID with meals. Malnutrition Assessment:  Malnutrition Status:  Severe malnutrition (11/28/22 1117)    Context:  Acute Illness     Findings of the 6 clinical characteristics of malnutrition:  Energy Intake:  50% or less of estimated energy requirements for 5 or more days  Weight Loss:  Greater than 2% over 1 week (4.6%  since admission)     Body Fat Loss:  Mild body fat loss Orbital, Fat Overlying Ribs   Muscle Mass Loss:  Mild muscle mass loss Temples (temporalis), Clavicles (pectoralis & deltoids)  Fluid Accumulation:  No significant fluid accumulation     Strength:  Not Performed      Nutrition Assessment:    Severe malnutrition r/t inadequate oral intakes aeb 4.6% weight loss since admission, PO 1-25%-Hx decreased PO, BMI 16.5, poor PO-nurse reports Pt not eating much, but is drinking fluids. She is on remeron. Pt admitted with UTI and vaginal bleeding. + occult stool. Had 1 U PRBC's and Hgb improved from 6.5 to 9.5. Pt on Boost TID at [facility]. Pt stated she liked chocolate, STNA confirmed. STNA at [facility] reports Pt \"picks at meals. \" Paulo Chavez did not know of any weight loss, thought she had been stable since she started at [facility] a couple of months ago. Pt is blind with dementia. Nutrition Related Findings:    appears malnourished Wound Type: None       Current Nutrition Intake & Therapies:    Average Meal Intake: 1-25%  Average Supplements Intake: None Ordered  ADULT DIET; Dysphagia - Soft and Bite Sized; Mildly Thick (Nectar)    Anthropometric Measures:  Height: 5' 4\" (162.6 cm)  Ideal Body Weight (IBW): 120 lbs (55 kg)    Admission Body Weight: 100 lb 8 oz (45.6 kg)  Current Body Weight: 96 lb 1.6 oz (43.6 kg), 80.1 % IBW.  Weight Source: Bed Scale  Current BMI (kg/m2): 16.5  Usual Body Weight:  (limited historical weights)     Weight Adjustment For: No Adjustment                 BMI Categories: Underweight (BMI less than 22) age over 72    Estimated Daily Nutrient Needs:  Energy Requirements Based On: Kcal/kg  Weight Used for Energy Requirements: Current  Energy (kcal/day): 3993-6315 (35-38/kg)  Weight Used for Protein Requirements: Current  Protein (g/day): 61-74g (1.4-1.7g/kg)  Method Used for Fluid Requirements: 1 ml/kcal  Fluid (ml/day): 1500 ml    Nutrition Diagnosis:   Severe malnutrition related to inadequate protein-energy intake as evidenced by intake 0-25%, mild loss of subcutaneous fat, mild muscle loss, weight loss greater than or equal to 2% in 1 week    Nutrition Interventions:   Food and/or Nutrient Delivery: Continue Current Diet, Start Oral Nutrition Supplement  Nutrition Education/Counseling: No recommendation at this time  Coordination of Nutrition Care: Continue to monitor while inpatient  Plan of Care discussed with: nurse    Goals:     Goals: PO intake 75% or greater     Recent Labs     11/26/22  1104 11/27/22  0545 11/28/22  0620   * 142 143   K 4.5 5.2 4.5    110* 114*   CO2 21 20 16*   BUN 64* 68* 54*   CREATININE 2.02* 2.25* 1.58*   GLUCOSE 122* 79 77   ALT 11 14 69*   ALKPHOS 75 75 213*      Lab Results   Component Value Date/Time    LABALBU 3.3 11/28/2022 06:20 AM       Nutrition Monitoring and Evaluation:   Behavioral-Environmental Outcomes: Knowledge or Skill  Food/Nutrient Intake Outcomes: Food and Nutrient Intake, Supplement Intake  Physical Signs/Symptoms Outcomes: Biochemical Data, Weight, Nutrition Focused Physical Findings    Discharge Planning:    Continue current diet, Continue Oral Nutrition Supplement     Reji Suh RD, LD  Contact: [phone]

## 2022-11-28 NOTE — PROGRESS NOTES
Physical Therapy  Facility/Department: [facility] AT THE [facility] MED SURG  Daily Treatment Note  NAME: Nancy Reece  : 1935  MRN: [id number]    Date of Service: 2022    Discharge Recommendations:  Continue to assess pending progress      Patient Diagnosis(es): The primary encounter diagnosis was Urinary tract infection with hematuria, site unspecified. A diagnosis of Vaginal bleeding was also pertinent to this visit. Assessment   Assessment: Patient agitated throughout treatment requesting her clothes on, refusing to keep footrest up while seated due to patients decreased cognitive state. Attempted seated BLE there ex but patient unable to follow commands and became agitated. Transfers Min/Mod A x2. Gait with no AD and [place] Min/Mod A x2. Activity Tolerance: Treatment limited secondary to decreased cognition     Plan    Physcial Therapy Plan  General Plan: 2 times a day 7 days a week (1x/day on weekends and holidays)  Current Treatment Recommendations: Strengthening;ROM;Balance training;Functional mobility training;Transfer training;Gait training; Endurance training;Neuromuscular re-education;Home exercise program;Safety education & training;Patient/Caregiver education & training; Therapeutic activities     Restrictions  Restrictions/Precautions  Restrictions/Precautions: Fall Risk, General Precautions     Subjective    Subjective  Subjective: Pt in recliner upon arrival and was agreeable to therapy. Pain: no c/o pain at this time. Orientation  Overall Orientation Status: Impaired  Orientation Level: Oriented to person  Cognition  Overall Cognitive Status: Exceptions  Cognition Comment: hx of dementia, repeatitive, able to follow simple commands     Objective   Bed Mobility Training  Bed Mobility Training: No  Balance  Sitting: Intact  Standing: High guard  Transfer Training  Transfer Training: Yes  Overall Level of Assistance: Minimum assistance; Moderate assistance;Assist X2  Interventions: Tactile cues; Verbal cues  Sit to Stand: Minimum assistance; Moderate assistance;Assist X2  Stand to Sit: Minimum assistance; Moderate assistance;Assist X2  Stand Pivot Transfers: Minimum assistance; Moderate assistance;Assist X2  Toilet Transfer: Minimum assistance; Moderate assistance;Assist X2  Gait Training  Gait Training: Yes  Gait  Overall Level of Assistance: Minimum assistance; Moderate assistance;Assist X2  Interventions: Tactile cues; Verbal cues  Speed/Lucero: Shuffled  Distance (ft): 10 Feet  Assistive Device: Other (comment) (No AD, [place])  Neuromuscular Education  Neuromuscular Education: No  PT Exercises  Exercise Treatment: Attempted seated BLE there ex but patient unable to follow commands and became agitated. Safety Devices  Type of Devices: All fall risk precautions in place;Call light within reach; Chair alarm in place;Nurse notified; Patient at risk for falls; Left in chair       Goals  Short Term Goals  Time Frame for Short Term Goals: 20 days  Short Term Goal 1: Pt will be educated on her POC  Short Term Goal 2: Pt will perform all transfers CGA HHA  Short Term Goal 3: Pt will ambulate >/=50 feet HHA CGA in order to use the bathroom  Short Term Goal 4: Pt will increase dynamic standing balance to Fair + in order to reduce fall risk    Education  Patient Education  Education Given To: Patient  Education Provided Comments: Educated patient on sequencing with transfers  Education Method: Verbal  Barriers to Learning: Vision;Cognition  Education Outcome: Unable to verbalize;Continued education needed    Therapy Time   Individual Concurrent Group Co-treatment   Time In 1425         Time Out 1458         Minutes [address] [place] [address]

## 2022-11-28 NOTE — PROGRESS NOTES
Physical Therapy  Facility/Department: [facility] AT THE [facility] MED SURG  Physical Therapy Initial Assessment    Name: Sandy Abdi  : 1935  MRN: [id number]  Date of Service: 2022    Discharge Recommendations:  Continue to assess pending progress          Patient Diagnosis(es): The primary encounter diagnosis was Urinary tract infection with hematuria, site unspecified. A diagnosis of Vaginal bleeding was also pertinent to this visit. Past Medical History:  has a past medical history of Anemia, Anorexia, Anxiety disorder, Blind, Cardiomegaly, Chronic kidney disease, Dementia ([address] Depression, Hypertension, and OCD (obsessive compulsive disorder). Past Surgical History:  has a past surgical history that includes Cataract removal.    Assessment   Body Structures, Functions, Activity Limitations Requiring Skilled Therapeutic Intervention: Decreased functional mobility ; Decreased balance;Decreased strength;Decreased safe awareness;Decreased high-level IADLs;Decreased endurance  Assessment: Pt is an 80year old female that was admitted for UTI and amenia. Pt presents with general weakness, decreased ambulation tolerance, reduced safety awareness and reduced dynamic standing balance.  Pt will benefit from skilled PT in order to address these deficits  Treatment Diagnosis: general weakness  Therapy Prognosis: Fair  Decision Making: Medium Complexity  Requires PT Follow-Up: Yes  Activity Tolerance  Activity Tolerance: Patient tolerated evaluation without incident     Plan   Physcial Therapy Plan  General Plan: 2 times a day 7 days a week  Current Treatment Recommendations: Strengthening, ROM, Balance training, Functional mobility training, Transfer training, Gait training, Endurance training, Neuromuscular re-education, Home exercise program, Safety education & training, Patient/Caregiver education & training, Therapeutic activities  Safety Devices  Type of Devices: Left in chair, Chair alarm in place, Call light within reach, Patient at risk for falls     Restrictions  Restrictions/Precautions  Restrictions/Precautions: Fall Risk, General Precautions     Subjective   General  Chart Reviewed: Yes  Patient assessed for rehabilitation services?: Yes  Response To Previous Treatment: Not applicable  Family / Caregiver Present: No  Referring Practitioner: Dr. Adam Nails  Referral Date : 11/28/22  Diagnosis: anemia/UTI  Subjective  Subjective: no subjective information given         Social/Functional History  Social/Functional History  Type of Home: Facility  Home Equipment: Wheelchair-manual  ADL Assistance: Needs assistance  Homemaking Assistance: Needs assistance  Ambulation Assistance: Needs assistance  Transfer Assistance: Needs assistance  Additional Comments: Pt. lives at [facility] on dementia unit, requires assistance with ADL's, uses handheld assist for ambulation d/t being blind. Cognition   Orientation  Overall Orientation Status: Impaired     Objective   Heart Rate: 75  Heart Rate Source: Monitor; Apical  BP: (!) 166/74  BP Location: Left upper arm  BP Method: Automatic  Patient Position: Sitting  MAP (Calculated): 105  Resp: 18  SpO2: 90 %  O2 Device: None (Room air)        Gross Assessment  AROM: Generally decreased, functional  Strength: Generally decreased, functional        Balance  Sitting: Intact  Standing: High guard  Transfer Training  Transfer Training: Yes  Overall Level of Assistance: Moderate assistance;Minimum assistance;Assist X2  Sit to Stand: Minimum assistance; Moderate assistance;Assist X2  Stand to Sit: Minimum assistance; Moderate assistance;Assist X2  Stand Pivot Transfers: Minimum assistance; Moderate assistance;Assist X2  Toilet Transfer: Minimum assistance; Moderate assistance;Assist X2  Gait Training  Gait Training: Yes  Gait  Overall Level of Assistance: Minimum assistance; Moderate assistance;Assist X2  Interventions: Manual cues; Verbal cues; Safety awareness training; Tactile cues  Speed/Lucero: Shuffled  Distance (ft): 5 Feet  Assistive Device:  (HHA)       AM-PAC Score     AM-PAC Inpatient Mobility without Stair Climbing Raw Score : 15 (11/28/22 1004)  AM-PAC Inpatient without Stair Climbing T-Scale Score : 43.03 (11/28/22 1004)  Mobility Inpatient CMS 0-100% Score: 47.43 (11/28/22 1004)  Mobility Inpatient without Stair CMS G-Code Modifier : CK (11/28/22 1004)     Goals  Short Term Goals  Time Frame for Short Term Goals: 20 days  Short Term Goal 1: Pt will be educated on her POC  Short Term Goal 2: Pt will perform all transfers CGA HHA  Short Term Goal 3: Pt will ambulate >/=50 feet HHA CGA in order to use the bathroom  Short Term Goal 4: Pt will increase dynamic standing balance to Fair + in order to reduce fall risk       Education  Patient Education  Education Given To: Patient  Education Provided: Role of Therapy;Plan of Care      Therapy Time   Individual Concurrent Group Co-treatment   Time In 0937         Time Out 0956         Minutes CMatt Pereira 33, PT

## 2022-11-28 NOTE — PLAN OF CARE
Problem: Discharge Planning  Goal: Discharge to home or other facility with appropriate resources  Outcome: Progressing    Discharge plan is return to [facility] upon being medically cleared. LSW providing assistance for discharge needs. Will continue to monitor. Problem: Skin/Tissue Integrity  Goal: Absence of new skin breakdown  Description: 1. Monitor for areas of redness and/or skin breakdown  2. Assess vascular access sites hourly  Outcome: Progressing   Patient turn/repositioned every 2 hours. Patient encouraged to ambulate and use restroom every 2 hours. Patient assessed for wet bottoms as needed. Patient assessed for skin breakdown. Heels floated while in chair and bed. Problem: Safety - Adult  Goal: Free from fall injury  Outcome: Progressing   Patient's bed in lowest position. Bed/chair wheel locked. Call light within reach. Non-skid socks worn. Bedside table within reach. Bedrails up x 2-3. Patient is up with two people for assistance. Bed/chair alarm on. Will continue to monitor. Problem: Pain  Goal: Verbalizes/displays adequate comfort level or baseline comfort level  Outcome: Progressing   Patient encouraged to let staff know when pain is present and to request pain medication when needing it. Non-pharmaceutical measures encouraged, rest and repositioning. Problem: Infection - Adult  Goal: Absence of infection at discharge  Outcome: Progressing   Monitor patient's temperature, WBC count, urine output, and urine assessments.

## 2022-11-28 NOTE — PROGRESS NOTES
Occupational Therapy  Facility/Department: [facility] AT THE [facility] MED SURG  Occupational Therapy Initial Assessment    Name: Georgia Messer  : 1935  MRN: [id number]  Date of Service: 2022    Discharge Recommendations:  Continue to assess pending progress, Long Term Care with OT        Patient Diagnosis(es): The primary encounter diagnosis was Urinary tract infection with hematuria, site unspecified. A diagnosis of Vaginal bleeding was also pertinent to this visit. Past Medical History:  has a past medical history of Anemia, Anorexia, Anxiety disorder, Blind, Cardiomegaly, Chronic kidney disease, Dementia ([facility] [address] Depression, Hypertension, and OCD (obsessive compulsive disorder). Past Surgical History:  has a past surgical history that includes Cataract removal.    Treatment Diagnosis: Generalized Weakness      Assessment   Performance deficits / Impairments: Decreased functional mobility ; Decreased ADL status; Decreased safe awareness;Decreased balance;Decreased cognition  Treatment Diagnosis: Generalized Weakness  Prognosis: Good  Decision Making: Medium Complexity  REQUIRES OT FOLLOW-UP: Yes  Activity Tolerance  Activity Tolerance: Patient Tolerated treatment well        Plan   Occupational Therapy Plan  Times Per Week: 7x/wk  Times Per Day:  Once a day  Current Treatment Recommendations: Strengthening, Functional mobility training, Safety education & training, Self-Care / ADL     Restrictions  Restrictions/Precautions  Restrictions/Precautions: Fall Risk, General Precautions    Subjective   General  Chart Reviewed: Yes  Patient assessed for rehabilitation services?: Yes  Diagnosis: UTI, Anemia, Vaginal Bleeding  General Comment  Comments: Pt. seated in bedside chair upon arrival, pleasantly confused, agreeble to participate with OT eval.     Social/Functional History  Social/Functional History  Type of Home: Facility  Home Equipment: [place]  ADL Assistance: Needs assistance  Homemaking Assistance: Needs assistance  Ambulation Assistance: Needs assistance  Transfer Assistance: Needs assistance  Additional Comments: Pt. lives at [facility] on dementia unit, requires assistance with ADL's, uses handheld assist for ambulation d/t being blind. Pt. Unable to provide history d/t dementia. Objective   Heart Rate: 75  Heart Rate Source: Monitor; Apical  BP: (!) 166/74  BP Location: Left upper arm  BP Method: Automatic  Patient Position: Sitting  MAP (Calculated): 105  Resp: 18  SpO2: 90 %  O2 Device: None (Room air)          Safety Devices  Type of Devices: Left in chair;Chair alarm in place;Call light within reach; Patient at risk for falls  Balance  Sitting: Intact  Standing: High guard  Transfer Training  Transfer Training: Yes  Overall Level of Assistance: Moderate assistance;Minimum assistance;Assist X2  Sit to Stand: Minimum assistance; Moderate assistance;Assist X2  Stand to Sit: Minimum assistance; Moderate assistance;Assist X2  Stand Pivot Transfers: Minimum assistance; Moderate assistance;Assist X2  Toilet Transfer: Minimum assistance; Moderate assistance;Assist X2  Gait Training  Gait Training: Yes  Gait  Overall Level of Assistance: Minimum assistance; Moderate assistance;Assist X2  Interventions: Manual cues; Verbal cues; Safety awareness training; Tactile cues  Speed/Lucero: Shuffled  Distance (ft): 5 Feet  Assistive Device:  (HHA)        ADL  Feeding: Stand by assistance  Grooming: Minimal assistance  UE Bathing: Minimal assistance  LE Bathing: Moderate assistance;Maximum assistance  UE Dressing: Minimal assistance; Moderate assistance  LE Dressing: Maximum assistance  Toileting: Maximum assistance     Activity Tolerance  Activity Tolerance: Patient tolerated evaluation without incident        Vision  Vision: Impaired  Vision Exceptions: Legally blind  Hearing  Hearing: Within functional limits  Cognition  Overall Cognitive Status: Exceptions  Cognition Comment: hx of dementia, repeatitive, able to follow simple commands  Orientation  Overall Orientation Status: Impaired  Orientation Level: Oriented to person        Education Given To: Patient  Education Provided: Plan of Care;Transfer Training  Education Method: Demonstration;Verbal  Barriers to Learning: Cognition  Education Outcome: Continued education needed  LUE AROM (degrees)  LUE AROM : WFL  RUE AROM (degrees)  RUE AROM : WFL        Goals  Short Term Goals  Time Frame for Short Term Goals: 20 visits  Short Term Goal 1: Pt. will tolerate 15 mins of BUE ther ex/ther act to increase overall strength/activity tolerance for functional tasks. Short Term Goal 2: Pt. will engage in simple ADL tasks with cueing PRN to increase (I) with self cares. Short Term Goal 3: Pt. will complete transfers to bedside commode or toilet/grab bars with Min A with reduced risk for falls.        Therapy Time   Individual Concurrent Group Co-treatment   Time In 0930         Time Out 0947         Minutes [address] Mandie Hines [place], [place]

## 2022-11-28 NOTE — PROGRESS NOTES
Entered patient's room for morning vital signs and head to toe assessment. Patient resting in the bed at this time. A&O x 1 (person; disoriented to place, time and situation), irritable, but follows commands. Patient reoriented at this time. Patient denies pain at this time. Vital signs and head to toe assessment completed at this time, see flowsheets for more details. Patient transferred to the the chair at this time with two people assisting. Patient denies no more needs at this time. Call light within reach. Chair alarm on. Bed/chair wheels locked. Bed in lowest position. Will continue to monitor patient.

## 2022-11-28 NOTE — PROGRESS NOTES
Piperacillin-Tazobactam Extended Interval Interchange    The following ordered dose of Piperacillin-Tazobactam has been changed to optimize its pharmacodynamic profile as approved by the Patient Care Review Committee. Ordered Dose    _X_ 3.375 gm IV q6 or 8hrs (30 minute infusion)      __ 4.5gm IV q6 or 8hrs  (30 minute infusion)      __ 2.25gm IV q6 or 8hrs (30 minute infusion)      New Dose    CrCl  ? 20ml/min    __ 3.375gm IV q8hrs  (4-hour infusion)      CrCl < 20ml/min     _X_ 3.375gm IV q12hrs  (4-hour infusion)      Pharmacists should be contacted for issues concerning drug compatibility with multiple IV medications. All doses will be prepared using 50ml D5W to be infused over 4-hours at a rate of 12.5ml/hr.     Thank Poonam Trujillo, RPH11/28/14796:20 AM

## 2022-11-28 NOTE — PROGRESS NOTES
Patient asked to use commode. Placed on commode and had medium loose, mucus like stool. Assessment and vs completed and charted. Patient denies any other needs or concerns at this time. Assisted back to bed with maximum assist. Call light placed in reach, [facility] elevated, will continue to monitor.

## 2022-11-28 NOTE — PROGRESS NOTES
SW met with pt this morning to complete assessment. Pt is alert and answers questions appropriately but difficult to determine if she has some confusion as well. Pt is a 80year old female admitted for UTI. Pt lives at [facility]. Pt repotrs that she uses a wheelchair to get around in the facility. Pt has staff to assist her with whatever she may need. [facility] will assist with transport if needed. Pt is a DNR CCA and follows with Dr Humble Mclaughlin as PCP. Pt reports that her nephew Ceferino Negro is her decision maker if needed. Pt states that her medications are affordable with her insurance. Pt plans to return to [facility] when she is medically stable for discharge. Pt identifies no additional needs or concerns at this time. SW will follow and assist with return to [facility] when pt is ready for discharge.  Ming Osuna MSW ATIYAW 11/28/2022

## 2022-11-29 VITALS
WEIGHT: 96.3 LBS | SYSTOLIC BLOOD PRESSURE: 158 MMHG | OXYGEN SATURATION: 90 % | TEMPERATURE: 97.6 F | DIASTOLIC BLOOD PRESSURE: 58 MMHG | RESPIRATION RATE: 16 BRPM | BODY MASS INDEX: 16.44 KG/M2 | HEART RATE: 80 BPM | HEIGHT: 64 IN

## 2022-11-29 LAB
ABSOLUTE EOS #: 0.17 K/UL (ref 0–0.44)
ABSOLUTE IMMATURE GRANULOCYTE: 0 K/UL (ref 0–0.3)
ABSOLUTE LYMPH #: 1 K/UL (ref 1.1–3.7)
ABSOLUTE MONO #: 0.17 K/UL (ref 0.1–1.2)
ALBUMIN SERPL-MCNC: 3.1 G/DL (ref 3.5–5.2)
ALBUMIN/GLOBULIN RATIO: 1.2 (ref 1–2.5)
ALP BLD-CCNC: 156 U/L (ref 35–104)
ALT SERPL-CCNC: 49 U/L (ref 5–33)
ANION GAP SERPL CALCULATED.3IONS-SCNC: 13 MMOL/L (ref 9–17)
AST SERPL-CCNC: 59 U/L
BASOPHILS # BLD: 0 % (ref 0–2)
BASOPHILS ABSOLUTE: 0 K/UL (ref 0–0.2)
BILIRUB SERPL-MCNC: 0.3 MG/DL (ref 0.3–1.2)
BUN BLDV-MCNC: 38 MG/DL (ref 8–23)
BUN/CREAT BLD: 28 (ref 9–20)
CALCIUM SERPL-MCNC: 8.4 MG/DL (ref 8.6–10.4)
CHLORIDE BLD-SCNC: 117 MMOL/L (ref 98–107)
CO2: 14 MMOL/L (ref 20–31)
CREAT SERPL-MCNC: 1.36 MG/DL (ref 0.5–0.9)
EOSINOPHILS RELATIVE PERCENT: 1 % (ref 1–4)
GFR SERPL CREATININE-BSD FRML MDRD: 38 ML/MIN/1.73M2
GLUCOSE BLD-MCNC: 81 MG/DL (ref 70–99)
HCT VFR BLD CALC: 29.5 % (ref 36.3–47.1)
HEMOGLOBIN: 9.2 G/DL (ref 11.9–15.1)
IMMATURE GRANULOCYTES: 0 %
LYMPHOCYTES # BLD: 6 % (ref 24–43)
MCH RBC QN AUTO: 30.1 PG (ref 25.2–33.5)
MCHC RBC AUTO-ENTMCNC: 31.2 G/DL (ref 28.4–34.8)
MCV RBC AUTO: 96.4 FL (ref 82.6–102.9)
MONOCYTES # BLD: 1 % (ref 3–12)
MORPHOLOGY: ABNORMAL
NRBC AUTOMATED: 0.1 PER 100 WBC
PDW BLD-RTO: 15.2 % (ref 11.8–14.4)
PLATELET # BLD: 235 K/UL (ref 138–453)
PMV BLD AUTO: 12.1 FL (ref 8.1–13.5)
POTASSIUM SERPL-SCNC: 4.1 MMOL/L (ref 3.7–5.3)
RBC # BLD: 3.06 M/UL (ref 3.95–5.11)
SARS-COV-2, RAPID: NOT DETECTED
SEG NEUTROPHILS: 92 % (ref 36–65)
SEGMENTED NEUTROPHILS ABSOLUTE COUNT: 15.36 K/UL (ref 1.5–8.1)
SODIUM BLD-SCNC: 144 MMOL/L (ref 135–144)
SPECIMEN DESCRIPTION: NORMAL
TOTAL PROTEIN: 5.7 G/DL (ref 6.4–8.3)
WBC # BLD: 16.7 K/UL (ref 3.5–11.3)

## 2022-11-29 PROCEDURE — 2580000003 HC RX 258: Performed by: FAMILY MEDICINE

## 2022-11-29 PROCEDURE — 36415 COLL VENOUS BLD VENIPUNCTURE: CPT

## 2022-11-29 PROCEDURE — 85025 COMPLETE CBC W/AUTO DIFF WBC: CPT

## 2022-11-29 PROCEDURE — 2580000003 HC RX 258: Performed by: NURSE PRACTITIONER

## 2022-11-29 PROCEDURE — 6360000002 HC RX W HCPCS: Performed by: NURSE PRACTITIONER

## 2022-11-29 PROCEDURE — 80053 COMPREHEN METABOLIC PANEL: CPT

## 2022-11-29 PROCEDURE — C9803 HOPD COVID-19 SPEC COLLECT: HCPCS

## 2022-11-29 PROCEDURE — 6370000000 HC RX 637 (ALT 250 FOR IP): Performed by: FAMILY MEDICINE

## 2022-11-29 PROCEDURE — 97535 SELF CARE MNGMENT TRAINING: CPT

## 2022-11-29 PROCEDURE — 97110 THERAPEUTIC EXERCISES: CPT

## 2022-11-29 PROCEDURE — 87635 SARS-COV-2 COVID-19 AMP PRB: CPT

## 2022-11-29 PROCEDURE — 94761 N-INVAS EAR/PLS OXIMETRY MLT: CPT

## 2022-11-29 RX ORDER — CEPHALEXIN 500 MG/1
500 CAPSULE ORAL 3 TIMES DAILY
Qty: 21 CAPSULE | Refills: 0 | DISCHARGE
Start: 2022-11-29 | End: 2022-12-06

## 2022-11-29 RX ADMIN — PIPERACILLIN AND TAZOBACTAM 3375 MG: 3; .375 INJECTION, POWDER, FOR SOLUTION INTRAVENOUS at 08:01

## 2022-11-29 RX ADMIN — MIRTAZAPINE 15 MG: 15 TABLET, FILM COATED ORAL at 20:12

## 2022-11-29 RX ADMIN — CLONAZEPAM 0.25 MG: 0.5 TABLET ORAL at 20:12

## 2022-11-29 RX ADMIN — CLONAZEPAM 0.25 MG: 0.5 TABLET ORAL at 11:00

## 2022-11-29 RX ADMIN — BRIMONIDINE TARTRATE 1 DROP: 2 SOLUTION OPHTHALMIC at 20:12

## 2022-11-29 RX ADMIN — LATANOPROST 1 DROP: 50 SOLUTION/ DROPS OPHTHALMIC at 20:12

## 2022-11-29 RX ADMIN — SODIUM CHLORIDE: 9 INJECTION, SOLUTION INTRAVENOUS at 00:31

## 2022-11-29 RX ADMIN — POLYETHYLENE GLYCOL 3350 17 G: 17 POWDER, FOR SOLUTION ORAL at 11:06

## 2022-11-29 RX ADMIN — LOSARTAN POTASSIUM 50 MG: 50 TABLET, FILM COATED ORAL at 11:01

## 2022-11-29 RX ADMIN — PIPERACILLIN AND TAZOBACTAM 3375 MG: 3; .375 INJECTION, POWDER, FOR SOLUTION INTRAVENOUS at 20:11

## 2022-11-29 RX ADMIN — ESCITALOPRAM 10 MG: 5 TABLET, FILM COATED ORAL at 11:02

## 2022-11-29 NOTE — PROGRESS NOTES
Physical Therapy  [facility]  Inpatient/Observation/Outpatient Rehabilitation    Date: 2022  Patient Name: Denzel Glaser       [] Inpatient Acute/Observation       []  Outpatient  : 1935       [] Pt no showed for scheduled appointment    [x] Pt refused/declined therapy at this time due to:           [] Pt cancelled due to:  [] No Reason Given   [] Sick/ill   [] Other:    Therapist/Assistant will attempt to see this patient, at our earliest opportunity.        Mariam Benitez, LIZZIE Date: 2022

## 2022-11-29 NOTE — PROGRESS NOTES
Acknowledge pt return to [facility] this date. Gamaliel notified of ambulance time for midnight and paperwork faxed to them. SHARRON called and spoke with pt's nephew Israel Hodges and informed of discharge and transfer time and he is in agreement. Second notice IMM given to him by phone. Nursing given discharge packet and notified of ambulance time.  [facility] MSW ATIYAW 11/29/2022

## 2022-11-29 NOTE — PROGRESS NOTES
Physical Therapy  Facility/Department: [facility] AT THE [facility] MED SURG  Daily Treatment Note  NAME: Mally Burger  : 1935  MRN: [id number]  Date of Service: 2022  Discharge Recommendations:  Continue to assess pending progress   Patient Diagnosis(es): The primary encounter diagnosis was Urinary tract infection with hematuria, site unspecified. A diagnosis of Vaginal bleeding was also pertinent to this visit. Assessment   Assessment: Pt. declines getting up and ambulating at this time, was agreeable to reclined and seated exercises but noted resistance with AAROM x20 B LE. Pt. confused and only alert to self, pt. is also blind. Activity Tolerance: Patient tolerated treatment well   Plan    Physcial Therapy Plan  General Plan: 2 times a day 7 days a week  Current Treatment Recommendations: Strengthening;ROM;Balance training;Functional mobility training;Transfer training;Gait training; Endurance training;Neuromuscular re-education;Home exercise program;Safety education & training;Patient/Caregiver education & training; Therapeutic activities   Restrictions  Restrictions/Precautions  Restrictions/Precautions: Fall Risk, General Precautions   Subjective    Subjective  Subjective: Pt in recliner upon arrival and was agreeable to therapy. Pain: no c/o pain at this time. Orientation  Overall Orientation Status: Impaired   Objective   Bed Mobility Training  Bed Mobility Training: No  Transfer Training  Transfer Training: No  Toilet Transfer: Moderate assistance;Assist X2 (Mod A x 2 from chair to bedside commode.)  Gait Training  Gait Training: No  PT Exercises  Exercise Treatment: Reclined and seated exercises B LE x20 with AAROM, noted resistance with exercises. Safety Devices  Type of Devices: Call light within reach; Left in chair;Chair alarm in place;Nurse notified   Goals  Short Term Goals  Time Frame for Short Term Goals: 20 days  Short Term Goal 1: Pt will be educated on her POC  Short Term Goal 2: Pt will perform all transfers CGA HHA  Short Term Goal 3: Pt will ambulate >/=50 feet HHA CGA in order to use the bathroom  Short Term Goal 4: Pt will increase dynamic standing balance to Fair + in order to reduce fall risk  Education  Patient Education  Education Given To: Patient  Education Provided: Role of Therapy;Plan of Care  Education Provided Comments: Educated patient on reason for therapy with poor understanding  Education Method: Verbal  Barriers to Learning: Vision;Cognition  Education Outcome: Unable to verbalize;Continued education needed  Therapy Time   Individual Concurrent Group Co-treatment   Time In 1028         Time Out 1054         Minutes [address] [facility]

## 2022-11-29 NOTE — PROGRESS NOTES
Physical Therapy  Facility/Department: [facility] AT THE [facility] MED SURG  Daily Treatment Note  NAME: Araceli Enriquez  : 1935  MRN: [id number]  Date of Service: 2022  Discharge Recommendations:  Continue to assess pending progress   Patient Diagnosis(es): The primary encounter diagnosis was Urinary tract infection with hematuria, site unspecified. A diagnosis of Vaginal bleeding was also pertinent to this visit. Assessment   Assessment: Pt in recliner upon arrival and was agreeable to therapy. Attempted seated exercises with resistance from pt. and pt. began to get aggitated. Treatment ended. Activity Tolerance: Patient tolerated treatment well   Plan    Physcial Therapy Plan  General Plan: 2 times a day 7 days a week  Current Treatment Recommendations: Strengthening;ROM;Balance training;Functional mobility training;Transfer training;Gait training; Endurance training;Neuromuscular re-education;Home exercise program;Safety education & training;Patient/Caregiver education & training; Therapeutic activities   Restrictions  Restrictions/Precautions  Restrictions/Precautions: Fall Risk, General Precautions   Subjective    Subjective  Subjective: Pt in recliner upon arrival and was agreeable to therapy. Attempted seated exercises with resistance from pt. and pt. began to get aggitated. Treatment ended. Pain: no c/o pain at this time. Orientation  Overall Orientation Status: Impaired   Objective   Bed Mobility Training  Bed Mobility Training: No  Transfer Training  Transfer Training: No  Toilet Transfer: Moderate assistance;Assist X2 (Mod A x 2 from chair to bedside commode.)  Gait Training  Gait Training: No  PT Exercises  Exercise Treatment: Attempted Reclined and seated exercises with resistance from pt. noted and pt. began getting aggitated, ended treatment. Safety Devices  Type of Devices: Call light within reach; Left in chair;Chair alarm in place;Nurse notified   Goals  Short Term Goals  Time Frame for Short Term Goals: 20 days  Short Term Goal 1: Pt will be educated on her POC  Short Term Goal 2: Pt will perform all transfers CGA HHA  Short Term Goal 3: Pt will ambulate >/=50 feet HHA CGA in order to use the bathroom  Short Term Goal 4: Pt will increase dynamic standing balance to Fair + in order to reduce fall risk  Education  Patient Education  Education Given To: Patient  Education Provided: Role of Therapy;Plan of Care  Education Provided Comments: Educated patient on reason for therapy with poor understanding  Education Method: Verbal  Barriers to Learning: Vision;Cognition  Education Outcome: Unable to verbalize;Continued education needed  Therapy Time   Individual Concurrent Group Co-treatment   Time In 1406         Time Out 1423         Minutes [place], [place]

## 2022-11-29 NOTE — PROGRESS NOTES
Pt alert and oriented to person only. Vital and assessment completed as charted. Writer and Janna Baez assisted pt from chair to commode and into bed. Pt denies any further needs at this time. Call light and bell are within reach, bed alarm on. Will continue to monitor.

## 2022-11-29 NOTE — DISCHARGE SUMMARY
Discharge Summary    Nancy Reece  :  1935  MRN:  [id number]    Admit date:  2022      Discharge date: 2022     Admitting Physician:  Suni Sierra MD    Discharge Diagnoses:    Principal Problem:    UTI (urinary tract infection)  Active Problems:    Acute kidney injury (nontraumatic) (HCC)    Essential hypertension    Iron deficiency anemia    Urinary tract infection    Severe malnutrition (HCC)    Delusional disorder, mixed type ([facility] [address]    Stage 3 chronic kidney disease ([facility] [address]  Resolved Problems:    * No resolved hospital problems. *      Hospital Course:   Nancy Reece is a 80 y.o. female admitted with UTI. She presented to the emergency room from [facility] due to weakness with transfers nausea and discharge with urination. Nursing home reported poor appetite as well as vaginal bleeding. They reported a decline in her condition over a few days. During her evaluation in the emergency room she was found to have UTI, SKYLER and anemia. Patient is currently not on any NSAIDs or anticoagulants. GI and OB/GYN was consulted for evaluation. Lab studies are stable at this time. Per OB/GYN she has had no further postmenopausal bleeding and there is no gross bleeding with voiding subjectively she has a normal endometrium on ultrasound. They will not consider any further work-up unless more bleeding then they may consider a hysteroscopy or D&C if diagnosis and treatment is desired. She was evaluated by GI and due to current condition stability of lab work and DNR no further work-up will be completed at this time and is not recommended. Patient will return back to [facility] today we will place her on Keflex for another 7 days. Her cultures are negative. She will have lab work in a week.     Consultants:   Dr. Jennifer Palacios, OB/GYN; Dr. Wilson Thayer, GI    Procedures: none    Complications: none    Discharge Condition: fair    Exam:  GEN:    Awake, alert and answers questions  no distress  EYES:   EOMI, pupils equal   NECK: Supple. No lymphadenopathy. No carotid bruit  CVS:     regular rate and rhythm, no audible murmur  PULM:  CTA, no wheezes, rales or rhonchi, no acute respiratory distress  ABD:     Bowels sounds normal.  Abdomen is soft. No distention. no tenderness to palpation. EXT:     no edema bilaterally . No calf tenderness. NEURO: Moves all extremities. Motor and sensory are grossly intact  SKIN:    No rashes. No skin lesions. Significant Diagnostic Studies:   Lab Results   Component Value Date    WBC 16.7 (H) 11/29/2022    HGB 9.2 (L) 11/29/2022     11/29/2022       Lab Results   Component Value Date    BUN 38 (H) 11/29/2022    CREATININE 1.36 (H) 11/29/2022     11/29/2022    K 4.1 11/29/2022    CALCIUM 8.4 (L) 11/29/2022     (H) 11/29/2022    CO2 14 (L) 11/29/2022    LABGLOM 38 (L) 11/29/2022       Lab Results   Component Value Date    WBCUA 50  11/26/2022    RBCUA 10 TO 20 11/26/2022    EPITHUA 2 TO 5 11/26/2022    LEUKOCYTESUR MODERATE (A) 11/26/2022    SPECGRAV 1.020 11/26/2022    GLUCOSEU NEGATIVE 11/26/2022    KETUA NEGATIVE 11/26/2022    PROTEINU 1+ (A) 11/26/2022    HGBUR 2+ (A) 11/26/2022    CASTUA NOT REPORTED 12/17/2014    CRYSTUA NOT REPORTED 12/17/2014    BACTERIA 3+ (A) 11/26/2022    YEAST NOT REPORTED 12/17/2014       CT ABDOMEN PELVIS WO CONTRAST Additional Contrast? Oral    Result Date: 11/27/2022  EXAMINATION: CT OF THE ABDOMEN AND PELVIS WITHOUT CONTRAST 11/27/2022 3:39 pm TECHNIQUE: CT of the abdomen and pelvis was performed without the administration of intravenous contrast. Multiplanar reformatted images are provided for review. Automated exposure control, iterative reconstruction, and/or weight based adjustment of the mA/kV was utilized to reduce the radiation dose to as low as reasonably achievable.  COMPARISON: None HISTORY: ORDERING SYSTEM PROVIDED HISTORY: anemia TECHNOLOGIST PROVIDED HISTORY: anemia Initial encounter FINDINGS: Lower Chest: Small bilateral pleural effusions with adjacent atelectasis. Moderate to large hiatal hernia. Small pericardial effusion. Organs: The unenhanced liver, gallbladder, pancreas, spleen and adrenal glands are without focal abnormality. No renal calculus or hydronephrosis. No perinephric stranding. There is a 3.4 cm right renal cyst. GI/Bowel: Colonic diverticulosis. No acute diverticulitis. However there is mild wall thickening of the rectosigmoid colon with pericolonic stranding. The appendix is normal.  No extraluminal contrast or free air. Moderate to large amount of stool within the rectum. Pelvis: Mild bladder wall thickening with bladder diverticula and trabeculations with mild perivesicular stranding. No pathologically enlarged adenopathy or free fluid. Calcified uterine fibroid is noted. Trace free fluid in the pelvis. Small fat containing bilateral inguinal hernias. Peritoneum/Retroperitoneum: The aorta is normal in caliber with mild atherosclerosis. No pathologically enlarged adenopathy. No ascites or drainable fluid collection. Bones/Soft Tissues: Osteopenia. Mild subcutaneous edema. No acute osseous abnormality. 1. Mild rectosigmoid colitis. Moderate to large amount of stool in the rectum is also noted. 2. Findings are suggestive of cystitis. Recommend correlation with urinalysis. 3. Anasarca. XR CHEST PORTABLE    Result Date: 11/26/2022  EXAMINATION: ONE XRAY VIEW OF THE CHEST 11/26/2022 12:48 pm COMPARISON: December 17, 2014 HISTORY: ORDERING SYSTEM PROVIDED HISTORY: Fever TECHNOLOGIST PROVIDED HISTORY: Fever FINDINGS: Lungs are clear focal consolidation. Interstitial prominence could reflect mild congestive change and or atypical/viral pneumonia. No pneumothorax or significant pleural effusion. Cardiac size enlarged. Mediastinum unremarkable. No acute osseous abnormality.   Telemetry leads overlie the chest.     Cardiomegaly with mild congestive changes and or atypical/viral pneumonia. No focal consolidation.        Assessment and Plan:  Patient Active Problem List    Diagnosis Date Noted    Essential hypertension 12/19/2014    Iron deficiency anemia 12/19/2014    Acute kidney injury (nontraumatic) ([facility] [address] 12/17/2014    Se[facility] ([facility] 11/28/2022    UTI (urinary tract infection) 11/26/2022    Urinary tract infection 11/26/2022    Anxiety disorder 01/27/2020    Depression 01/16/2019    Anorexia 03/07/2018    Cardiomegaly 03/07/2018    Degenerative disease of nervous system ([facility] 03/07/2018    Exotropia 03/07/2018    Unspecified dementia without behavioral disturbance 03/07/2018    Delusional disorder, mixed type ([facility] 03/07/2018    Obsessive compulsive disorder 03/07/2018    Legal blindness, as defined in [place] 03/07/2018    Stage 3 chronic kidney disease ([facility] 03/07/2018        Discharge Medications:         Medication List        START taking these medications      cephALEXin 500 MG capsule  Commonly known as: KEFLEX  Take 1 capsule by mouth 3 times daily for 7 days            CONTINUE taking these medications      bisacodyl 10 MG suppository  Commonly known as: DULCOLAX     brimonidine 0.2 % ophthalmic solution  Commonly known as: ALPHAGAN     clonazePAM 0.5 MG tablet  Commonly known as: KLONOPIN     dextromethorphan 30 MG/5ML extended release liquid  Commonly known as: DELSYM     escitalopram 10 MG tablet  Commonly known as: LEXAPRO     hydrocortisone 2.5 % cream     hyoscyamine 125 MCG/ML solution  Commonly known as: LEVSIN     latanoprost 0.005 % ophthalmic solution  Commonly known as: XALATAN     losartan 50 MG tablet  Commonly known as: COZAAR  Take 1 tablet by mouth daily     magnesium hydroxide 2400 MG/10ML Susp  Commonly known as: MILK OF MAGNESIA CONCENTRATE     mirtazapine 15 MG tablet  Commonly known as: REMERON     omeprazole 20 MG EC tablet     polyethylene glycol 17 GM/SCOOP powder  Commonly known as: GLYCOLAX     promethazine 25 MG tablet  Commonly known as: PHENERGAN     sodium phosphate 7-19 GM/118ML            ASK your doctor about these medications      Acetaminophen 650 MG Tabs  Take 650 mg by mouth every 4 hours as needed. Where to Get Your Medications        Information about where to get these medications is not yet available    Ask your nurse or doctor about these medications  cephALEXin 500 MG capsule         Patient Instructions:    Activity: activity as tolerated  Diet: regular diet  Wound Care: none needed  Other: CBC with differential BMP in 1 week    Disposition:   DC to [facility]    Follow up:  Patient will be followed by Rosina Soni MD in 1-2 weeks    CORE MEASURES on Discharge (if applicable)  ACE/ARB in CHF: NA  Statin in MI: NA  ASA in MI: NA  Statin in CVA: NA  Antiplatelet in CVA: NA    Total time spent on discharge services: 40 minutes    Including the following activities:  Evaluation and Management of patient  Discussion with patient and/or surrogate about current care plan  Coordination with Case Management and/or   Coordination of care with Consultants (if applicable)   Coordination of care with Receiving Facility Physician (if applicable)  Completion of DME forms (if applicable)  Preparation of Discharge Summary  Preparation of Medication Reconciliation  Preparation of Discharge Prescriptions    Signed:  GISSELLE Borja - CNP, GISSELLE, NP-C  11/29/2022, 12:00 PM

## 2022-11-29 NOTE — CONSULTS
Department of Gynecology  Attending Consult Note      Reason for Consult:  postmenopausal bleeding    CHIEF COMPLAINT:   Called for consult due to blood noted during pericare; small amount on tissue; no blood noted since; no blood on diaper; no blood in bedside commode    History obtained from chart and nurses -reason patient could not give history:  dementia    HISTORY OF PRESENT ILLNESS:                   The patient is a 80 y.o. female with significant past medical history of the following who presents with UTI and had incidental noting of blood during pericare. Past Medical History:        Diagnosis Date    Anemia     Anorexia     Anxiety disorder 1/27/2020    Blind     Cardiomegaly     Chronic kidney disease     Dementia (HCC)     Depression     Hypertension     OCD (obsessive compulsive disorder)      Past Surgical History:        Procedure Laterality Date    CATARACT REMOVAL         Past Gynecological History:    Patient is postmenopausal and not sexually active. OB History   No obstetric history on file. Medications Prior to Admission:   Medications Prior to Admission: clonazePAM (KLONOPIN) 0.5 MG tablet, Take 0.25 mg by mouth in the morning and at bedtime. escitalopram (LEXAPRO) 10 MG tablet, Take 10 mg by mouth daily Indications: Feeling Anxious  mirtazapine (REMERON) 15 MG tablet, Take 15 mg by mouth nightly Indications: Depression  brimonidine (ALPHAGAN) 0.2 % ophthalmic solution,   hydrocortisone 2.5 % cream, Apply topically 3 times daily as needed Apply to affected area topically as needed for bee sting to right inner elbow TID  omeprazole 20 MG EC tablet, Take 20 mg by mouth in the morning.   losartan (COZAAR) 50 MG tablet, Take 1 tablet by mouth daily  hyoscyamine (LEVSIN) 125 MCG/ML solution, Take 0.125 mg by mouth every 4 hours as needed  promethazine (PHENERGAN) 25 MG tablet, Take 25 mg by mouth every 6 hours as needed for Nausea  bisacodyl (DULCOLAX) 10 MG suppository, Place 10 mg rectally daily as needed for Constipation  dextromethorphan (DELSYM) 30 MG/5ML extended release liquid, Take 30 mg by mouth 2 times daily as needed for Cough  Sodium Phosphates (FLEET) 7-19 GM/118ML, Place 1 enema rectally once as needed  latanoprost (XALATAN) 0.005 % ophthalmic solution, Place 1 drop into the right eye nightly  magnesium hydroxide (MILK OF MAGNESIA CONCENTRATE) 2400 MG/10ML SUSP, Take 2,400 mg by mouth daily as needed  polyethylene glycol (GLYCOLAX) powder, Take 17 g by mouth daily  acetaminophen 650 MG TABS, Take 650 mg by mouth every 4 hours as needed. (Patient taking differently: Take 650 mg by mouth every 4 hours as needed 3 times a day for pain & every 4 hours PRN)    Allergies:  Patient has no known allergies. Social History:  TOBACCO:  Never used tobacco  ETOH:   reports no history of alcohol use. Family History:   History reviewed. No pertinent family history.     REVIEW OF SYSTEMS:      Constitutional:  negative  Eyes:  pt is blind  Respiratory:  negative  Cardiovascular:  negative  Gastrointestinal:  positive for poor appetite  Genitourinary:  negative for dysuria and hematuria  Musculoskeletal:  positive for  muscle weakness  Behavior/Psych:  positive for poor appetite and confusion    PHYSICAL EXAM:    Vitals:  /64   Pulse 73   Temp 98 °F (36.7 °C) (Temporal)   Resp 18   Ht 5' 4\" (1.626 m)   Wt 96 lb 1.6 oz (43.6 kg)   SpO2 92%   BMI 16.50 kg/m²     External Genitalia: General appearance; normal, Hair distribution; normal, Lesions absent  CONSTITUTIONAL:  awake, cooperative, and no apparent distress  EYES:  blind  LUNGS:  no increased work of breathing  CARDIOVASCULAR:  regular rate and rhythm  GENITAL/URINARY:  External Genitalia:  General appearance; normal, Hair distribution; normal, Lesions absent  NEUROLOGIC:  Mental Status Exam:  Level of Alertness:   awake  Orientation:   person  Language:  normal  SKIN:  no bruising or bleeding    DATA:  Labs:  CBC:   Lab Results   Component Value Date/Time    WBC 20.8 11/28/2022 06:20 AM    RBC 3.13 11/28/2022 06:20 AM    HGB 9.5 11/28/2022 06:20 AM    HCT 29.6 11/28/2022 06:20 AM    MCV 94.6 11/28/2022 06:20 AM    RDW 15.3 11/28/2022 06:20 AM     11/28/2022 06:20 AM     BMP:    Lab Results   Component Value Date/Time     11/28/2022 06:20 AM    K 4.5 11/28/2022 06:20 AM     11/28/2022 06:20 AM    CO2 16 11/28/2022 06:20 AM    BUN 54 11/28/2022 06:20 AM     CMP:    Lab Results   Component Value Date/Time     11/28/2022 06:20 AM    K 4.5 11/28/2022 06:20 AM     11/28/2022 06:20 AM    CO2 16 11/28/2022 06:20 AM    BUN 54 11/28/2022 06:20 AM    PROT 5.9 11/28/2022 06:20 AM     U/A:  No components found for: Genora Shoulder, USPGRAV, UPH, UPROTEIN, UGLUCOSE, UKETONE, UBILI, UBLOOD, UNITRITE, UUROBIL, ULEUKEST, USQEPI, URENEPI, UWBC, URBC, Synchari, UHYALINE  HgBA1c:  No components found for: HGBA1C  Radiology Review:  Pelvic usn without endometrial thickening    IMPRESSION/RECOMMENDATIONS:      Principal Problem:    UTI (urinary tract infection)    Active Problems:    Acute kidney injury (nontraumatic) (HCC)    Essential hypertension    Iron deficiency anemia    Urinary tract infection    Severe malnutrition (HCC)    Delusional disorder, mixed type ([address]    Stage 3 chronic kidney disease ([address]    Report of postmenopausal bleeding without further episode; no gross bleeding with voiding; subjectively normal endometrium on usn  Will not consider any further work up unless more bleeding then could consider hysteroscopy D&C if diagnosis and treatment desired.

## 2022-11-29 NOTE — PROGRESS NOTES
Pt vitals and assessment completed at this time, see flowsheet. Pt oriented to self only, breathing normal. Pt assisted to bedside commode, then to chair. Pt denies any further needs at this time, call light within reach, will continue to monitor.

## 2022-11-29 NOTE — PROGRESS NOTES
Progress Note    SUBJECTIVE:    Patient seen for f/u of UTI (urinary tract infection). She sitting up in chair in no distress. No complaints     ROS:   Constitutional: negative  for fevers, and negative for chills. Respiratory: negative for shortness of breath, negative for cough, and negative for wheezing  Cardiovascular: negative for chest pain, and negative for palpitations  Gastrointestinal: negative for abdominal pain, negative for nausea,negative for vomiting, negative for diarrhea, and negative for constipation     All other systems were reviewed with the patient and are negative unless otherwise stated in HPI      OBJECTIVE:      Vitals:   Vitals:    11/29/22 0745   BP: (!) 179/67   Pulse: 77   Resp: 16   Temp: 97.1 °F (36.2 °C)   SpO2: 91%     Weight: 96 lb 4.8 oz (43.7 kg)   Height: 5' 4\" (162.6 cm)     Weight  Wt Readings from Last 3 Encounters:   11/29/22 96 lb 4.8 oz (43.7 kg)     Body mass index is 16.53 kg/m². 24HR INTAKE/OUTPUT:      Intake/Output Summary (Last 24 hours) at 11/29/2022 0849  Last data filed at 11/29/2022 0410  Gross per 24 hour   Intake 2289 ml   Output 650 ml   Net 1639 ml     -----------------------------------------------------------------  Exam:    GEN:    Awake, alert and answers questions no distress  EYES:  EOMI, pupils equal   NECK: Supple. No lymphadenopathy. No carotid bruit  CVS:    regular rate and rhythm, no audible murmur  PULM:  CTA, no wheezes, rales or rhonchi, no acute respiratory distress  ABD:    Bowels sounds normal.  Abdomen is soft. No distention. no tenderness to palpation. EXT:   no edema bilaterally . No calf tenderness. NEURO: Moves all extremities. Motor and sensory are grossly intact  SKIN:  No rashes.   No skin lesions.    -----------------------------------------------------------------    Diagnostic Data:      Complete Blood Count:   Recent Labs     11/27/22  0545 11/27/22  1630 11/28/22  0620 11/29/22  0545   WBC 23.3*  --  20.8* 16.7* RBC 2.24*  --  3.13* 3.06*   HGB 6.5* 8.7* 9.5* 9.2*   HCT 20.6* 27.5* 29.6* 29.5*   MCV 92.0  --  94.6 96.4   MCH 29.0  --  30.4 30.1   MCHC 31.6  --  32.1 31.2   RDW 15.6*  --  15.3* 15.2*     --  246 235   MPV 12.2  --  12.1 12.1        Last 3 Blood Glucose:   Recent Labs     11/26/22  1104 11/27/22  0545 11/28/22  0620 11/29/22  0545   GLUCOSE 122* 79 77 81        Comprehensive Metabolic Profile:   Recent Labs     11/27/22  0545 11/28/22  0620 11/29/22  0545    143 144   K 5.2 4.5 4.1   * 114* 117*   CO2 20 16* 14*   BUN 68* 54* 38*   CREATININE 2.25* 1.58* 1.36*   GLUCOSE 79 77 81   CALCIUM 8.3* 8.4* 8.4*   PROT 5.5* 5.9* 5.7*   LABALBU 3.1* 3.3* 3.1*   BILITOT 0.3 0.3 0.3   ALKPHOS 75 213* 156*   AST 45* 124* 59*   ALT 14 69* 49*        Urinalysis:   Lab Results   Component Value Date/Time    NITRU NEGATIVE 11/26/2022 10:55 AM    COLORU Yellow 11/26/2022 10:55 AM    PHUR 6.0 11/26/2022 10:55 AM    WBCUA 50  11/26/2022 10:55 AM    RBCUA 10 TO 20 11/26/2022 10:55 AM    MUCUS NOT REPORTED 12/17/2014 04:00 PM    TRICHOMONAS NOT REPORTED 12/17/2014 04:00 PM    YEAST NOT REPORTED 12/17/2014 04:00 PM    BACTERIA 3+ 11/26/2022 10:55 AM    SPECGRAV 1.020 11/26/2022 10:55 AM    LEUKOCYTESUR MODERATE 11/26/2022 10:55 AM    UROBILINOGEN Normal 11/26/2022 10:55 AM    BILIRUBINUR NEGATIVE 11/26/2022 10:55 AM    GLUCOSEU NEGATIVE 11/26/2022 10:55 AM    KETUA NEGATIVE 11/26/2022 10:55 AM    AMORPHOUS TRACE 12/17/2014 04:00 PM       HgBA1c:  No results found for: LABA1C    Lactic Acid:   Lab Results   Component Value Date/Time    LACTA 2.0 11/26/2022 08:25 PM    LACTA 2.6 11/26/2022 05:20 PM        Troponin: No results for input(s): TROPONINI in the last 72 hours. CRP:  No results for input(s): CRP in the last 72 hours. Radiology/Imaging:  US PELVIS COMPLETE   Final Result   Limited visibility of the endometrial stripe on transabdominal images.    Reportedly, patient was not cooperative for endovaginal imaging. No obvious   signs of endometrial thickening. CT ABDOMEN PELVIS WO CONTRAST Additional Contrast? Oral   Preliminary Result   1. Mild rectosigmoid colitis. Moderate to large amount of stool in the   rectum is also noted. 2. Findings are suggestive of cystitis. Recommend correlation with   urinalysis. 3. Anasarca. XR CHEST PORTABLE   Final Result   Cardiomegaly with mild congestive changes and or atypical/viral pneumonia. No focal consolidation. ASSESSMENT / PLAN:  UTI (urinary tract infection)  Continue current therapy   IV fluids  IV Zosyn  Cultures neg  Essential hypertension  Continue losartan  SKYLER on CKD  Trend labs-improving  IV fluids  Iron deficiency anemia  Appreciate GI-stool occult positive  H/H-stable  Appreciate OB/GYN-vaginal discharge/vaginal 3 bleeding her blood pressure  CT abdomen and pelvis 11/27/2022-cystitis. Calcified uterine fibroid.   Pelvic ultrasound today  Nutrition status:   severe malnutrition  Dietician consult initiated  Hospital Prophylaxis:   DVT: none due to bleeding    Stress Ulcer: PPI   High risk medications: none   Disposition:    Discharge plan is [address] [place], [place] [address]THO - CNP , GISSELLE, NP-C  Hospitalist Medicine        11/29/2022, 8:49 AM

## 2022-11-30 NOTE — PROGRESS NOTES
I saw the patient and discussed with nursing and primary team. This was a heme occult stool check and she had no yazan GI blood loss. In addition, she is 80 DNR/DNI. Stable hemoglobin in the range of 8-9, there is no indication at this time to subject her to an EGD and a colonoscopy. Attempted to see her in her room but she was sleeping and an attempt to verbally arouse her was not successful. No procedures at this time unless there is an overt bleed.

## 2022-11-30 NOTE — PROGRESS NOTES
Belongings are collected and within the patient's possession. IV taken out. Patient left the floor at this time via lifestart back to [facility].  Left message at [facility] to expected patient arrival.

## 2022-11-30 NOTE — PROGRESS NOTES
Physician Progress Note      Nichole Sosa  [facility] #:                  [id number]  :                       1935  ADMIT DATE:       2022 10:28 AM  DISCH DATE:        2022 9:47 PM  RESPONDING  PROVIDER #:        Charanjit Childers MD          QUERY TEXT:    Patient admitted with SKYLER in the setting of CKD stage 3. Creatinine peaked at   2.25 and improved to 1.35 by . In order to support the diagnosis of SKYLER   per KDIGO criteria, please include additional clinical indicators in your   documentation or please document if the diagnosis of SKYLER has been ruled out   after further study. The medical record reflects the following:  -Risk Factors: CKD 3. HTN, Anemia, DM  -Clinical Indicators: Pt admitted with a s. creatinine level of 2.02 that   worsened to 2.25. Per review of past lab results, s. creatinine range is   between 1.05-1.64 since 2019. Per current documentation, no baseline   creatinine level has been established. , creatinine improved to 1.36.  -Treatment: IVFs, Labs, inpatient monitoring. Defined by Kidney Disease Improving Global Outcomes (KDIGO) clinical practice   guideline for acute kidney injury:  -Increase in SCr by greater than or equal to 0.3 mg/dl within 48 hours; or  -Increase or decrease in SCr to greater than or equal to 1.5 times baseline,   which is known or presumed to have occurred within the prior 7 days; or  -Urine volume < 0.5ml/kg/h for 6 hours. Options provided:  -- Acute kidney injury evidenced by, Please document evidence as well as a   numerical baseline creatinine, if known.   -- Acute kidney injury ruled out after study  -- Other - I will add my own diagnosis  -- Disagree - Not applicable / Not valid  -- Disagree - Clinically unable to determine / Unknown  -- Refer to Clinical Documentation Reviewer    PROVIDER RESPONSE TEXT:    This patient has acute kidney injury as evidenced by increase of creatinine of   1.26 on 2022 to 2.02 on 11/26/2022    Query created by: Wesley Presley on 11/29/2022 6:35 PM      QUERY TEXT:    Patient admitted for treatment of a UTI, SKYLER and vaginal bleeding. UA obtained   on presentation shows moderate leukocyte esterase and bacteria, however,   reflex to culture shows no bacterial growth. In order to support the diagnosis   of a UTI, please include additional clinical indicators in your documentation   or please document if the diagnosis of UTI has been ruled out after further   study. The medical record reflects the following:  -Risk Factors: CKD, incontinence  -Clinical Indicators: UA on presentation shows mod LE's, w/ Bacteria, No   Nitrites or elevated urine WBCs. Culture negative for growth. CT abd/pelvis   with findings suggestive of cystitis and mild rectosigmoid colitis. WBC's   13.3--> 23.3--> 16.7. Temp 101.1. Lactate 3.4--> 2.0  -Treatment: IVFs, IV Zosyn, Rocephin, Keflex. Labs, Cultures  Options provided:  -- UTI present as evidenced by, Please document evidence. -- UTI was ruled out  -- Other - I will add my own diagnosis  -- Disagree - Not applicable / Not valid  -- Disagree - Clinically unable to determine / Unknown  -- Refer to Clinical Documentation Reviewer    PROVIDER RESPONSE TEXT:    UTI was ruled out after study. Query created by: Wesley Presley on 11/29/2022 6:43 PM      QUERY TEXT:    Patient admitted for treatment of SKYLER, UTI and possible vaginal bleeding. CT   Abd/Pelvis completed on 11/27 shows findings suggestive of rectosigmoid   colitis. Patient had leukocytosis of 23.3, elevated lactate acid of 3.4, a +   Occult Blood test and was started on IV Zosyn for indications of \"intra-   abdominal infection\". After review, please determine if this patient has been   evaluated and/or treated for any of the following:     The medical record reflects the following:  -Risk Factors: Abnormal uterine and vaginal bleeding, anemia, dementia  -Clinical Indicators: CT Abd Pelvis on 11/27 shows colonic diverticulosis and   wall thickening of the rectosigmoid colon with pericolonic stranding   suggestive of colitis. HGB 6.5, transfused 1 U PRBCs; WBC 20.8, 23.3, 13.3,   16.7; Lactate 3.4--> 2.0; LFTs elevated from baseline: ALT 69 (14);    (45); ALK PHOS 213 (75); Positive stool for occult blood on 11/27; During   admission, RN notes patient having mucus like stools. -Treatment: Admission, CT Scan, Labs, Cultures, Zosyn, Rocephin, IVFs  Options provided:  -- Bacterial Colitis  -- Acute Ischemic Colitis  -- Chronic Ischemic Colitis  -- Ulcerative Colitis  -- Colitis due to other etiology, Please document other etiology. -- Colitis ruled out  -- Other - I will add my own diagnosis  -- Disagree - Not applicable / Not valid  -- Disagree - Clinically unable to determine / Unknown  -- Refer to Clinical Documentation Reviewer    PROVIDER RESPONSE TEXT:    This patient has findings suggestive of bacterial colitis.     Query created by: Rj Hernandez on 11/29/2022 7:00 PM      Electronically signed by:  Tamanna James MD 11/30/2022 9:55 AM

## 2022-11-30 NOTE — PROGRESS NOTES
Physician Progress Note      Jenelle Lyons  [facility] #:                  [id number]  :                       1935  ADMIT DATE:       2022 10:28 AM  DISCH DATE:        2022 9:47 PM  RESPONDING  PROVIDER #:        Viet Ibanez MD          QUERY TEXT:    Patient admitted for treatment of SKYLER and bacterial colitis. On presentation,   the patient was febrile, tachycardic, hypotensive with an elevated lactate and   leukocytosis. After review, please determine if this patient is being   evaluated and/ or treated for any of the following: The medical record reflects the following:  -Risk Factors: Abnormal uterine and vaginal bleeding, anemia, dementia  -Clinical Indicators: WBC 20.8, 23.3, 13.3-->16.7; Lactate 3.4--> 2.0; T max   101.1 (38.4); HR up to 108; RR 14-20; BP 92/41, 94/45, 93/42; SOFA Score 4; CT   Abd Pelvis on  shows findings suggestive of cystitis and colitis. HGB   6.5, transfused 1 U PRBCs; LFTs elevated from baseline: ALT 69 (14);    (45); ALK PHOS 213 (75); Positive stool for occult blood on ; During   admission, RN notes patient having mucus like stools. -Treatment: Admission, CT Scan, Labs, Cultures, NS bolus, IVF @ 125, Rocephin,   Zosyn (for intra-abdominal infection). Options provided:  -- Sepsis, present on admission  -- Sepsis ruled out  -- Other - I will add my own diagnosis  -- Disagree - Not applicable / Not valid  -- Disagree - Clinically unable to determine / Unknown  -- Refer to Clinical Documentation Reviewer    PROVIDER RESPONSE TEXT:    Sepsis has been ruled out after study.     Query created by: Cari Campuzano on 2022 7:08 PM      Electronically signed by:  Viet Ibanez MD 2022 3:47 PM

## 2022-12-01 ENCOUNTER — OUTSIDE SERVICES (OUTPATIENT)
Dept: PRIMARY CARE CLINIC | Age: 87
End: 2022-12-01

## 2022-12-01 ENCOUNTER — CLINICAL DOCUMENTATION ONLY (OUTPATIENT)
Facility: CLINIC | Age: 87
End: 2022-12-01

## 2022-12-01 DIAGNOSIS — N17.9 ACUTE KIDNEY INJURY (NONTRAUMATIC) (HCC): Primary | ICD-10-CM

## 2022-12-01 DIAGNOSIS — F22 DELUSIONAL DISORDER, MIXED TYPE (HCC): ICD-10-CM

## 2022-12-01 DIAGNOSIS — D50.0 IRON DEFICIENCY ANEMIA DUE TO CHRONIC BLOOD LOSS: Chronic | ICD-10-CM

## 2022-12-01 DIAGNOSIS — I10 ESSENTIAL HYPERTENSION: ICD-10-CM

## 2022-12-01 NOTE — PROGRESS NOTES
Patient:  Alisa Wong, 1935  I saw this patient on 11/30/2022, at [facility]   Had acute kidney injury ,anemia and was admitted. Initial urine was indicative of infection but cultures neg  Renal function improved with hydration and required  transfusion  Denies any pain  She feels well         Reason for Visit:      ICD-10-CM    1. Acute kidney injury (nontraumatic) (HCC)  N17.9       2. Iron deficiency anemia due to chronic blood loss  D50.0       3. Essential hypertension  I10       4. Delusional disorder, mixed type ([address]  F22               Changes since last visit:   BP stable,eating well  Mood stable  Denies pain  1. Fall:  none  2. Behavioral Change: mood stable  3. Pain Control: no issues  4. Mobility: no change  5. Pressure Sore:  none  Allergies:  Patient has no known allergies. Current Outpatient Medications   Medication Sig Dispense Refill    cephALEXin (KEFLEX) 500 MG capsule Take 1 capsule by mouth 3 times daily for 7 days 21 capsule 0    clonazePAM (KLONOPIN) 0.5 MG tablet Take 0.25 mg by mouth in the morning and at bedtime. escitalopram (LEXAPRO) 10 MG tablet Take 10 mg by mouth daily Indications: Feeling Anxious      mirtazapine (REMERON) 15 MG tablet Take 15 mg by mouth nightly Indications: Depression      brimonidine (ALPHAGAN) 0.2 % ophthalmic solution       hydrocortisone 2.5 % cream Apply topically 3 times daily as needed Apply to affected area topically as needed for bee sting to right inner elbow TID      omeprazole 20 MG EC tablet Take 20 mg by mouth in the morning.       losartan (COZAAR) 50 MG tablet Take 1 tablet by mouth daily 90 tablet 0    hyoscyamine (LEVSIN) 125 MCG/ML solution Take 0.125 mg by mouth every 4 hours as needed      promethazine (PHENERGAN) 25 MG tablet Take 25 mg by mouth every 6 hours as needed for Nausea      bisacodyl (DULCOLAX) 10 MG suppository Place 10 mg rectally daily as needed for Constipation      dextromethorphan (DELSYM) 30 MG/5ML extended release liquid Take 30 mg by mouth 2 times daily as needed for Cough      Sodium Phosphates (FLEET) 7-19 GM/118ML Place 1 enema rectally once as needed      latanoprost (XALATAN) 0.005 % ophthalmic solution Place 1 drop into the right eye nightly      magnesium hydroxide (MILK OF MAGNESIA CONCENTRATE) 2400 MG/10ML SUSP Take 2,400 mg by mouth daily as needed      polyethylene glycol (GLYCOLAX) powder Take 17 g by mouth daily      acetaminophen 650 MG TABS Take 650 mg by mouth every 4 hours as needed. (Patient taking differently: Take 650 mg by mouth every 4 hours as needed 3 times a day for pain & every 4 hours PRN) 120 tablet 3     No current facility-administered medications for this visit. Past Medical History:    Past Medical History:   Diagnosis Date    Anemia     Anorexia     Anxiety disorder 1/27/2020    Blind     Cardiomegaly     Chronic kidney disease     Dementia (HCC)     Depression     Hypertension     OCD (obsessive compulsive disorder)        Past Surgical History:    Past Surgical History:   Procedure Laterality Date    CATARACT REMOVAL         Social History:   Social History     Tobacco Use    Smoking status: Never    Smokeless tobacco: Never   Substance Use Topics    Alcohol use: Never       Family History:   No family history on file. Review of Systems:  Constitutional: negative for fevers or chills  Eyes: blind both eyes  ENT: negative for sore throat or nasal congestion,no dysphagia  Respiratory: neg for shortness of breath , cough  Cardiovascular: neg for chest pain , palpitations,pnd,negative for leg edema  Gastrointestinal: neg for for abd pain, nausea, vomiting, diarrhea or constipation,no gene,no blood in stool,  Appetite better  Genitourinary: negative for dysuria, urgency,hematuria or frequency  Integument/breast: negative for skin rash or lesions  Neurological: negative for unilateral weakness, numbness or tingling.   Muscular Skeletal: no joint pain   Psych :mood stable, no agitation    Objective:    Vitals: BP: 134/64 T:97.4 P:70 R:18   -----------------------------------------------------------------  Exam:    GEN:   Awake, not in any distress,   EYES:  Blind both eyes  ENT: Throat- no lesions, no neck nodes or sinus tenderness  NECK: normal, supple, no lymphadenopathy,  no carotid bruits  PULM: clear to auscultation bilaterally- no wheezes, rales or rhonchi, normal air movement, no respiratory distress  COR:   regular rate & rhythm, no murmurs and no gallops  ABD:    soft, non-tender, non-distended, normal bowel sounds, no masses or organomegaly  : deferred  EXT:no cyanosis, clubbing , edema ,  no calf tenderness, and warm to touch. NEURO: Motor and sensory grossly intact  PSYCH:  Mood stable ,judgement impaired  SKIN:   No skin lesions or rashes    -----------------------------------------------------------------  Diagnostic Data:   All diagnostic data was reviewed    Assessment:        ICD-10-CM    1. Acute kidney injury (nontraumatic) ([facility])  N17.9       2. Iron deficiency anemia due to chronic blood loss  D50.0       3. Essential hypertension  I10       4. Delusional disorder, mixed type ([facility]  [facility]                   Patient Active Problem List   Diagnosis Code    Acute kidney injury (nontraumatic) ([facility] N17.9    Essential hypertension I10    Iron deficiency anemia D50.9    Anorexia R63.0    Cardiomegaly I51.7    Degenerative disease of nervous system ([facility] G31.9    Exotropia H50.10    Unspecified dementia without behavioral disturbance F03.90    Delusional disorder, mixed type ([facility] F22    Obsessive compulsive disorder F42.9    Legal blindness, as defined in [place] H54.8    Stage 3 chronic kidney disease ([facility] N18.30    Depression F32. A    Anxiety disorder F41.9    UTI (urinary tract infection) N39.0    Urinary tract infection N39.0    Severe malnutrition ([facility] E43         Plan:     Pt and ot  Continue hydration  Monitor bp  Monitor for behavioral changes  Continue current medications  Prevent pressure sore  Prevent falls    Electronically signed by Keith Knight MD on 12/2/2022 at 4:55 PM

## 2022-12-02 LAB
CULTURE: NORMAL
CULTURE: NORMAL
Lab: NORMAL
Lab: NORMAL
SPECIMEN DESCRIPTION: NORMAL
SPECIMEN DESCRIPTION: NORMAL

## 2022-12-06 ENCOUNTER — HOSPITAL ENCOUNTER (OUTPATIENT)
Age: 87
Setting detail: SPECIMEN
Discharge: HOME | End: 2022-12-06
Payer: MEDICARE

## 2022-12-06 DIAGNOSIS — N39.0 URINARY TRACT INFECTION WITH HEMATURIA, SITE UNSPECIFIED: ICD-10-CM

## 2022-12-06 DIAGNOSIS — R31.9 URINARY TRACT INFECTION WITH HEMATURIA, SITE UNSPECIFIED: ICD-10-CM

## 2022-12-06 LAB
ABSOLUTE EOS #: 0.05 K/UL (ref 0–0.44)
ABSOLUTE IMMATURE GRANULOCYTE: <0.03 K/UL (ref 0–0.3)
ABSOLUTE LYMPH #: 0.95 K/UL (ref 1.1–3.7)
ABSOLUTE MONO #: 0.38 K/UL (ref 0.1–1.2)
ANION GAP SERPL CALCULATED.3IONS-SCNC: 8 MMOL/L (ref 9–17)
BASOPHILS # BLD: 1 % (ref 0–2)
BASOPHILS ABSOLUTE: 0.05 K/UL (ref 0–0.2)
BUN BLDV-MCNC: 23 MG/DL (ref 8–23)
BUN/CREAT BLD: 21 (ref 9–20)
CALCIUM SERPL-MCNC: 9 MG/DL (ref 8.6–10.4)
CHLORIDE BLD-SCNC: 109 MMOL/L (ref 98–107)
CO2: 24 MMOL/L (ref 20–31)
CREAT SERPL-MCNC: 1.11 MG/DL (ref 0.5–0.9)
EOSINOPHILS RELATIVE PERCENT: 1 % (ref 1–4)
GFR SERPL CREATININE-BSD FRML MDRD: 48 ML/MIN/1.73M2
GLUCOSE BLD-MCNC: 79 MG/DL (ref 70–99)
HCT VFR BLD CALC: 29 % (ref 36.3–47.1)
HEMOGLOBIN: 9.1 G/DL (ref 11.9–15.1)
IMMATURE GRANULOCYTES: 0 %
LYMPHOCYTES # BLD: 21 % (ref 24–43)
MCH RBC QN AUTO: 29.3 PG (ref 25.2–33.5)
MCHC RBC AUTO-ENTMCNC: 31.4 G/DL (ref 28.4–34.8)
MCV RBC AUTO: 93.2 FL (ref 82.6–102.9)
MONOCYTES # BLD: 8 % (ref 3–12)
NRBC AUTOMATED: 0 PER 100 WBC
PDW BLD-RTO: 15.4 % (ref 11.8–14.4)
PLATELET # BLD: 302 K/UL (ref 138–453)
PMV BLD AUTO: 12 FL (ref 8.1–13.5)
POTASSIUM SERPL-SCNC: 5 MMOL/L (ref 3.7–5.3)
RBC # BLD: 3.11 M/UL (ref 3.95–5.11)
SEG NEUTROPHILS: 69 % (ref 36–65)
SEGMENTED NEUTROPHILS ABSOLUTE COUNT: 3.18 K/UL (ref 1.5–8.1)
SODIUM BLD-SCNC: 141 MMOL/L (ref 135–144)
WBC # BLD: 4.6 K/UL (ref 3.5–11.3)

## 2022-12-06 PROCEDURE — P9603 ONE-WAY ALLOW PRORATED MILES: HCPCS

## 2022-12-06 PROCEDURE — 85025 COMPLETE CBC W/AUTO DIFF WBC: CPT

## 2022-12-06 PROCEDURE — 80048 BASIC METABOLIC PNL TOTAL CA: CPT

## 2022-12-06 PROCEDURE — 36415 COLL VENOUS BLD VENIPUNCTURE: CPT

## 2022-12-07 ENCOUNTER — OUTSIDE SERVICES (OUTPATIENT)
Dept: PRIMARY CARE CLINIC | Age: 87
End: 2022-12-07

## 2022-12-07 DIAGNOSIS — N18.30 STAGE 3 CHRONIC KIDNEY DISEASE, UNSPECIFIED WHETHER STAGE 3A OR 3B CKD (HCC): ICD-10-CM

## 2022-12-07 DIAGNOSIS — I10 ESSENTIAL HYPERTENSION: Primary | ICD-10-CM

## 2022-12-07 DIAGNOSIS — D50.0 IRON DEFICIENCY ANEMIA DUE TO CHRONIC BLOOD LOSS: ICD-10-CM

## 2022-12-07 DIAGNOSIS — F22 DELUSIONAL DISORDER, MIXED TYPE (HCC): ICD-10-CM

## 2022-12-07 NOTE — PROGRESS NOTES
Patient:  Tata Jorgensen, 1935  I saw this patient on 12/07/2022, at [facility]   Is feeling better  Denies any pain          Reason for Visit:      ICD-10-CM    1. Essential hypertension  I10       2. Delusional disorder, mixed type ([facility]  F22       3. Iron deficiency anemia due to chronic blood loss  D50.0       4. Stage 3 chronic kidney disease, unspecified whether stage 3a or 3b CKD ([facility]  N18.30                 Changes since last visit:   BP stable,eating well  Mood stable  Denies pain  1. Fall:  none  2. Behavioral Change: mood stable  3. Pain Control: no issues  4. Mobility: no change  5. Pressure Sore:  none  Allergies:  Patient has no known allergies. Current Outpatient Medications   Medication Sig Dispense Refill    clonazePAM (KLONOPIN) 0.5 MG tablet Take 0.25 mg by mouth in the morning and at bedtime. escitalopram (LEXAPRO) 10 MG tablet Take 10 mg by mouth daily Indications: Feeling Anxious      mirtazapine (REMERON) 15 MG tablet Take 15 mg by mouth nightly Indications: Depression      brimonidine (ALPHAGAN) 0.2 % ophthalmic solution       hydrocortisone 2.5 % cream Apply topically 3 times daily as needed Apply to affected area topically as needed for bee sting to right inner elbow TID      omeprazole 20 MG EC tablet Take 20 mg by mouth in the morning.       losartan (COZAAR) 50 MG tablet Take 1 tablet by mouth daily 90 tablet 0    hyoscyamine (LEVSIN) 125 MCG/ML solution Take 0.125 mg by mouth every 4 hours as needed      promethazine (PHENERGAN) 25 MG tablet Take 25 mg by mouth every 6 hours as needed for Nausea      bisacodyl (DULCOLAX) 10 MG suppository Place 10 mg rectally daily as needed for Constipation      dextromethorphan (DELSYM) 30 MG/5ML extended release liquid Take 30 mg by mouth 2 times daily as needed for Cough      Sodium Phosphates (FLEET) 7-19 GM/118ML Place 1 enema rectally once as needed      latanoprost (XALATAN) 0.005 % ophthalmic solution Place 1 drop into the right eye nightly      magnesium hydroxide (MILK OF MAGNESIA CONCENTRATE) 2400 MG/10ML SUSP Take 2,400 mg by mouth daily as needed      polyethylene glycol (GLYCOLAX) powder Take 17 g by mouth daily      acetaminophen 650 MG TABS Take 650 mg by mouth every 4 hours as needed. (Patient taking differently: Take 650 mg by mouth every 4 hours as needed 3 times a day for pain & every 4 hours PRN) 120 tablet 3     No current facility-administered medications for this visit. Past Medical History:    Past Medical History:   Diagnosis Date    Anemia     Anorexia     Anxiety disorder 1/27/2020    Blind     Cardiomegaly     Chronic kidney disease     Dementia (HCC)     Depression     Hypertension     OCD (obsessive compulsive disorder)        Past Surgical History:    Past Surgical History:   Procedure Laterality Date    CATARACT REMOVAL         Social History:   Social History     Tobacco Use    Smoking status: Never    Smokeless tobacco: Never   Substance Use Topics    Alcohol use: Never       Family History:   No family history on file. Review of Systems:  Constitutional: negative for fevers or chills  Eyes: blind both eyes  ENT: negative for sore throat or nasal congestion,no dysphagia  Respiratory: neg for shortness of breath , cough  Cardiovascular: neg for chest pain , palpitations,pnd,negative for leg edema  Gastrointestinal: neg for for abd pain, nausea, vomiting, diarrhea or constipation,no gene,no blood in stool,  Appetite better  Genitourinary: negative for dysuria, urgency,hematuria or frequency  Integument/breast: negative for skin rash or lesions  Neurological: negative for unilateral weakness, numbness or tingling.   Muscular Skeletal: no joint pain   Psych :mood stable, no agitation    Objective:    Vitals: BP: 137/74 T:97.2 P:71 R:18   -----------------------------------------------------------------  Exam:    GEN:   Awake, not in any distress,   EYES:  Blind both eyes  ENT: Throat- no lesions, no neck nodes or sinus tenderness  NECK: normal, supple, no lymphadenopathy,  no carotid bruits  PULM: clear to auscultation bilaterally- no wheezes, rales or rhonchi, normal air movement, no respiratory distress  COR:   regular rate & rhythm, no murmurs and no gallops  ABD:    soft, non-tender, non-distended, normal bowel sounds, no masses or organomegaly  : deferred  EXT:no cyanosis, clubbing , edema ,  no calf tenderness, and warm to touch. NEURO: Motor and sensory grossly intact  PSYCH:  Mood stable ,judgement impaired  SKIN:   No skin lesions or rashes    -----------------------------------------------------------------  Diagnostic Data:   All diagnostic data was reviewed    Assessment:        ICD-10-CM    1. Essential hypertension  I10       2. Delusional disorder, mixed type ([facility]  F22       3. Iron deficiency anemia due to chronic blood loss  D50.0       4. Stage 3 chronic kidney disease, unspecified whether stage 3a or 3b CKD ([facility]  N18.30                     Patient Active Problem List   Diagnosis Code    Acute kidney injury (nontraumatic) ([facility] N17.9    Essential hypertension I10    Iron deficiency anemia D50.9    Anorexia R63.0    Cardiomegaly I51.7    Degenerative disease of nervous system ([facility] G31.9    Exotropia H50.10    Unspecified dementia without behavioral disturbance F03.90    Delusional disorder, mixed type ([facility] F22    Obsessive compulsive disorder F42.9    Legal blindness, as defined in [place] H54.8    Stage 3 chronic kidney disease ([facility] N18.30    Depression F32. A    Anxiety disorder F41.9    UTI (urinary tract infection) N39.0    Urinary tract infection N39.0    Severe malnutrition ([address] E43         Plan:       Encourage hydration  Monitor bp  Monitor for behavioral changes  Continue current medications  Prevent pressure sore  Prevent falls    Electronically signed by Dell Garnett MD on 12/11/2022 at 10:58 AM

## 2022-12-12 ENCOUNTER — OUTSIDE SERVICES (OUTPATIENT)
Dept: PRIMARY CARE CLINIC | Age: 87
End: 2022-12-12
Payer: MEDICARE

## 2022-12-12 DIAGNOSIS — D50.0 IRON DEFICIENCY ANEMIA DUE TO CHRONIC BLOOD LOSS: ICD-10-CM

## 2022-12-12 DIAGNOSIS — N18.30 STAGE 3 CHRONIC KIDNEY DISEASE, UNSPECIFIED WHETHER STAGE 3A OR 3B CKD (HCC): ICD-10-CM

## 2022-12-12 DIAGNOSIS — F22 DELUSIONAL DISORDER, MIXED TYPE (HCC): ICD-10-CM

## 2022-12-12 DIAGNOSIS — I10 ESSENTIAL HYPERTENSION: Primary | ICD-10-CM

## 2022-12-12 PROCEDURE — 99308 SBSQ NF CARE LOW MDM 20: CPT | Performed by: FAMILY MEDICINE

## 2022-12-12 NOTE — PROGRESS NOTES
Patient:  Tata Jorgensen, 1935  I saw this patient on 12/12/2022, at [facility]   She has poor appetite  Denies any pain        Reason for Visit:      ICD-10-CM    1. Essential hypertension  I10       2. Stage 3 chronic kidney disease, unspecified whether stage 3a or 3b CKD (HCC)  N18.30       3. Delusional disorder, mixed type ([address]  F22       4. Iron deficiency anemia due to chronic blood loss  D50.0               Changes since last visit:   BP stable,has poor appetite  Mood stable  Denies pain  1. Fall:  none  2. Behavioral Change: mood stable  3. Pain Control: no issues  4. Mobility: no change  5. Pressure Sore:  none  Allergies:  Patient has no known allergies. Current Outpatient Medications   Medication Sig Dispense Refill    clonazePAM (KLONOPIN) 0.5 MG tablet Take 0.25 mg by mouth in the morning and at bedtime. escitalopram (LEXAPRO) 10 MG tablet Take 10 mg by mouth daily Indications: Feeling Anxious      mirtazapine (REMERON) 15 MG tablet Take 15 mg by mouth nightly Indications: Depression      brimonidine (ALPHAGAN) 0.2 % ophthalmic solution       hydrocortisone 2.5 % cream Apply topically 3 times daily as needed Apply to affected area topically as needed for bee sting to right inner elbow TID      omeprazole 20 MG EC tablet Take 20 mg by mouth in the morning.       losartan (COZAAR) 50 MG tablet Take 1 tablet by mouth daily 90 tablet 0    hyoscyamine (LEVSIN) 125 MCG/ML solution Take 0.125 mg by mouth every 4 hours as needed      promethazine (PHENERGAN) 25 MG tablet Take 25 mg by mouth every 6 hours as needed for Nausea      bisacodyl (DULCOLAX) 10 MG suppository Place 10 mg rectally daily as needed for Constipation      dextromethorphan (DELSYM) 30 MG/5ML extended release liquid Take 30 mg by mouth 2 times daily as needed for Cough      Sodium Phosphates (FLEET) 7-19 GM/118ML Place 1 enema rectally once as needed      latanoprost (XALATAN) 0.005 % ophthalmic solution Place 1 drop into the right eye nightly      magnesium hydroxide (MILK OF MAGNESIA CONCENTRATE) 2400 MG/10ML SUSP Take 2,400 mg by mouth daily as needed      polyethylene glycol (GLYCOLAX) powder Take 17 g by mouth daily      acetaminophen 650 MG TABS Take 650 mg by mouth every 4 hours as needed. (Patient taking differently: Take 650 mg by mouth every 4 hours as needed 3 times a day for pain & every 4 hours PRN) 120 tablet 3     No current facility-administered medications for this visit. Past Medical History:    Past Medical History:   Diagnosis Date    Anemia     Anorexia     Anxiety disorder 1/27/2020    Blind     Cardiomegaly     Chronic kidney disease     Dementia (HCC)     Depression     Hypertension     OCD (obsessive compulsive disorder)        Past Surgical History:    Past Surgical History:   Procedure Laterality Date    CATARACT REMOVAL         Social History:   Social History     Tobacco Use    Smoking status: Never    Smokeless tobacco: Never   Substance Use Topics    Alcohol use: Never       Family History:   No family history on file. Review of Systems:  Constitutional: negative for fevers or chills  Eyes: blind both eyes  ENT: negative for sore throat or nasal congestion,no dysphagia  Respiratory: neg for shortness of breath , cough  Cardiovascular: neg for chest pain , palpitations,pnd,negative for leg edema  Gastrointestinal: neg for for abd pain, nausea, vomiting, diarrhea or constipation,no gene,no blood in stool,  Appetite decreased  Genitourinary: negative for dysuria, urgency,hematuria or frequency  Integument/breast: negative for skin rash or lesions  Neurological: negative for unilateral weakness, numbness or tingling.   Muscular Skeletal: no joint pain   Psych :mood stable, no agitation    Objective:    Vitals: BP: 129/64 T:97.7 P:69 R:16   -----------------------------------------------------------------  Exam:    GEN:   Awake, not in any distress,   EYES:  Blind both eyes  ENT: Throat- no lesions, no neck nodes or sinus tenderness  NECK: normal, supple, no lymphadenopathy,  no carotid bruits  PULM: clear to auscultation bilaterally- no wheezes, rales or rhonchi, normal air movement, no respiratory distress  COR:   regular rate & rhythm, no murmurs and no gallops  ABD:    soft, non-tender, non-distended, normal bowel sounds, no masses or organomegaly  : deferred  EXT:no cyanosis, clubbing , edema ,  no calf tenderness, and warm to touch. NEURO: Motor and sensory grossly intact  PSYCH:  Mood stable ,judgement impaired  SKIN:   No skin lesions or rashes    -----------------------------------------------------------------  Diagnostic Data:   All diagnostic data was reviewed    Assessment:        ICD-10-CM    1. Essential hypertension  I10       2. Stage 3 chronic kidney disease, unspecified whether stage 3a or 3b CKD ([facility])  N18.30       3. Delusional disorder, mixed type ([facility]  F22       4. Iron deficiency anemia due to chronic blood loss  D50.0                       Patient Active Problem List   Diagnosis Code    Acute kidney injury (nontraumatic) ([facility]) N17.9    Essential hypertension I10    Iron deficiency anemia D50.9    Anorexia R63.0    Cardiomegaly I51.7    Degenerative disease of nervous system ([facility]) G31.9    Exotropia H50.10    Unspecified dementia without behavioral disturbance F03.90    Delusional disorder, mixed type ([facility]) F22    Obsessive compulsive disorder F42.9    Legal blindness, as defined in [place] H54.8    Stage 3 chronic kidney disease ([facility] [address] N18.30    Depression F32. A    Anxiety disorder F41.9    UTI (urinary tract infection) N39.0    Urinary tract infection N39.0    Severe malnutrition ([address] E43         Plan:       Encourage hydration  Pt and ot  Increase remeron to 30 mg  Monitor bp  Monitor for behavioral changes  Continue current medications  Prevent pressure sore  Prevent falls    Electronically signed by Yola Riojas MD on 12/12/2022 at 7:01 PM

## 2022-12-13 ENCOUNTER — OUTSIDE SERVICES (OUTPATIENT)
Dept: PRIMARY CARE CLINIC | Age: 87
End: 2022-12-13

## 2022-12-13 DIAGNOSIS — N18.30 STAGE 3 CHRONIC KIDNEY DISEASE, UNSPECIFIED WHETHER STAGE 3A OR 3B CKD (HCC): ICD-10-CM

## 2022-12-13 DIAGNOSIS — F22 DELUSIONAL DISORDER, MIXED TYPE (HCC): ICD-10-CM

## 2022-12-13 DIAGNOSIS — D50.0 IRON DEFICIENCY ANEMIA DUE TO CHRONIC BLOOD LOSS: ICD-10-CM

## 2022-12-13 DIAGNOSIS — N17.9 ACUTE KIDNEY INJURY (NONTRAUMATIC) (HCC): Primary | ICD-10-CM

## 2022-12-13 DIAGNOSIS — I10 ESSENTIAL HYPERTENSION: ICD-10-CM

## 2022-12-13 NOTE — PROGRESS NOTES
Patient:  Kesha Jin, 1935  I saw this patient on 12/14/2022, at [facility]   She continues to have  poor appetite  Denies any pain        Reason for Visit:      ICD-10-CM    1. Acute kidney injury (nontraumatic) ([facility])  N17.9       2. Essential hypertension  I10       3. Stage 3 chronic kidney disease, unspecified whether stage 3a or 3b CKD ([facility])  N18.30       4. Delusional disorder, mixed type ([facility] [address]  F22       5. Iron deficiency anemia due to chronic blood loss  D50.0                 Changes since last visit:   BP higher,has poor appetite  Mood stable  Denies pain  1. Fall:  none  2. Behavioral Change: mood stable  3. Pain Control: no issues  4. Mobility: no change  5. Pressure Sore:  none  Allergies:  Patient has no known allergies. Current Outpatient Medications   Medication Sig Dispense Refill    clonazePAM (KLONOPIN) 0.5 MG tablet Take 0.25 mg by mouth in the morning and at bedtime. escitalopram (LEXAPRO) 10 MG tablet Take 10 mg by mouth daily Indications: Feeling Anxious      mirtazapine (REMERON) 15 MG tablet Take 15 mg by mouth nightly Indications: Depression      brimonidine (ALPHAGAN) 0.2 % ophthalmic solution       hydrocortisone 2.5 % cream Apply topically 3 times daily as needed Apply to affected area topically as needed for bee sting to right inner elbow TID      omeprazole 20 MG EC tablet Take 20 mg by mouth in the morning.       losartan (COZAAR) 50 MG tablet Take 1 tablet by mouth daily 90 tablet 0    hyoscyamine (LEVSIN) 125 MCG/ML solution Take 0.125 mg by mouth every 4 hours as needed      promethazine (PHENERGAN) 25 MG tablet Take 25 mg by mouth every 6 hours as needed for Nausea      bisacodyl (DULCOLAX) 10 MG suppository Place 10 mg rectally daily as needed for Constipation      dextromethorphan (DELSYM) 30 MG/5ML extended release liquid Take 30 mg by mouth 2 times daily as needed for Cough      Sodium Phosphates (FLEET) 7-19 GM/118ML Place 1 enema rectally once as needed latanoprost (XALATAN) 0.005 % ophthalmic solution Place 1 drop into the right eye nightly      magnesium hydroxide (MILK OF MAGNESIA CONCENTRATE) 2400 MG/10ML SUSP Take 2,400 mg by mouth daily as needed      polyethylene glycol (GLYCOLAX) powder Take 17 g by mouth daily      acetaminophen 650 MG TABS Take 650 mg by mouth every 4 hours as needed. (Patient taking differently: Take 650 mg by mouth every 4 hours as needed 3 times a day for pain & every 4 hours PRN) 120 tablet 3     No current facility-administered medications for this visit. Past Medical History:    Past Medical History:   Diagnosis Date    Anemia     Anorexia     Anxiety disorder 1/27/2020    Blind     Cardiomegaly     Chronic kidney disease     Dementia (HCC)     Depression     Hypertension     OCD (obsessive compulsive disorder)        Past Surgical History:    Past Surgical History:   Procedure Laterality Date    CATARACT REMOVAL         Social History:   Social History     Tobacco Use    Smoking status: Never    Smokeless tobacco: Never   Substance Use Topics    Alcohol use: Never       Family History:   No family history on file. Review of Systems:  Constitutional: negative for fevers or chills  Eyes: blind both eyes  ENT: negative for sore throat or nasal congestion,no dysphagia  Respiratory: neg for shortness of breath , cough  Cardiovascular: neg for chest pain , palpitations,pnd,negative for leg edema  Gastrointestinal: neg for for abd pain, nausea, vomiting, diarrhea or constipation,no gene,no blood in stool,  Appetite decreased  Genitourinary: negative for dysuria, urgency,hematuria or frequency  Integument/breast: negative for skin rash or lesions  Neurological: negative for unilateral weakness, numbness or tingling.   Muscular Skeletal: no joint pain   Psych :mood stable, no agitation    Objective:    Vitals: BP: 166/76 T:97.4 P:79 R:20 -----------------------------------------------------------------  Exam:    GEN:   Awake, not in any distress,   EYES:  Blind both eyes  ENT: Throat- no lesions, no neck nodes or sinus tenderness  NECK: normal, supple, no lymphadenopathy,  no carotid bruits  PULM: clear to auscultation bilaterally- no wheezes, rales or rhonchi, normal air movement, no respiratory distress  COR:   regular rate & rhythm, no murmurs and no gallops  ABD:    soft, non-tender, non-distended, normal bowel sounds, no masses or organomegaly  : deferred  EXT:no cyanosis, clubbing , edema ,  no calf tenderness, and warm to touch. NEURO: Motor and sensory grossly intact  PSYCH:  Mood stable ,judgement impaired  SKIN:   No skin lesions or rashes    -----------------------------------------------------------------  Diagnostic Data:   All diagnostic data was reviewed    Assessment:        ICD-10-CM    1. Acute kidney injury (nontraumatic) ([facility])  N17.9       2. Essential hypertension  I10       3. Stage 3 chronic kidney disease, unspecified whether stage 3a or 3b CKD ([facility])  N18.30       4. Delusional disorder, mixed type ([address]  F22       5. Iron deficiency anemia due to chronic blood loss  D50.0                         Patient Active Problem List   Diagnosis Code    Acute kidney injury (nontraumatic) ([facility]) N17.9    Essential hypertension I10    Iron deficiency anemia D50.9    Anorexia R63.0    Cardiomegaly I51.7    Degenerative disease of nervous system ([facility]) G31.9    Exotropia H50.10    Unspecified dementia without behavioral disturbance F03.90    Delusional disorder, mixed type ([facility]) F22    Obsessive compulsive disorder F42.9    Legal blindness, as defined in [place] H54.8    Stage 3 chronic kidney disease ([address] N18.30    Depression F32. A    Anxiety disorder F41.9    UTI (urinary tract infection) N39.0    Urinary tract infection N39.0    Severe malnutrition ([address] E43         Plan:       Encourage hydration  Pt and ot  Monitor bp  Monitor for behavioral changes  Continue current medications  Prevent pressure sore  Prevent falls    Electronically signed by Lore Vizcaino MD on 12/16/2022 at 8:10 AM

## 2022-12-26 PROBLEM — N39.0 UTI (URINARY TRACT INFECTION): Status: RESOLVED | Noted: 2022-11-26 | Resolved: 2022-12-26

## 2022-12-26 PROBLEM — N39.0 URINARY TRACT INFECTION: Status: RESOLVED | Noted: 2022-11-26 | Resolved: 2022-12-26

## 2022-12-28 ENCOUNTER — OUTSIDE SERVICES (OUTPATIENT)
Dept: PRIMARY CARE CLINIC | Age: 87
End: 2022-12-28

## 2022-12-28 DIAGNOSIS — I10 ESSENTIAL HYPERTENSION: ICD-10-CM

## 2022-12-28 DIAGNOSIS — N17.9 ACUTE KIDNEY INJURY (NONTRAUMATIC) (HCC): Primary | ICD-10-CM

## 2022-12-28 DIAGNOSIS — D50.0 IRON DEFICIENCY ANEMIA DUE TO CHRONIC BLOOD LOSS: ICD-10-CM

## 2022-12-28 DIAGNOSIS — F22 DELUSIONAL DISORDER, MIXED TYPE (HCC): ICD-10-CM

## 2022-12-28 DIAGNOSIS — N18.30 STAGE 3 CHRONIC KIDNEY DISEASE, UNSPECIFIED WHETHER STAGE 3A OR 3B CKD (HCC): ICD-10-CM

## 2022-12-28 NOTE — PROGRESS NOTES
Patient:  Andreea Kelly, 1935  I saw this patient on 12/28/2022, at [facility]   Tolerating pt well  She continues to have  poor appetite  Denies any pain        Reason for Visit:      ICD-10-CM    1. Acute kidney injury (nontraumatic) (formerly [facility])  N17.9       2. Essential hypertension  I10       3. Stage 3 chronic kidney disease, unspecified whether stage 3a or 3b CKD (formerly [facility])  N18.30       4. Delusional disorder, mixed type ([address]  F22       5. Iron deficiency anemia due to chronic blood loss  D50.0                   Changes since last visit:   BP still high,has poor appetite  Mood stable  Denies pain  1. Fall:  none  2. Behavioral Change: mood stable  3. Pain Control: no issues  4. Mobility: no change  5. Pressure Sore:  none  Allergies:  Patient has no known allergies. Current Outpatient Medications   Medication Sig Dispense Refill    clonazePAM (KLONOPIN) 0.5 MG tablet Take 0.25 mg by mouth in the morning and at bedtime. escitalopram (LEXAPRO) 10 MG tablet Take 10 mg by mouth daily Indications: Feeling Anxious      mirtazapine (REMERON) 15 MG tablet Take 15 mg by mouth nightly Indications: Depression      brimonidine (ALPHAGAN) 0.2 % ophthalmic solution       hydrocortisone 2.5 % cream Apply topically 3 times daily as needed Apply to affected area topically as needed for bee sting to right inner elbow TID      omeprazole 20 MG EC tablet Take 20 mg by mouth in the morning.       losartan (COZAAR) 50 MG tablet Take 1 tablet by mouth daily 90 tablet 0    hyoscyamine (LEVSIN) 125 MCG/ML solution Take 0.125 mg by mouth every 4 hours as needed      promethazine (PHENERGAN) 25 MG tablet Take 25 mg by mouth every 6 hours as needed for Nausea      bisacodyl (DULCOLAX) 10 MG suppository Place 10 mg rectally daily as needed for Constipation      dextromethorphan (DELSYM) 30 MG/5ML extended release liquid Take 30 mg by mouth 2 times daily as needed for Cough      Sodium Phosphates (FLEET) 7-19 GM/118ML Place 1 enema rectally once as needed      latanoprost (XALATAN) 0.005 % ophthalmic solution Place 1 drop into the right eye nightly      magnesium hydroxide (MILK OF MAGNESIA CONCENTRATE) 2400 MG/10ML SUSP Take 2,400 mg by mouth daily as needed      polyethylene glycol (GLYCOLAX) powder Take 17 g by mouth daily      acetaminophen 650 MG TABS Take 650 mg by mouth every 4 hours as needed. (Patient taking differently: Take 650 mg by mouth every 4 hours as needed 3 times a day for pain & every 4 hours PRN) 120 tablet 3     No current facility-administered medications for this visit. Past Medical History:    Past Medical History:   Diagnosis Date    Anemia     Anorexia     Anxiety disorder 1/27/2020    Blind     Cardiomegaly     Chronic kidney disease     Dementia (HCC)     Depression     Hypertension     OCD (obsessive compulsive disorder)        Past Surgical History:    Past Surgical History:   Procedure Laterality Date    CATARACT REMOVAL         Social History:   Social History     Tobacco Use    Smoking status: Never    Smokeless tobacco: Never   Substance Use Topics    Alcohol use: Never       Family History:   No family history on file. Review of Systems:  Constitutional: negative for fevers or chills  Eyes: blind both eyes  ENT: negative for sore throat or nasal congestion,no dysphagia  Respiratory: neg for shortness of breath , cough  Cardiovascular: neg for chest pain , palpitations,pnd,negative for leg edema  Gastrointestinal: neg for for abd pain, nausea, vomiting, diarrhea or constipation,no gene,no blood in stool,  Appetite decreased  Genitourinary: negative for dysuria, urgency,hematuria or frequency  Integument/breast: negative for skin rash or lesions  Neurological: negative for unilateral weakness, numbness or tingling.   Muscular Skeletal: no joint pain   Psych :mood stable, no agitation    Objective:    Vitals: BP: 154/71 T:97.3 P:67 R:18 -----------------------------------------------------------------  Exam:    GEN:   Awake, not in any distress,   EYES:  Blind both eyes  ENT: Throat- no lesions, no neck nodes or sinus tenderness  NECK: normal, supple, no lymphadenopathy,  no carotid bruits  PULM: clear to auscultation bilaterally- no wheezes, rales or rhonchi, normal air movement, no respiratory distress  COR:   regular rate & rhythm, no murmurs and no gallops  ABD:    soft, non-tender, non-distended, normal bowel sounds, no masses or organomegaly  : deferred  EXT:no cyanosis, clubbing , edema ,  no calf tenderness, and warm to touch. NEURO: Motor and sensory grossly intact  PSYCH:  Mood stable ,judgement impaired  SKIN:   No skin lesions or rashes    -----------------------------------------------------------------  Diagnostic Data:   All diagnostic data was reviewed    Assessment:        ICD-10-CM    1. Acute kidney injury (nontraumatic) (Former[facility])  N17.9       2. Essential hypertension  I10       3. Stage 3 chronic kidney disease, unspecified whether stage 3a or 3b CKD ([facility])  N18.30       4. Delusional disorder, mixed type ([facility]  F22       5. Iron deficiency anemia due to chronic blood loss  D50.0                           Patient Active Problem List   Diagnosis Code    Acute kidney injury (nontraumatic) (Former[facility]) N17.9    Essential hypertension I10    Iron deficiency anemia D50.9    Anorexia R63.0    Cardiomegaly I51.7    Degenerative disease of nervous system ([facility]) G31.9    Exotropia H50.10    Unspecified dementia without behavioral disturbance F03.90    Delusional disorder, mixed type ([facility]) F22    Obsessive compulsive disorder F42.9    Legal blindness, as defined in [place] H54.8    Stage 3 chronic kidney disease ([facility] N18.30    Depression F32. A    Anxiety disorder F41.9    Severe malnutrition ([facility] [address] E43         Plan:       Encourage hydration  Pt and ot  Monitor bp  Monitor for behavioral changes  Continue current medications  Prevent pressure sore  Prevent falls    Electronically signed by Jen Menezes MD on 12/28/2022 at 3:58 PM

## 2023-01-04 ENCOUNTER — OUTSIDE SERVICES (OUTPATIENT)
Dept: PRIMARY CARE CLINIC | Age: 88
End: 2023-01-04

## 2023-01-04 DIAGNOSIS — I10 ESSENTIAL HYPERTENSION: Primary | ICD-10-CM

## 2023-01-04 DIAGNOSIS — N18.30 STAGE 3 CHRONIC KIDNEY DISEASE, UNSPECIFIED WHETHER STAGE 3A OR 3B CKD (HCC): ICD-10-CM

## 2023-01-04 DIAGNOSIS — F22 DELUSIONAL DISORDER, MIXED TYPE (HCC): ICD-10-CM

## 2023-01-04 DIAGNOSIS — E43 SEVERE MALNUTRITION (HCC): ICD-10-CM

## 2023-01-04 NOTE — PROGRESS NOTES
Patient:  Darvin Meredith, 1935  I saw this patient on 01/04/2023, at [facility]   Tolerating pt well  Appetite improving  Denies any pain        Reason for Visit:      ICD-10-CM    1. Essential hypertension  I10       2. Severe malnutrition ([address]  E43       3. Delusional disorder, mixed type ([address]  F22       4. Stage 3 chronic kidney disease, unspecified whether stage 3a or 3b CKD (HCC)  N18.30                     Changes since last visit:   BP still high  Mood stable  Denies pain  1. Fall:  none  2. Behavioral Change: mood stable  3. Pain Control: no issues  4. Mobility: no change  5. Pressure Sore:  none  Allergies:  Patient has no known allergies. Current Outpatient Medications   Medication Sig Dispense Refill    clonazePAM (KLONOPIN) 0.5 MG tablet Take 0.25 mg by mouth in the morning and at bedtime. escitalopram (LEXAPRO) 10 MG tablet Take 10 mg by mouth daily Indications: Feeling Anxious      mirtazapine (REMERON) 15 MG tablet Take 15 mg by mouth nightly Indications: Depression      brimonidine (ALPHAGAN) 0.2 % ophthalmic solution       hydrocortisone 2.5 % cream Apply topically 3 times daily as needed Apply to affected area topically as needed for bee sting to right inner elbow TID      omeprazole 20 MG EC tablet Take 20 mg by mouth in the morning.       losartan (COZAAR) 50 MG tablet Take 1 tablet by mouth daily 90 tablet 0    hyoscyamine (LEVSIN) 125 MCG/ML solution Take 0.125 mg by mouth every 4 hours as needed      promethazine (PHENERGAN) 25 MG tablet Take 25 mg by mouth every 6 hours as needed for Nausea      bisacodyl (DULCOLAX) 10 MG suppository Place 10 mg rectally daily as needed for Constipation      dextromethorphan (DELSYM) 30 MG/5ML extended release liquid Take 30 mg by mouth 2 times daily as needed for Cough      Sodium Phosphates (FLEET) 7-19 GM/118ML Place 1 enema rectally once as needed      latanoprost (XALATAN) 0.005 % ophthalmic solution Place 1 drop into the right eye nightly magnesium hydroxide (MILK OF MAGNESIA CONCENTRATE) 2400 MG/10ML SUSP Take 2,400 mg by mouth daily as needed      polyethylene glycol (GLYCOLAX) powder Take 17 g by mouth daily      acetaminophen 650 MG TABS Take 650 mg by mouth every 4 hours as needed. (Patient taking differently: Take 650 mg by mouth every 4 hours as needed 3 times a day for pain & every 4 hours PRN) 120 tablet 3     No current facility-administered medications for this visit. Past Medical History:    Past Medical History:   Diagnosis Date    Anemia     Anorexia     Anxiety disorder 1/27/2020    Blind     Cardiomegaly     Chronic kidney disease     Dementia (HCC)     Depression     Hypertension     OCD (obsessive compulsive disorder)        Past Surgical History:    Past Surgical History:   Procedure Laterality Date    CATARACT REMOVAL         Social History:   Social History     Tobacco Use    Smoking status: Never    Smokeless tobacco: Never   Substance Use Topics    Alcohol use: Never       Family History:   No family history on file. Review of Systems:  Constitutional: negative for fevers or chills  Eyes: blind both eyes  ENT: negative for sore throat or nasal congestion,no dysphagia  Respiratory: neg for shortness of breath , cough  Cardiovascular: neg for chest pain , palpitations,pnd,negative for leg edema  Gastrointestinal: neg for for abd pain, nausea, vomiting, diarrhea or constipation,no gene,no blood in stool,  Appetite better  Genitourinary: negative for dysuria, urgency,hematuria or frequency  Integument/breast: negative for skin rash or lesions  Neurological: negative for unilateral weakness, numbness or tingling.   Muscular Skeletal: no joint pain   Psych :mood stable, no agitation    Objective:    Vitals: BP: 158/84 T:97.2 P:80 R:18   -----------------------------------------------------------------  Exam:    GEN:   Awake, not in any distress,   EYES:  Blind both eyes  ENT: Throat- no lesions, no neck nodes or sinus tenderness  NECK: normal, supple, no lymphadenopathy,  no carotid bruits  PULM: clear to auscultation bilaterally- no wheezes, rales or rhonchi, normal air movement, no respiratory distress  COR:   regular rate & rhythm, no murmurs and no gallops  ABD:    soft, non-tender, non-distended, normal bowel sounds, no masses or organomegaly  : deferred  EXT:no cyanosis, clubbing , edema ,  no calf tenderness, and warm to touch. NEURO: Motor and sensory grossly intact  PSYCH:  Mood stable ,judgement impaired  SKIN:   No skin lesions or rashes    -----------------------------------------------------------------  Diagnostic Data:   All diagnostic data was reviewed    Assessment:        ICD-10-CM    1. Essential hypertension  I10       2. Severe malnutrition ([address]  E43       3. Delusional disorder, mixed type ([address]  F22       4. Stage 3 chronic kidney disease, unspecified whether stage 3a or 3b CKD ([address]  N18.30                             Patient Active Problem List   Diagnosis Code    Acute kidney injury (nontraumatic) ([address] N17.9    Essential hypertension I10    Iron deficiency anemia D50.9    Anorexia R63.0    Cardiomegaly I51.7    Degenerative disease of nervous system ([address] G31.9    Exotropia H50.10    Unspecified dementia without behavioral disturbance F03.90    Delusional disorder, mixed type ([address] F22    Obsessive compulsive disorder F42.9    Legal blindness, as defined in [place] 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 A    Anxiety disorder F41.9    Severe malnutrition ([address] E43         Plan:       Encourage hydration  Pt and ot  Increase losartan to 100 mg and monitor bp  Monitor for behavioral changes  Continue current medications  Prevent pressure sore  Prevent falls    Electronically signed by Desirae Nails MD on 1/8/2023 at 1:54 PM

## 2023-01-18 ENCOUNTER — OUTSIDE SERVICES (OUTPATIENT)
Dept: PRIMARY CARE CLINIC | Age: 88
End: 2023-01-18

## 2023-01-18 DIAGNOSIS — I10 ESSENTIAL HYPERTENSION: Primary | ICD-10-CM

## 2023-03-10 NOTE — PROGRESS NOTES
Covering for . PDMP reviewed.   Electronically signed by Payton Enamorado MD on 3/10/2023 at 6:00 PM

## 2023-05-04 ENCOUNTER — HOSPITAL ENCOUNTER (OUTPATIENT)
Age: 88
Setting detail: SPECIMEN
Discharge: HOME | End: 2023-05-04
Payer: MEDICARE

## 2023-05-04 LAB
ALBUMIN SERPL-MCNC: 3.9 G/DL (ref 3.5–5.2)
ALBUMIN/GLOBULIN RATIO: 1.5 (ref 1–2.5)
ALP SERPL-CCNC: 108 U/L (ref 35–104)
ALT SERPL-CCNC: 12 U/L (ref 5–33)
ANION GAP SERPL CALCULATED.3IONS-SCNC: 9 MMOL/L (ref 9–17)
AST SERPL-CCNC: 20 U/L
BILIRUB SERPL-MCNC: 0.2 MG/DL (ref 0.3–1.2)
BUN SERPL-MCNC: 49 MG/DL (ref 8–23)
BUN/CREAT BLD: 40 (ref 9–20)
CALCIUM SERPL-MCNC: 9.4 MG/DL (ref 8.6–10.4)
CHLORIDE SERPL-SCNC: 108 MMOL/L (ref 98–107)
CHOLEST SERPL-MCNC: 220 MG/DL
CHOLESTEROL/HDL RATIO: 4
CO2 SERPL-SCNC: 24 MMOL/L (ref 20–31)
CREAT SERPL-MCNC: 1.24 MG/DL (ref 0.5–0.9)
GFR SERPL CREATININE-BSD FRML MDRD: 42 ML/MIN/1.73M2
GLUCOSE SERPL-MCNC: 88 MG/DL (ref 70–99)
HDLC SERPL-MCNC: 55 MG/DL
LDLC SERPL CALC-MCNC: 145 MG/DL (ref 0–130)
POTASSIUM SERPL-SCNC: 5.7 MMOL/L (ref 3.7–5.3)
PROT SERPL-MCNC: 6.5 G/DL (ref 6.4–8.3)
SODIUM SERPL-SCNC: 141 MMOL/L (ref 135–144)
TRIGL SERPL-MCNC: 98 MG/DL
TSH SERPL-ACNC: 1.8 UIU/ML (ref 0.3–5)

## 2023-05-04 PROCEDURE — 84443 ASSAY THYROID STIM HORMONE: CPT

## 2023-05-04 PROCEDURE — 80053 COMPREHEN METABOLIC PANEL: CPT

## 2023-05-04 PROCEDURE — 80061 LIPID PANEL: CPT

## 2023-05-04 PROCEDURE — 36415 COLL VENOUS BLD VENIPUNCTURE: CPT

## 2023-07-17 ENCOUNTER — HOSPITAL ENCOUNTER (OUTPATIENT)
Age: 88
Setting detail: SPECIMEN
Discharge: HOME | End: 2023-07-17

## 2023-10-25 ENCOUNTER — HOSPITAL ENCOUNTER (OUTPATIENT)
Age: 88
Setting detail: SPECIMEN
Discharge: HOME | End: 2023-10-25
Payer: MEDICARE

## 2023-10-25 LAB
ERYTHROCYTE [DISTWIDTH] IN BLOOD BY AUTOMATED COUNT: 13.1 % (ref 11.8–14.4)
HCT VFR BLD AUTO: 31.9 % (ref 36.3–47.1)
HGB BLD-MCNC: 10 G/DL (ref 11.9–15.1)
MCH RBC QN AUTO: 30.4 PG (ref 25.2–33.5)
MCHC RBC AUTO-ENTMCNC: 31.3 G/DL (ref 28.4–34.8)
MCV RBC AUTO: 97 FL (ref 82.6–102.9)
NRBC BLD-RTO: 0 PER 100 WBC
PLATELET # BLD AUTO: 260 K/UL (ref 138–453)
PMV BLD AUTO: 12.4 FL (ref 8.1–13.5)
RBC # BLD AUTO: 3.29 M/UL (ref 3.95–5.11)
WBC OTHER # BLD: 4.3 K/UL (ref 3.5–11.3)

## 2023-10-25 PROCEDURE — 85027 COMPLETE CBC AUTOMATED: CPT

## 2023-10-25 PROCEDURE — 36415 COLL VENOUS BLD VENIPUNCTURE: CPT

## 2023-11-09 ENCOUNTER — HOSPITAL ENCOUNTER (OUTPATIENT)
Age: 88
Setting detail: SPECIMEN
Discharge: HOME | End: 2023-11-09
Payer: MEDICARE

## 2023-11-09 LAB
BNP SERPL-MCNC: 555 PG/ML
ERYTHROCYTE [DISTWIDTH] IN BLOOD BY AUTOMATED COUNT: 13 % (ref 11.8–14.4)
HCT VFR BLD AUTO: 29.7 % (ref 36.3–47.1)
HGB BLD-MCNC: 9.5 G/DL (ref 11.9–15.1)
MCH RBC QN AUTO: 30.6 PG (ref 25.2–33.5)
MCHC RBC AUTO-ENTMCNC: 32 G/DL (ref 28.4–34.8)
MCV RBC AUTO: 95.8 FL (ref 82.6–102.9)
NRBC BLD-RTO: 0 PER 100 WBC
PLATELET # BLD AUTO: 239 K/UL (ref 138–453)
PMV BLD AUTO: 11.8 FL (ref 8.1–13.5)
RBC # BLD AUTO: 3.1 M/UL (ref 3.95–5.11)
WBC OTHER # BLD: 4.7 K/UL (ref 3.5–11.3)

## 2023-11-09 PROCEDURE — 85027 COMPLETE CBC AUTOMATED: CPT

## 2023-11-09 PROCEDURE — P9603 ONE-WAY ALLOW PRORATED MILES: HCPCS

## 2023-11-09 PROCEDURE — 83540 ASSAY OF IRON: CPT

## 2023-11-09 PROCEDURE — 83880 ASSAY OF NATRIURETIC PEPTIDE: CPT

## 2023-11-09 PROCEDURE — 82728 ASSAY OF FERRITIN: CPT

## 2023-11-09 PROCEDURE — 36415 COLL VENOUS BLD VENIPUNCTURE: CPT

## 2023-11-10 ENCOUNTER — HOSPITAL ENCOUNTER (OUTPATIENT)
Age: 88
Setting detail: SPECIMEN
Discharge: HOME | End: 2023-11-10
Payer: MEDICARE

## 2023-11-10 LAB

## 2023-11-10 PROCEDURE — 81001 URINALYSIS AUTO W/SCOPE: CPT

## 2023-11-15 LAB
FERRITIN SERPL-MCNC: 61 NG/ML (ref 13–150)
IRON SERPL-MCNC: 26 UG/DL (ref 37–145)

## 2024-01-25 ENCOUNTER — HOSPITAL ENCOUNTER (OUTPATIENT)
Age: 89
Setting detail: SPECIMEN
Discharge: HOME | End: 2024-01-25
Payer: MEDICARE

## 2024-01-25 LAB
ERYTHROCYTE [DISTWIDTH] IN BLOOD BY AUTOMATED COUNT: 14.1 % (ref 11.8–14.4)
HCT VFR BLD AUTO: 29 % (ref 36.3–47.1)
HGB BLD-MCNC: 9 G/DL (ref 11.9–15.1)
MCH RBC QN AUTO: 30.2 PG (ref 25.2–33.5)
MCHC RBC AUTO-ENTMCNC: 31 G/DL (ref 28.4–34.8)
MCV RBC AUTO: 97.3 FL (ref 82.6–102.9)
NRBC BLD-RTO: 0 PER 100 WBC
PLATELET # BLD AUTO: 243 K/UL (ref 138–453)
PMV BLD AUTO: 12.2 FL (ref 8.1–13.5)
RBC # BLD AUTO: 2.98 M/UL (ref 3.95–5.11)
WBC OTHER # BLD: 4.3 K/UL (ref 3.5–11.3)

## 2024-01-25 PROCEDURE — 85027 COMPLETE CBC AUTOMATED: CPT

## 2024-01-25 PROCEDURE — 36415 COLL VENOUS BLD VENIPUNCTURE: CPT

## 2024-01-25 PROCEDURE — P9603 ONE-WAY ALLOW PRORATED MILES: HCPCS

## 2024-01-26 ENCOUNTER — HOSPITAL ENCOUNTER (OUTPATIENT)
Age: 89
Setting detail: SPECIMEN
Discharge: HOME | End: 2024-01-26

## 2024-02-01 ENCOUNTER — HOSPITAL ENCOUNTER (OUTPATIENT)
Age: 89
Setting detail: SPECIMEN
Discharge: HOME | End: 2024-02-01
Payer: MEDICARE

## 2024-02-01 LAB
ERYTHROCYTE [DISTWIDTH] IN BLOOD BY AUTOMATED COUNT: 14.3 % (ref 11.8–14.4)
HCT VFR BLD AUTO: 26.9 % (ref 36.3–47.1)
HGB BLD-MCNC: 8.3 G/DL (ref 11.9–15.1)
MCH RBC QN AUTO: 30 PG (ref 25.2–33.5)
MCHC RBC AUTO-ENTMCNC: 30.9 G/DL (ref 28.4–34.8)
MCV RBC AUTO: 97.1 FL (ref 82.6–102.9)
NRBC BLD-RTO: 0 PER 100 WBC
PLATELET # BLD AUTO: 219 K/UL (ref 138–453)
PMV BLD AUTO: 11.8 FL (ref 8.1–13.5)
RBC # BLD AUTO: 2.77 M/UL (ref 3.95–5.11)
WBC OTHER # BLD: 3.4 K/UL (ref 3.5–11.3)

## 2024-02-01 PROCEDURE — 36415 COLL VENOUS BLD VENIPUNCTURE: CPT

## 2024-02-01 PROCEDURE — P9604 ONE-WAY ALLOW PRORATED TRIP: HCPCS

## 2024-02-01 PROCEDURE — 85027 COMPLETE CBC AUTOMATED: CPT

## 2024-02-02 ENCOUNTER — HOSPITAL ENCOUNTER (OUTPATIENT)
Age: 89
Setting detail: SPECIMEN
Discharge: HOME | End: 2024-02-02
Payer: MEDICARE

## 2024-02-02 LAB
ERYTHROCYTE [DISTWIDTH] IN BLOOD BY AUTOMATED COUNT: 14.5 % (ref 11.8–14.4)
HCT VFR BLD AUTO: 27.7 % (ref 36.3–47.1)
HGB BLD-MCNC: 8.7 G/DL (ref 11.9–15.1)
MCH RBC QN AUTO: 30.6 PG (ref 25.2–33.5)
MCHC RBC AUTO-ENTMCNC: 31.4 G/DL (ref 28.4–34.8)
MCV RBC AUTO: 97.5 FL (ref 82.6–102.9)
NRBC BLD-RTO: 0 PER 100 WBC
PLATELET # BLD AUTO: 233 K/UL (ref 138–453)
PMV BLD AUTO: 11.9 FL (ref 8.1–13.5)
RBC # BLD AUTO: 2.84 M/UL (ref 3.95–5.11)
WBC OTHER # BLD: 3.2 K/UL (ref 3.5–11.3)

## 2024-02-02 PROCEDURE — 36415 COLL VENOUS BLD VENIPUNCTURE: CPT

## 2024-02-02 PROCEDURE — 85027 COMPLETE CBC AUTOMATED: CPT

## 2024-02-02 PROCEDURE — P9603 ONE-WAY ALLOW PRORATED MILES: HCPCS

## 2024-02-05 ENCOUNTER — HOSPITAL ENCOUNTER (OUTPATIENT)
Age: 89
Setting detail: SPECIMEN
Discharge: HOME | End: 2024-02-05
Payer: MEDICARE

## 2024-02-05 LAB
ERYTHROCYTE [DISTWIDTH] IN BLOOD BY AUTOMATED COUNT: 14.2 % (ref 11.8–14.4)
HCT VFR BLD AUTO: 27.7 % (ref 36.3–47.1)
HGB BLD-MCNC: 8.6 G/DL (ref 11.9–15.1)
MCH RBC QN AUTO: 30.4 PG (ref 25.2–33.5)
MCHC RBC AUTO-ENTMCNC: 31 G/DL (ref 28.4–34.8)
MCV RBC AUTO: 97.9 FL (ref 82.6–102.9)
NRBC BLD-RTO: 0 PER 100 WBC
PLATELET # BLD AUTO: 237 K/UL (ref 138–453)
PMV BLD AUTO: 11.8 FL (ref 8.1–13.5)
RBC # BLD AUTO: 2.83 M/UL (ref 3.95–5.11)
WBC OTHER # BLD: 3.3 K/UL (ref 3.5–11.3)

## 2024-02-05 PROCEDURE — 36415 COLL VENOUS BLD VENIPUNCTURE: CPT

## 2024-02-05 PROCEDURE — P9604 ONE-WAY ALLOW PRORATED TRIP: HCPCS

## 2024-02-05 PROCEDURE — 85027 COMPLETE CBC AUTOMATED: CPT

## 2024-02-09 ENCOUNTER — HOSPITAL ENCOUNTER (OUTPATIENT)
Age: 89
Setting detail: SPECIMEN
Discharge: HOME | End: 2024-02-09
Payer: MEDICARE

## 2024-02-09 LAB
ERYTHROCYTE [DISTWIDTH] IN BLOOD BY AUTOMATED COUNT: 14.5 % (ref 11.8–14.4)
HCT VFR BLD AUTO: 28.3 % (ref 36.3–47.1)
HGB BLD-MCNC: 8.9 G/DL (ref 11.9–15.1)
MCH RBC QN AUTO: 30.8 PG (ref 25.2–33.5)
MCHC RBC AUTO-ENTMCNC: 31.4 G/DL (ref 28.4–34.8)
MCV RBC AUTO: 97.9 FL (ref 82.6–102.9)
NRBC BLD-RTO: 0 PER 100 WBC
PLATELET # BLD AUTO: 238 K/UL (ref 138–453)
PMV BLD AUTO: 12.1 FL (ref 8.1–13.5)
RBC # BLD AUTO: 2.89 M/UL (ref 3.95–5.11)
WBC OTHER # BLD: 3.6 K/UL (ref 3.5–11.3)

## 2024-02-09 PROCEDURE — 85027 COMPLETE CBC AUTOMATED: CPT

## 2024-02-09 PROCEDURE — 36415 COLL VENOUS BLD VENIPUNCTURE: CPT

## 2024-02-15 ENCOUNTER — HOSPITAL ENCOUNTER (OUTPATIENT)
Age: 89
Setting detail: SPECIMEN
Discharge: HOME | End: 2024-02-15
Payer: MEDICARE

## 2024-02-15 LAB
ALBUMIN SERPL-MCNC: 3.8 G/DL (ref 3.5–5.2)
ALBUMIN/GLOB SERPL: 1.5 {RATIO} (ref 1–2.5)
ALP SERPL-CCNC: 70 U/L (ref 35–104)
ALT SERPL-CCNC: 9 U/L (ref 5–33)
ANION GAP SERPL CALCULATED.3IONS-SCNC: 11 MMOL/L (ref 9–17)
AST SERPL-CCNC: 16 U/L
BILIRUB SERPL-MCNC: 0.2 MG/DL (ref 0.3–1.2)
BUN SERPL-MCNC: 58 MG/DL (ref 8–23)
BUN/CREAT SERPL: 45 (ref 9–20)
CALCIUM SERPL-MCNC: 8.8 MG/DL (ref 8.6–10.4)
CHLORIDE SERPL-SCNC: 106 MMOL/L (ref 98–107)
CO2 SERPL-SCNC: 18 MMOL/L (ref 20–31)
CREAT SERPL-MCNC: 1.3 MG/DL (ref 0.5–0.9)
ERYTHROCYTE [DISTWIDTH] IN BLOOD BY AUTOMATED COUNT: 14.6 % (ref 11.8–14.4)
GFR SERPL CREATININE-BSD FRML MDRD: 40 ML/MIN/1.73M2
GLUCOSE SERPL-MCNC: 75 MG/DL (ref 70–99)
HCT VFR BLD AUTO: 27.6 % (ref 36.3–47.1)
HGB BLD-MCNC: 8.9 G/DL (ref 11.9–15.1)
MCH RBC QN AUTO: 31.2 PG (ref 25.2–33.5)
MCHC RBC AUTO-ENTMCNC: 32.2 G/DL (ref 28.4–34.8)
MCV RBC AUTO: 96.8 FL (ref 82.6–102.9)
NRBC BLD-RTO: 0 PER 100 WBC
PLATELET # BLD AUTO: 227 K/UL (ref 138–453)
PMV BLD AUTO: 12 FL (ref 8.1–13.5)
POTASSIUM SERPL-SCNC: 5.2 MMOL/L (ref 3.7–5.3)
PROT SERPL-MCNC: 6.3 G/DL (ref 6.4–8.3)
RBC # BLD AUTO: 2.85 M/UL (ref 3.95–5.11)
SODIUM SERPL-SCNC: 135 MMOL/L (ref 135–144)
TSH SERPL DL<=0.05 MIU/L-ACNC: 1.82 UIU/ML (ref 0.3–5)
WBC OTHER # BLD: 3.7 K/UL (ref 3.5–11.3)

## 2024-02-15 PROCEDURE — 84443 ASSAY THYROID STIM HORMONE: CPT

## 2024-02-15 PROCEDURE — 80053 COMPREHEN METABOLIC PANEL: CPT

## 2024-02-15 PROCEDURE — 85027 COMPLETE CBC AUTOMATED: CPT

## 2024-02-15 PROCEDURE — 36415 COLL VENOUS BLD VENIPUNCTURE: CPT

## 2024-02-15 PROCEDURE — P9603 ONE-WAY ALLOW PRORATED MILES: HCPCS

## 2024-02-19 ENCOUNTER — HOSPITAL ENCOUNTER (OUTPATIENT)
Age: 89
Setting detail: SPECIMEN
Discharge: HOME | End: 2024-02-19
Payer: MEDICARE

## 2024-02-19 LAB
ERYTHROCYTE [DISTWIDTH] IN BLOOD BY AUTOMATED COUNT: 15 % (ref 11.8–14.4)
HCT VFR BLD AUTO: 30.4 % (ref 36.3–47.1)
HGB BLD-MCNC: 9.6 G/DL (ref 11.9–15.1)
MCH RBC QN AUTO: 30.9 PG (ref 25.2–33.5)
MCHC RBC AUTO-ENTMCNC: 31.6 G/DL (ref 28.4–34.8)
MCV RBC AUTO: 97.7 FL (ref 82.6–102.9)
NRBC BLD-RTO: 0 PER 100 WBC
PLATELET # BLD AUTO: 253 K/UL (ref 138–453)
PMV BLD AUTO: 11.6 FL (ref 8.1–13.5)
RBC # BLD AUTO: 3.11 M/UL (ref 3.95–5.11)
WBC OTHER # BLD: 4 K/UL (ref 3.5–11.3)

## 2024-02-19 PROCEDURE — 85027 COMPLETE CBC AUTOMATED: CPT

## 2024-02-19 PROCEDURE — P9604 ONE-WAY ALLOW PRORATED TRIP: HCPCS

## 2024-02-19 PROCEDURE — 36415 COLL VENOUS BLD VENIPUNCTURE: CPT

## 2024-02-22 ENCOUNTER — HOSPITAL ENCOUNTER (OUTPATIENT)
Age: 89
Setting detail: SPECIMEN
Discharge: HOME | End: 2024-02-22
Payer: MEDICARE

## 2024-02-22 LAB
ERYTHROCYTE [DISTWIDTH] IN BLOOD BY AUTOMATED COUNT: 15 % (ref 11.8–14.4)
HCT VFR BLD AUTO: 28.3 % (ref 36.3–47.1)
HGB BLD-MCNC: 9 G/DL (ref 11.9–15.1)
MCH RBC QN AUTO: 30.8 PG (ref 25.2–33.5)
MCHC RBC AUTO-ENTMCNC: 31.8 G/DL (ref 28.4–34.8)
MCV RBC AUTO: 96.9 FL (ref 82.6–102.9)
NRBC BLD-RTO: 0 PER 100 WBC
PLATELET # BLD AUTO: 231 K/UL (ref 138–453)
PMV BLD AUTO: 12 FL (ref 8.1–13.5)
RBC # BLD AUTO: 2.92 M/UL (ref 3.95–5.11)
WBC OTHER # BLD: 3.6 K/UL (ref 3.5–11.3)

## 2024-02-22 PROCEDURE — 36415 COLL VENOUS BLD VENIPUNCTURE: CPT

## 2024-02-22 PROCEDURE — 85027 COMPLETE CBC AUTOMATED: CPT

## 2024-02-22 PROCEDURE — P9603 ONE-WAY ALLOW PRORATED MILES: HCPCS

## 2024-02-29 ENCOUNTER — HOSPITAL ENCOUNTER (OUTPATIENT)
Age: 89
Setting detail: SPECIMEN
Discharge: HOME | End: 2024-02-29
Payer: MEDICARE

## 2024-02-29 LAB
ERYTHROCYTE [DISTWIDTH] IN BLOOD BY AUTOMATED COUNT: 14.9 % (ref 11.8–14.4)
HCT VFR BLD AUTO: 29.6 % (ref 36.3–47.1)
HGB BLD-MCNC: 9.3 G/DL (ref 11.9–15.1)
MCH RBC QN AUTO: 30.8 PG (ref 25.2–33.5)
MCHC RBC AUTO-ENTMCNC: 31.4 G/DL (ref 28.4–34.8)
MCV RBC AUTO: 98 FL (ref 82.6–102.9)
NRBC BLD-RTO: 0 PER 100 WBC
PLATELET # BLD AUTO: 213 K/UL (ref 138–453)
PMV BLD AUTO: 11.8 FL (ref 8.1–13.5)
RBC # BLD AUTO: 3.02 M/UL (ref 3.95–5.11)
WBC OTHER # BLD: 3 K/UL (ref 3.5–11.3)

## 2024-02-29 PROCEDURE — 36415 COLL VENOUS BLD VENIPUNCTURE: CPT

## 2024-02-29 PROCEDURE — 85027 COMPLETE CBC AUTOMATED: CPT

## 2024-02-29 PROCEDURE — P9604 ONE-WAY ALLOW PRORATED TRIP: HCPCS

## 2024-04-01 ENCOUNTER — HOSPITAL ENCOUNTER (OUTPATIENT)
Age: 89
Setting detail: SPECIMEN
Discharge: HOME | End: 2024-04-01

## 2024-04-04 ENCOUNTER — HOSPITAL ENCOUNTER (OUTPATIENT)
Age: 89
Setting detail: SPECIMEN
Discharge: HOME | End: 2024-04-04
Payer: MEDICARE

## 2024-04-04 LAB
ALBUMIN SERPL-MCNC: 3.8 G/DL (ref 3.5–5.2)
ALBUMIN/GLOB SERPL: 1.6 {RATIO} (ref 1–2.5)
ALP SERPL-CCNC: 62 U/L (ref 35–104)
ALT SERPL-CCNC: 9 U/L (ref 5–33)
ANION GAP SERPL CALCULATED.3IONS-SCNC: 11 MMOL/L (ref 9–17)
AST SERPL-CCNC: 15 U/L
BILIRUB SERPL-MCNC: 0.2 MG/DL (ref 0.3–1.2)
BUN SERPL-MCNC: 57 MG/DL (ref 8–23)
BUN/CREAT SERPL: 41 (ref 9–20)
CALCIUM SERPL-MCNC: 9.1 MG/DL (ref 8.6–10.4)
CHLORIDE SERPL-SCNC: 110 MMOL/L (ref 98–107)
CHOLEST SERPL-MCNC: 236 MG/DL (ref 0–199)
CHOLESTEROL/HDL RATIO: 4
CO2 SERPL-SCNC: 22 MMOL/L (ref 20–31)
CREAT SERPL-MCNC: 1.4 MG/DL (ref 0.5–0.9)
ERYTHROCYTE [DISTWIDTH] IN BLOOD BY AUTOMATED COUNT: 14.1 % (ref 11.8–14.4)
FERRITIN SERPL-MCNC: 44 NG/ML (ref 13–150)
GFR SERPL CREATININE-BSD FRML MDRD: 36 ML/MIN/1.73M2
GLUCOSE SERPL-MCNC: 75 MG/DL (ref 70–99)
HCT VFR BLD AUTO: 30.6 % (ref 36.3–47.1)
HDLC SERPL-MCNC: 57 MG/DL
HGB BLD-MCNC: 9.6 G/DL (ref 11.9–15.1)
IRON SERPL-MCNC: 50 UG/DL (ref 37–145)
LDLC SERPL CALC-MCNC: 154 MG/DL (ref 0–100)
MCH RBC QN AUTO: 31.1 PG (ref 25.2–33.5)
MCHC RBC AUTO-ENTMCNC: 31.4 G/DL (ref 28.4–34.8)
MCV RBC AUTO: 99 FL (ref 82.6–102.9)
NRBC BLD-RTO: 0 PER 100 WBC
PLATELET # BLD AUTO: 228 K/UL (ref 138–453)
PMV BLD AUTO: 11.7 FL (ref 8.1–13.5)
POTASSIUM SERPL-SCNC: 5.9 MMOL/L (ref 3.7–5.3)
PROT SERPL-MCNC: 6.2 G/DL (ref 6.4–8.3)
RBC # BLD AUTO: 3.09 M/UL (ref 3.95–5.11)
SODIUM SERPL-SCNC: 143 MMOL/L (ref 135–144)
TRIGL SERPL-MCNC: 126 MG/DL
VLDLC SERPL CALC-MCNC: 25 MG/DL
WBC OTHER # BLD: 3.8 K/UL (ref 3.5–11.3)

## 2024-04-04 PROCEDURE — 83540 ASSAY OF IRON: CPT

## 2024-04-04 PROCEDURE — 80061 LIPID PANEL: CPT

## 2024-04-04 PROCEDURE — P9604 ONE-WAY ALLOW PRORATED TRIP: HCPCS

## 2024-04-04 PROCEDURE — 82728 ASSAY OF FERRITIN: CPT

## 2024-04-04 PROCEDURE — 36415 COLL VENOUS BLD VENIPUNCTURE: CPT

## 2024-04-04 PROCEDURE — 80053 COMPREHEN METABOLIC PANEL: CPT

## 2024-04-04 PROCEDURE — 85027 COMPLETE CBC AUTOMATED: CPT

## 2024-04-15 ENCOUNTER — HOSPITAL ENCOUNTER (OUTPATIENT)
Age: 89
Setting detail: SPECIMEN
Discharge: HOME | End: 2024-04-15

## 2024-05-20 ENCOUNTER — HOSPITAL ENCOUNTER (OUTPATIENT)
Age: 89
Setting detail: SPECIMEN
Discharge: HOME | End: 2024-05-20
Payer: MEDICARE

## 2024-05-20 LAB — POTASSIUM SERPL-SCNC: 6.2 MMOL/L (ref 3.7–5.3)

## 2024-05-20 PROCEDURE — 84132 ASSAY OF SERUM POTASSIUM: CPT

## 2024-05-20 PROCEDURE — P9604 ONE-WAY ALLOW PRORATED TRIP: HCPCS

## 2024-05-20 PROCEDURE — 36415 COLL VENOUS BLD VENIPUNCTURE: CPT

## 2024-05-23 ENCOUNTER — HOSPITAL ENCOUNTER (OUTPATIENT)
Age: 89
Setting detail: SPECIMEN
Discharge: HOME | End: 2024-05-23
Payer: MEDICARE

## 2024-05-23 LAB — POTASSIUM SERPL-SCNC: 5.6 MMOL/L (ref 3.7–5.3)

## 2024-05-23 PROCEDURE — P9604 ONE-WAY ALLOW PRORATED TRIP: HCPCS

## 2024-05-23 PROCEDURE — 36415 COLL VENOUS BLD VENIPUNCTURE: CPT

## 2024-05-23 PROCEDURE — 84132 ASSAY OF SERUM POTASSIUM: CPT

## 2024-07-15 ENCOUNTER — HOSPITAL ENCOUNTER (EMERGENCY)
Age: 89
Discharge: HOME | End: 2024-07-16
Attending: EMERGENCY MEDICINE
Payer: MEDICARE

## 2024-07-15 DIAGNOSIS — E87.5 HYPERKALEMIA: Primary | ICD-10-CM

## 2024-07-15 LAB
BASOPHILS # BLD: <0.03 K/UL (ref 0–0.2)
BASOPHILS NFR BLD: 0 % (ref 0–2)
EOSINOPHIL # BLD: 0.21 K/UL (ref 0–0.44)
EOSINOPHILS RELATIVE PERCENT: 4 % (ref 1–4)
ERYTHROCYTE [DISTWIDTH] IN BLOOD BY AUTOMATED COUNT: 12.8 % (ref 11.8–14.4)
HCT VFR BLD AUTO: 33.5 % (ref 36.3–47.1)
HGB BLD-MCNC: 11 G/DL (ref 11.9–15.1)
IMM GRANULOCYTES # BLD AUTO: <0.03 K/UL (ref 0–0.3)
IMM GRANULOCYTES NFR BLD: 0 %
LYMPHOCYTES NFR BLD: 0.79 K/UL (ref 1.1–3.7)
LYMPHOCYTES RELATIVE PERCENT: 14 % (ref 24–43)
MCH RBC QN AUTO: 32.4 PG (ref 25.2–33.5)
MCHC RBC AUTO-ENTMCNC: 32.8 G/DL (ref 28.4–34.8)
MCV RBC AUTO: 98.8 FL (ref 82.6–102.9)
MONOCYTES NFR BLD: 0.34 K/UL (ref 0.1–1.2)
MONOCYTES NFR BLD: 6 % (ref 3–12)
NEUTROPHILS NFR BLD: 76 % (ref 36–65)
NEUTS SEG NFR BLD: 4.28 K/UL (ref 1.5–8.1)
NRBC BLD-RTO: 0 PER 100 WBC
PLATELET # BLD AUTO: 256 K/UL (ref 138–453)
PMV BLD AUTO: 10.9 FL (ref 8.1–13.5)
RBC # BLD AUTO: 3.39 M/UL (ref 3.95–5.11)
WBC OTHER # BLD: 5.7 K/UL (ref 3.5–11.3)

## 2024-07-15 PROCEDURE — 80048 BASIC METABOLIC PNL TOTAL CA: CPT

## 2024-07-15 PROCEDURE — 96375 TX/PRO/DX INJ NEW DRUG ADDON: CPT

## 2024-07-15 PROCEDURE — 93005 ELECTROCARDIOGRAM TRACING: CPT | Performed by: EMERGENCY MEDICINE

## 2024-07-15 PROCEDURE — 96374 THER/PROPH/DIAG INJ IV PUSH: CPT

## 2024-07-15 PROCEDURE — 99284 EMERGENCY DEPT VISIT MOD MDM: CPT

## 2024-07-15 PROCEDURE — 85025 COMPLETE CBC W/AUTO DIFF WBC: CPT

## 2024-07-15 RX ORDER — 0.9 % SODIUM CHLORIDE 0.9 %
500 INTRAVENOUS SOLUTION INTRAVENOUS ONCE
Status: COMPLETED | OUTPATIENT
Start: 2024-07-16 | End: 2024-07-16

## 2024-07-15 ASSESSMENT — LIFESTYLE VARIABLES
HOW MANY STANDARD DRINKS CONTAINING ALCOHOL DO YOU HAVE ON A TYPICAL DAY: PATIENT DOES NOT DRINK
HOW OFTEN DO YOU HAVE A DRINK CONTAINING ALCOHOL: NEVER

## 2024-07-16 VITALS
DIASTOLIC BLOOD PRESSURE: 36 MMHG | TEMPERATURE: 98 F | HEART RATE: 67 BPM | RESPIRATION RATE: 17 BRPM | OXYGEN SATURATION: 96 % | SYSTOLIC BLOOD PRESSURE: 147 MMHG

## 2024-07-16 LAB
ANION GAP SERPL CALCULATED.3IONS-SCNC: 12 MMOL/L (ref 9–17)
BUN SERPL-MCNC: 57 MG/DL (ref 8–23)
BUN/CREAT SERPL: 41 (ref 9–20)
CALCIUM SERPL-MCNC: 9.1 MG/DL (ref 8.6–10.4)
CHLORIDE SERPL-SCNC: 103 MMOL/L (ref 98–107)
CO2 SERPL-SCNC: 21 MMOL/L (ref 20–31)
CREAT SERPL-MCNC: 1.4 MG/DL (ref 0.5–0.9)
EKG ATRIAL RATE: 92 BPM
EKG P-R INTERVAL: 214 MS
EKG Q-T INTERVAL: 346 MS
EKG QRS DURATION: 72 MS
EKG QTC CALCULATION (BAZETT): 427 MS
EKG R AXIS: -16 DEGREES
EKG T AXIS: 28 DEGREES
EKG VENTRICULAR RATE: 92 BPM
GFR, ESTIMATED: 36 ML/MIN/1.73M2
GLUCOSE SERPL-MCNC: 136 MG/DL (ref 70–99)
POTASSIUM SERPL-SCNC: 5.2 MMOL/L (ref 3.7–5.3)
POTASSIUM SERPL-SCNC: 6.3 MMOL/L (ref 3.7–5.3)
SODIUM SERPL-SCNC: 136 MMOL/L (ref 135–144)

## 2024-07-16 PROCEDURE — 84132 ASSAY OF SERUM POTASSIUM: CPT

## 2024-07-16 PROCEDURE — 6370000000 HC RX 637 (ALT 250 FOR IP): Performed by: EMERGENCY MEDICINE

## 2024-07-16 PROCEDURE — 93010 ELECTROCARDIOGRAM REPORT: CPT | Performed by: INTERNAL MEDICINE

## 2024-07-16 PROCEDURE — 2580000003 HC RX 258: Performed by: EMERGENCY MEDICINE

## 2024-07-16 PROCEDURE — 2500000003 HC RX 250 WO HCPCS: Performed by: EMERGENCY MEDICINE

## 2024-07-16 PROCEDURE — 6360000002 HC RX W HCPCS: Performed by: EMERGENCY MEDICINE

## 2024-07-16 RX ORDER — CITALOPRAM 20 MG/1
20 TABLET ORAL DAILY
COMMUNITY

## 2024-07-16 RX ORDER — FERROUS SULFATE 325(65) MG
325 TABLET ORAL
COMMUNITY

## 2024-07-16 RX ORDER — FUROSEMIDE 10 MG/ML
40 INJECTION INTRAMUSCULAR; INTRAVENOUS ONCE
Status: COMPLETED | OUTPATIENT
Start: 2024-07-16 | End: 2024-07-16

## 2024-07-16 RX ADMIN — SODIUM ZIRCONIUM CYCLOSILICATE 10 G: 10 POWDER, FOR SUSPENSION ORAL at 00:51

## 2024-07-16 RX ADMIN — FUROSEMIDE 40 MG: 10 INJECTION, SOLUTION INTRAMUSCULAR; INTRAVENOUS at 00:51

## 2024-07-16 RX ADMIN — SODIUM BICARBONATE 50 MEQ: 84 INJECTION, SOLUTION INTRAVENOUS at 01:06

## 2024-07-16 RX ADMIN — SODIUM CHLORIDE 500 ML: 9 INJECTION, SOLUTION INTRAVENOUS at 00:04

## 2024-07-16 NOTE — ED PROVIDER NOTES
HPI:  7/15/24,   Time: 11:55 PM EDT         Vikas Dominguez is a 88 y.o. female presenting to the ED for here for high potassium of 6.3, beginning 1 day ago.  The complaint has been constant, mild in severity, and worsened by nothing.  Patient voices no complaints no chest pain or palpitations or difficulty breathing    ROS:   Pertinent positives and negatives are stated within HPI, all other systems reviewed and are negative.  --------------------------------------------- PAST HISTORY ---------------------------------------------  Past Medical History:  has a past medical history of Anemia, Anorexia, Anxiety disorder, Blind, Cardiomegaly, Chronic kidney disease, Dementia (HCC), Depression, Hypertension, and OCD (obsessive compulsive disorder).    Past Surgical History:  has a past surgical history that includes Cataract removal.    Social History:  reports that she has never smoked. She has never used smokeless tobacco. She reports that she does not currently use drugs. She reports that she does not drink alcohol.    Family History: family history is not on file.     The patient’s home medications have been reviewed.    Allergies: Patient has no known allergies.    -------------------------------------------------- RESULTS -------------------------------------------------  All laboratory and radiology results have been personally reviewed by myself   LABS:  Results for orders placed or performed during the hospital encounter of 07/15/24   Basic Metabolic Panel   Result Value Ref Range    Sodium 136 135 - 144 mmol/L    Potassium 6.3 (HH) 3.7 - 5.3 mmol/L    Chloride 103 98 - 107 mmol/L    CO2 21 20 - 31 mmol/L    Anion Gap 12 9 - 17 mmol/L    Glucose 136 (H) 70 - 99 mg/dL    BUN 57 (H) 8 - 23 mg/dL    Creatinine 1.4 (H) 0.5 - 0.9 mg/dL    Est, Glom Filt Rate 36 (L) >60 mL/min/1.73m2    BUN/Creatinine Ratio 41 (H) 9 - 20    Calcium 9.1 8.6 - 10.4 mg/dL   CBC with Auto Differential   Result Value Ref Range    WBC 5.7

## 2024-07-16 NOTE — ED NOTES
Multiple attempts to call report to [facility] with report and transfer update unsuccessful

## 2024-11-27 ENCOUNTER — HOSPITAL ENCOUNTER (EMERGENCY)
Age: 88
Discharge: HOME | End: 2024-11-27
Attending: STUDENT IN AN ORGANIZED HEALTH CARE EDUCATION/TRAINING PROGRAM
Payer: MEDICARE

## 2024-11-27 ENCOUNTER — APPOINTMENT (OUTPATIENT)
Dept: CT IMAGING | Age: 88
End: 2024-11-27
Payer: MEDICARE

## 2024-11-27 VITALS
RESPIRATION RATE: 16 BRPM | DIASTOLIC BLOOD PRESSURE: 66 MMHG | HEART RATE: 93 BPM | OXYGEN SATURATION: 94 % | TEMPERATURE: 98.9 F | SYSTOLIC BLOOD PRESSURE: 185 MMHG
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
## 2024-11-27 DIAGNOSIS — S00.01XA ABRASION OF SCALP, INITIAL ENCOUNTER: ICD-10-CM

## 2024-11-27 PROCEDURE — 6370000000 HC RX 637 (ALT 250 FOR IP): Performed by: STUDENT IN AN ORGANIZED HEALTH CARE EDUCATION/TRAINING PROGRAM

## 2024-11-27 PROCEDURE — 70450 CT HEAD/BRAIN W/O DYE: CPT

## 2024-11-27 PROCEDURE — 99284 EMERGENCY DEPT VISIT MOD MDM: CPT

## 2024-11-27 RX ORDER — BACITRACIN ZINC 500 [USP'U]/G
OINTMENT TOPICAL ONCE
Status: COMPLETED | OUTPATIENT
Start: 2024-11-27 | End: 2024-11-27

## 2024-11-27 RX ORDER — ONDANSETRON 4 MG/1
4 TABLET, ORALLY DISINTEGRATING ORAL ONCE
Status: COMPLETED | OUTPATIENT
Start: 2024-11-27 | End: 2024-11-27

## 2024-11-27 RX ADMIN — BACITRACIN ZINC: 500 OINTMENT TOPICAL at 21:20

## 2024-11-27 RX ADMIN — ONDANSETRON 4 MG: 4 TABLET, ORALLY DISINTEGRATING ORAL at 21:20

## 2024-11-27 ASSESSMENT — ENCOUNTER SYMPTOMS
BACK PAIN: 0
ABDOMINAL PAIN: 0
NAUSEA: 0
VOMITING: 0
COUGH: 0
SHORTNESS OF BREATH: 0

## 2024-11-27 ASSESSMENT — PAIN - FUNCTIONAL ASSESSMENT: PAIN_FUNCTIONAL_ASSESSMENT: NONE - DENIES PAIN

## 2024-11-28 NOTE — ED PROVIDER NOTES
Frequency of Social Gatherings with Friends and Family: Never     Attends [employer]ars[employer] Services: More than 4 times per year     Active Member of Clubs or Organizations: No     Attends Club or Organization Meetings: Never     Marital Status: Never    Intimate Partner Violence: Not At Risk (2/14/2024)    Humiliation, Afraid, Rape, and Kick questionnaire     Fear of Current or Ex-Partner: No     Emotionally Abused: No     Physically Abused: No     Sexually Abused: No   Housing Stability: Unknown (2/14/2024)    Housing Stability Vital Sign     Unable to Pay for Housing in the Last Year: No     Unstable Housing in the Last Year: No       SCREENINGS        Drift Coma Scale  Eye Opening: Spontaneous  Best Verbal Response: Confused  Best Motor Response: Obeys commands  Drift Coma Scale Score: 14               PHYSICAL EXAM    (up to 7 for level 4, 8 or more for level 5)     ED Triage Vitals   BP Systolic BP Percentile Diastolic BP Percentile Temp Temp Source Pulse Respirations SpO2   11/27/24 2033 -- -- 11/27/24 2032 11/27/24 2032 11/27/24 2032 11/27/24 2032 11/27/24 2032   (!) 209/72   98.9 °F (37.2 °C) Oral 93 16 96 %      Height Weight         -- --                       Physical Exam  Vitals and nursing note reviewed.   Constitutional:       General: She is not in acute distress.     Appearance: Normal appearance. She is not ill-appearing, toxic-appearing or diaphoretic.   Cardiovascular:      Rate and Rhythm: Normal rate and regular rhythm.   Pulmonary:      Effort: Pulmonary effort is normal.      Breath sounds: Normal breath sounds.   Abdominal:      General: There is no distension.      Palpations: Abdomen is soft.      Tenderness: There is no abdominal tenderness. There is no guarding.   Musculoskeletal:      Comments: No midline spinal tenderness.  No areas of bony tenderness.   Skin:     General: Skin is warm and dry.   Neurological:      General: No focal deficit present.      Mental Status: She is

## 2024-11-28 NOTE — ED NOTES
Report attempted to be called to [facility] at this time, no answer. Message left for them to call back with questions

## 2024-11-29 ENCOUNTER — CARE COORDINATION (OUTPATIENT)
Dept: CARE COORDINATION | Age: 88
End: 2024-11-29

## 2024-11-29 NOTE — CARE COORDINATION
Ambulatory Care Coordination Note     11/29/2024 8:37 AM     This patient was received as a referral from Population health report       Per chart review and recent ED visit on 11/27; patient resides at [facility] in memory care unit.         Care Coordination Plan of Care:   This nurse Care Coordinator will  - nicky permanent exclusion for care coordination due to residing at SNF

## 2024-12-02 ENCOUNTER — HOSPITAL ENCOUNTER (OUTPATIENT)
Age: 88
Setting detail: SPECIMEN
Discharge: HOME | End: 2024-12-02
Payer: MEDICARE

## 2024-12-02 LAB
ALBUMIN SERPL-MCNC: 3.9 G/DL (ref 3.5–5.2)
ALBUMIN/GLOB SERPL: 1.6 {RATIO} (ref 1–2.5)
ALP SERPL-CCNC: 73 U/L (ref 35–104)
ALT SERPL-CCNC: 16 U/L (ref 10–35)
ANION GAP SERPL CALCULATED.3IONS-SCNC: 12 MMOL/L (ref 9–16)
AST SERPL-CCNC: 28 U/L (ref 10–35)
BILIRUB SERPL-MCNC: <0.2 MG/DL (ref 0–1.2)
BUN SERPL-MCNC: 73 MG/DL (ref 8–23)
BUN/CREAT SERPL: 46 (ref 9–20)
CALCIUM SERPL-MCNC: 8.9 MG/DL (ref 8.6–10.4)
CHLORIDE SERPL-SCNC: 102 MMOL/L (ref 98–107)
CO2 SERPL-SCNC: 25 MMOL/L (ref 20–31)
CREAT SERPL-MCNC: 1.6 MG/DL (ref 0.5–0.9)
GFR, ESTIMATED: 30 ML/MIN/1.73M2
GLUCOSE SERPL-MCNC: 82 MG/DL (ref 74–99)
POTASSIUM SERPL-SCNC: 4.6 MMOL/L (ref 3.7–5.3)
PROT SERPL-MCNC: 6.3 G/DL (ref 6.6–8.7)
SODIUM SERPL-SCNC: 139 MMOL/L (ref 136–145)

## 2024-12-02 PROCEDURE — 80053 COMPREHEN METABOLIC PANEL: CPT

## 2024-12-03 ENCOUNTER — HOSPITAL ENCOUNTER (OUTPATIENT)
Age: 88
Setting detail: SPECIMEN
Discharge: HOME | End: 2024-12-03

## 2024-12-19 ENCOUNTER — HOSPITAL ENCOUNTER (OUTPATIENT)
Age: 88
Setting detail: SPECIMEN
Discharge: HOME | End: 2024-12-19
Payer: MEDICARE

## 2024-12-19 LAB
ALBUMIN SERPL-MCNC: 3.9 G/DL (ref 3.5–5.2)
ALBUMIN/GLOB SERPL: 1.7 {RATIO} (ref 1–2.5)
ALP SERPL-CCNC: 82 U/L (ref 35–104)
ALT SERPL-CCNC: 13 U/L (ref 10–35)
ANION GAP SERPL CALCULATED.3IONS-SCNC: 13 MMOL/L (ref 9–16)
AST SERPL-CCNC: 22 U/L (ref 10–35)
BILIRUB SERPL-MCNC: <0.2 MG/DL (ref 0–1.2)
BUN SERPL-MCNC: 52 MG/DL (ref 8–23)
BUN/CREAT SERPL: 27 (ref 9–20)
CALCIUM SERPL-MCNC: 8.8 MG/DL (ref 8.6–10.4)
CHLORIDE SERPL-SCNC: 109 MMOL/L (ref 98–107)
CO2 SERPL-SCNC: 17 MMOL/L (ref 20–31)
CREAT SERPL-MCNC: 1.9 MG/DL (ref 0.5–0.9)
GFR, ESTIMATED: 24 ML/MIN/1.73M2
GLUCOSE SERPL-MCNC: 134 MG/DL (ref 74–99)
POTASSIUM SERPL-SCNC: 4.1 MMOL/L (ref 3.7–5.3)
PROT SERPL-MCNC: 6.2 G/DL (ref 6.6–8.7)
SODIUM SERPL-SCNC: 139 MMOL/L (ref 136–145)

## 2024-12-19 PROCEDURE — 80053 COMPREHEN METABOLIC PANEL: CPT
